# Patient Record
Sex: FEMALE | Race: WHITE | NOT HISPANIC OR LATINO | Employment: OTHER | URBAN - METROPOLITAN AREA
[De-identification: names, ages, dates, MRNs, and addresses within clinical notes are randomized per-mention and may not be internally consistent; named-entity substitution may affect disease eponyms.]

---

## 2019-09-06 RX ORDER — TEMAZEPAM 15 MG/1
15 CAPSULE ORAL NIGHTLY PRN
Qty: 30 CAPSULE | Refills: 0 | Status: SHIPPED | OUTPATIENT
Start: 2019-09-06 | End: 2019-10-06

## 2019-09-06 NOTE — TELEPHONE ENCOUNTER
----- Message from Peter Treviño sent at 9/6/2019 10:37 AM CDT -----  Contact: self  Type:  RX Refill Request    Who Called: Pt  Refill or New Rx:refill  RX Name and Strength:Temazapam 15 mg   How is the patient currently taking it? (ex. 1XDay): Once daily  Is this a 30 day or 90 day RX:90 day  Preferred Pharmacy with phone number: [facility] in [place] [phone]  Local or Mail Order:local  Ordering Provider: Dr Stephenson  Would the patient rather a call back or a response via [facility]? callback  Best Call Back Number: [phone]  Additional Information: Pt is a pt of Dr Stephenson. Pt needs refill on this medication

## 2020-01-09 ENCOUNTER — OFFICE VISIT (OUTPATIENT)
Dept: FAMILY MEDICINE | Facility: CLINIC | Age: 65
End: 2020-01-09
Payer: COMMERCIAL

## 2020-01-09 VITALS
SYSTOLIC BLOOD PRESSURE: 128 MMHG | OXYGEN SATURATION: 96 % | DIASTOLIC BLOOD PRESSURE: 88 MMHG | TEMPERATURE: 98 F | HEART RATE: 99 BPM | HEIGHT: 62 IN | BODY MASS INDEX: 21.83 KG/M2 | WEIGHT: 118.63 LBS

## 2020-01-09 DIAGNOSIS — F41.9 HYPOSOMNIA, INSOMNIA OR SLEEPLESSNESS ASSOCIATED WITH ANXIETY: ICD-10-CM

## 2020-01-09 DIAGNOSIS — F51.05 HYPOSOMNIA, INSOMNIA OR SLEEPLESSNESS ASSOCIATED WITH ANXIETY: ICD-10-CM

## 2020-01-09 DIAGNOSIS — Z87.19 HISTORY OF PANCREATITIS: ICD-10-CM

## 2020-01-09 DIAGNOSIS — E03.9 HYPOTHYROIDISM, UNSPECIFIED TYPE: ICD-10-CM

## 2020-01-09 DIAGNOSIS — Z12.11 SCREENING FOR COLON CANCER: ICD-10-CM

## 2020-01-09 DIAGNOSIS — Z00.00 WELLNESS EXAMINATION: ICD-10-CM

## 2020-01-09 DIAGNOSIS — K57.90 DIVERTICULAR DISEASE: ICD-10-CM

## 2020-01-09 DIAGNOSIS — I10 HYPERTENSION, UNSPECIFIED TYPE: ICD-10-CM

## 2020-01-09 LAB
ALBUMIN SERPL BCP-MCNC: 4.6 G/DL (ref 3.5–5.2)
ALP SERPL-CCNC: 89 U/L (ref 55–135)
ALT SERPL W/O P-5'-P-CCNC: 25 U/L (ref 10–44)
ANION GAP SERPL CALC-SCNC: 12 MMOL/L (ref 8–16)
AST SERPL-CCNC: 24 U/L (ref 10–40)
BASOPHILS # BLD AUTO: 0.04 K/UL (ref 0–0.2)
BASOPHILS NFR BLD: 0.4 % (ref 0–1.9)
BILIRUB SERPL-MCNC: 1.2 MG/DL (ref 0.1–1)
BUN SERPL-MCNC: 13 MG/DL (ref 8–23)
CALCIUM SERPL-MCNC: 10.7 MG/DL (ref 8.7–10.5)
CHLORIDE SERPL-SCNC: 103 MMOL/L (ref 95–110)
CHOLEST SERPL-MCNC: 266 MG/DL (ref 120–199)
CHOLEST/HDLC SERPL: 3.5 {RATIO} (ref 2–5)
CO2 SERPL-SCNC: 23 MMOL/L (ref 23–29)
CREAT SERPL-MCNC: 0.7 MG/DL (ref 0.5–1.4)
DIFFERENTIAL METHOD: ABNORMAL
EOSINOPHIL # BLD AUTO: 0.1 K/UL (ref 0–0.5)
EOSINOPHIL NFR BLD: 0.8 % (ref 0–8)
ERYTHROCYTE [DISTWIDTH] IN BLOOD BY AUTOMATED COUNT: 13.6 % (ref 11.5–14.5)
EST. GFR  (AFRICAN AMERICAN): >60 ML/MIN/1.73 M^2
EST. GFR  (NON AFRICAN AMERICAN): >60 ML/MIN/1.73 M^2
GLUCOSE SERPL-MCNC: 94 MG/DL (ref 70–110)
HCT VFR BLD AUTO: 44.7 % (ref 37–48.5)
HDLC SERPL-MCNC: 77 MG/DL (ref 40–75)
HDLC SERPL: 28.9 % (ref 20–50)
HGB BLD-MCNC: 14.5 G/DL (ref 12–16)
IMM GRANULOCYTES # BLD AUTO: 0.03 K/UL (ref 0–0.04)
IMM GRANULOCYTES NFR BLD AUTO: 0.3 % (ref 0–0.5)
LDLC SERPL CALC-MCNC: 172.6 MG/DL (ref 63–159)
LYMPHOCYTES # BLD AUTO: 0.8 K/UL (ref 1–4.8)
LYMPHOCYTES NFR BLD: 8.4 % (ref 18–48)
MCH RBC QN AUTO: 32.8 PG (ref 27–31)
MCHC RBC AUTO-ENTMCNC: 32.4 G/DL (ref 32–36)
MCV RBC AUTO: 101 FL (ref 82–98)
MONOCYTES # BLD AUTO: 0.5 K/UL (ref 0.3–1)
MONOCYTES NFR BLD: 5.7 % (ref 4–15)
NEUTROPHILS # BLD AUTO: 7.5 K/UL (ref 1.8–7.7)
NEUTROPHILS NFR BLD: 84.4 % (ref 38–73)
NONHDLC SERPL-MCNC: 189 MG/DL
NRBC BLD-RTO: 0 /100 WBC
PLATELET # BLD AUTO: 258 K/UL (ref 150–350)
PMV BLD AUTO: 11.3 FL (ref 9.2–12.9)
POTASSIUM SERPL-SCNC: 4.2 MMOL/L (ref 3.5–5.1)
PROT SERPL-MCNC: 8 G/DL (ref 6–8.4)
RBC # BLD AUTO: 4.42 M/UL (ref 4–5.4)
SODIUM SERPL-SCNC: 138 MMOL/L (ref 136–145)
T4 FREE SERPL-MCNC: 1.3 NG/DL (ref 0.71–1.51)
TRIGL SERPL-MCNC: 82 MG/DL (ref 30–150)
TSH SERPL DL<=0.005 MIU/L-ACNC: 0.07 UIU/ML (ref 0.4–4)
WBC # BLD AUTO: 8.89 K/UL (ref 3.9–12.7)

## 2020-01-09 PROCEDURE — 85025 COMPLETE CBC W/AUTO DIFF WBC: CPT

## 2020-01-09 PROCEDURE — 3074F PR MOST RECENT SYSTOLIC BLOOD PRESSURE < 130 MM HG: ICD-10-PCS | Mod: CPTII,S$GLB,, | Performed by: INTERNAL MEDICINE

## 2020-01-09 PROCEDURE — 99396 PREV VISIT EST AGE 40-64: CPT | Mod: S$GLB,,, | Performed by: INTERNAL MEDICINE

## 2020-01-09 PROCEDURE — 99396 PR PREVENTIVE VISIT,EST,40-64: ICD-10-PCS | Mod: S$GLB,,, | Performed by: INTERNAL MEDICINE

## 2020-01-09 PROCEDURE — 3079F PR MOST RECENT DIASTOLIC BLOOD PRESSURE 80-89 MM HG: ICD-10-PCS | Mod: CPTII,S$GLB,, | Performed by: INTERNAL MEDICINE

## 2020-01-09 PROCEDURE — 3074F SYST BP LT 130 MM HG: CPT | Mod: CPTII,S$GLB,, | Performed by: INTERNAL MEDICINE

## 2020-01-09 PROCEDURE — 84443 ASSAY THYROID STIM HORMONE: CPT

## 2020-01-09 PROCEDURE — 80053 COMPREHEN METABOLIC PANEL: CPT

## 2020-01-09 PROCEDURE — 3079F DIAST BP 80-89 MM HG: CPT | Mod: CPTII,S$GLB,, | Performed by: INTERNAL MEDICINE

## 2020-01-09 PROCEDURE — 99999 PR PBB SHADOW E&M-EST. PATIENT-LVL III: ICD-10-PCS | Mod: PBBFAC,,, | Performed by: INTERNAL MEDICINE

## 2020-01-09 PROCEDURE — 36415 COLL VENOUS BLD VENIPUNCTURE: CPT

## 2020-01-09 PROCEDURE — 99999 PR PBB SHADOW E&M-EST. PATIENT-LVL III: CPT | Mod: PBBFAC,,, | Performed by: INTERNAL MEDICINE

## 2020-01-09 PROCEDURE — 3008F BODY MASS INDEX DOCD: CPT | Mod: CPTII,S$GLB,, | Performed by: INTERNAL MEDICINE

## 2020-01-09 PROCEDURE — 80061 LIPID PANEL: CPT

## 2020-01-09 PROCEDURE — 84439 ASSAY OF FREE THYROXINE: CPT

## 2020-01-09 PROCEDURE — 3008F PR BODY MASS INDEX (BMI) DOCUMENTED: ICD-10-PCS | Mod: CPTII,S$GLB,, | Performed by: INTERNAL MEDICINE

## 2020-01-09 RX ORDER — TEMAZEPAM 15 MG/1
15-30 CAPSULE ORAL NIGHTLY PRN
Qty: 60 CAPSULE | Refills: 3 | Status: SHIPPED | OUTPATIENT
Start: 2020-01-09 | End: 2020-06-01 | Stop reason: SDUPTHER

## 2020-01-09 RX ORDER — ACYCLOVIR 400 MG/1
400 TABLET ORAL DAILY PRN
COMMUNITY
Start: 2019-10-08

## 2020-01-09 RX ORDER — LEVOTHYROXINE SODIUM 88 UG/1
88 TABLET ORAL DAILY
Qty: 30 TABLET | Refills: 6 | Status: SHIPPED | OUTPATIENT
Start: 2020-01-09 | End: 2020-01-13

## 2020-01-09 RX ORDER — TEMAZEPAM 15 MG/1
15-30 CAPSULE ORAL NIGHTLY PRN
COMMUNITY
End: 2020-01-09 | Stop reason: SDUPTHER

## 2020-01-09 RX ORDER — PROMETHAZINE HYDROCHLORIDE 25 MG/1
1 TABLET ORAL EVERY 6 HOURS PRN
COMMUNITY
Start: 2019-10-31 | End: 2020-01-09 | Stop reason: SDUPTHER

## 2020-01-09 RX ORDER — FLUOCINOLONE ACETONIDE 0.11 MG/ML
1 OIL TOPICAL NIGHTLY
COMMUNITY
Start: 2019-12-31 | End: 2022-03-10

## 2020-01-09 RX ORDER — LEVOTHYROXINE SODIUM 88 UG/1
88 TABLET ORAL DAILY
COMMUNITY
Start: 2019-12-13 | End: 2020-01-09 | Stop reason: SDUPTHER

## 2020-01-09 RX ORDER — LISINOPRIL AND HYDROCHLOROTHIAZIDE 10; 12.5 MG/1; MG/1
1 TABLET ORAL DAILY
COMMUNITY
End: 2020-01-09 | Stop reason: ALTCHOICE

## 2020-01-09 RX ORDER — PROMETHAZINE HYDROCHLORIDE 25 MG/1
25 TABLET ORAL EVERY 6 HOURS PRN
Qty: 30 TABLET | Refills: 3 | Status: SHIPPED | OUTPATIENT
Start: 2020-01-09 | End: 2021-02-23

## 2020-01-09 NOTE — PROGRESS NOTES
[facility] Note    Chief Complaint      Chief Complaint   Patient presents with    Annual Exam     pt needs to re-est from      History of Present Illness      Essence Tapia is a 64 y.o. female who presents today for annual wellness exam.  Patient  Is previously est with me at [facility]      Active Problem List with Overview Notes    Diagnosis Date Noted    Diverticular disease 2020     Sees jabier, had complicated admission in 2018 for diverticulitis with pseudomonal bacteremia     Has some continued episodes of n/v but she attributes to lactose intolerance       History of pancreatitis 2020     Was taking prn tramadol which caused itching so she has self dc'd   Has cut down on drinking       Hypertension 2020     Was started on lisinoprol hctz but bp is normalized and has had 10# intentional weigth loss, also cut back drinking, taking meds less tahn once weekly,       Hyposomnia, insomnia or sleeplessness associated with anxiety 2020     Using prn temazepam, sometimes 15 and sometimes 30, taking drug holidays       Wellness examination 2020    Hypothyroid 2020     Feels euthyroid, endocrine recenly lincreased synthroid 75 to 88       Screening for colon cancer 2020     Last scope dr osorio kay              Health Maintenance   Topic Date Due    Hepatitis C Screening  1955    Lipid Panel  1955    TETANUS VACCINE  07/10/1975    Pap Smear with HPV Cotest  07/10/1978    Mammogram  07/10/1997    Colonoscopy  12/15/2020       Pap: unsure   MM dr zhou nl   DEXA:dr zhou   Colonoscopy: due for cscope this yr dr high   Td: unsure  Flu: refuses   Shingles: interested, will send to pharmacy downstairs   Tobacco:   Other MDs: [facility]   Fam Hx breast, colon, lung:     Past Medical History:   Diagnosis Date    Diverticulitis     Edema     Spinal stenosis        Past Surgical History:   Procedure  Laterality Date    APPENDECTOMY       SECTION, CLASSIC      NASAL RECONSTRUCTION      SIGMOIDECTOMY         family history includes Breast cancer in her sister; No Known Problems in her father and mother.    Social History     Tobacco Use    Smoking status: Never Smoker    Smokeless tobacco: Never Used   Substance Use Topics    Alcohol use: Yes    Drug use: No       Review of Systems   Constitutional: Negative for chills and fever.   HENT: Negative for congestion and sore throat.    Eyes: Negative for blurred vision and discharge.   Respiratory: Negative for cough and shortness of breath.    Cardiovascular: Negative for chest pain and palpitations.   Gastrointestinal: Negative for constipation, diarrhea, nausea and vomiting.   Genitourinary: Negative for dysuria and hematuria.   Musculoskeletal: Negative for falls and myalgias.   Skin: Negative for itching and rash.   Neurological: Negative for dizziness and headaches.        Outpatient Encounter Medications as of 2020   Medication Sig Dispense Refill    acyclovir (ZOVIRAX) 400 MG tablet Take 400 mg by mouth once daily.      DERMA-SMOOTHE/FS SCALP OIL 0.01 % Oil Apply 1 application topically every evening.      levothyroxine (SYNTHROID) 88 MCG tablet Take 1 tablet (88 mcg total) by mouth once daily. 30 tablet 6    promethazine (PHENERGAN) 25 MG tablet Take 1 tablet (25 mg total) by mouth every 6 (six) hours as needed. 30 tablet 3    temazepam (RESTORIL) 15 mg Cap Take 1-2 capsules (15-30 mg total) by mouth nightly as needed. 60 capsule 3    triamterene-hydrochlorothiazide 37.5-25 mg (DYAZIDE) 37.5-25 mg per capsule Take 1 capsule by mouth every morning.      [DISCONTINUED] levothyroxine (SYNTHROID) 88 MCG tablet Take 88 mcg by mouth once daily.      [DISCONTINUED] lisinopril-hydrochlorothiazide (PRINZIDE,ZESTORETIC) 10-12.5 mg per tablet Take 1 tablet by mouth once daily.      [DISCONTINUED] promethazine (PHENERGAN) 25 MG tablet Take 1  "tablet by mouth every 6 (six) hours as needed.      [DISCONTINUED] temazepam (RESTORIL) 15 mg Cap Take 15-30 mg by mouth nightly as needed.      [DISCONTINUED] norethindrone-ethinyl estradiol-iron (MICROGESTIN FE1.5/30) 1.5 mg-30 mcg (21)/75 mg (7) tablet Take 1 tablet by mouth once daily.      [DISCONTINUED] ondansetron (ZOFRAN) 4 MG tablet Take 1 tablet (4 mg total) by mouth every 8 (eight) hours as needed for Nausea. 12 tablet 0     No facility-administered encounter medications on file as of 1/9/2020.         Review of patient's allergies indicates:   Allergen Reactions    Codeine     Dilaudid [hydromorphone] Other (See Comments)     hallucinations    Hydrocodone Hives, Nausea And Vomiting and Other (See Comments)     hallucinations    Latex Itching    Tramadol     Zofran [ondansetron hcl (pf)]            Physical Exam      Vital Signs  Temp: 98.2 °F (36.8 °C)  Temp src: Oral  Pulse: 99  SpO2: 96 %  BP: 128/88  BP Location: Right arm  Patient Position: Sitting  Pain Score: 0-No pain  Height and Weight  Height: 5' 2" (157.5 cm)  Weight: 53.8 kg (118 lb 9.7 oz)  BSA (Calculated - sq m): 1.53 sq meters  BMI (Calculated): 21.7  Weight in (lb) to have BMI = 25: 136.4]    Physical Exam   Constitutional: She is oriented to person, place, and time. She appears well-developed and well-nourished.   HENT:   Head: Normocephalic and atraumatic.   Right Ear: External ear normal.   Left Ear: External ear normal.   Eyes: Right eye exhibits no discharge. Left eye exhibits no discharge.   Neck: Normal range of motion. No thyromegaly present.   Cardiovascular: Normal rate, regular rhythm, normal heart sounds and intact distal pulses.   No murmur heard.  Pulmonary/Chest: Effort normal and breath sounds normal. No respiratory distress.   Abdominal: Soft. Bowel sounds are normal. She exhibits no distension. There is no tenderness.   Musculoskeletal: Normal range of motion. She exhibits no deformity.   Neurological: She is " alert and oriented to person, place, and time.   Skin: Skin is warm and dry. No rash noted.   Psychiatric: She has a normal mood and affect. Her behavior is normal.        Laboratory:  CBC:  No results for input(s): WBC, RBC, HGB, HCT, PLT, MCV, MCH, MCHC in the last 2160 hours.  CMP:  No results for input(s): GLU, CALCIUM, ALBUMIN, PROT, NA, K, CO2, CL, BUN, ALKPHOS, ALT, AST, BILITOT in the last 2160 hours.    Invalid input(s): CREATININ  URINALYSIS:  No results for input(s): COLORU, CLARITYU, SPECGRAV, PHUR, PROTEINUA, GLUCOSEU, BILIRUBINCON, BLOODU, WBCU, RBCU, BACTERIA, MUCUS, NITRITE, LEUKOCYTESUR, UROBILINOGEN, HYALINECASTS in the last 2160 hours.   LIPIDS:  No results for input(s): TSH, HDL, CHOL, TRIG, LDLCALC, CHOLHDL, NONHDLCHOL, TOTALCHOLEST in the last 2160 hours.  TSH:  No results for input(s): TSH in the last 2160 hours.  A1C:  No results for input(s): HGBA1C in the last 2160 hours.        Assessment/Plan     Essence Tapia is a 64 y.o.female with:    Hypertension  Stop bp meds and will call with log     Hypothyroid  Ck tfts     Screening for colon cancer  Due for repeat ref placed     Hyposomnia, insomnia or sleeplessness associated with anxiety  Refill ordered     Diverticular disease  Will let mne know if having symptms     History of pancreatitis  Continued avodiance of provoking measures       Orders Placed This Encounter   Procedures    CBC auto differential    Lipid panel    Comprehensive metabolic panel    TSH    T4, free    Ambulatory referral to Colorectal Surgery     Referral Priority:   Routine     Referral Type:   Consultation     Referral Reason:   Specialty Services Required     Referred to Provider:   Calvin Simeon MD     Requested Specialty:   Colon and Rectal Surgery     Number of Visits Requested:   1       -Continue current medications and maintain follow up with specialists.  Return to clinic in6 months.    -Will obtain [facility] records     Noreen Stephenson  MD  1/9/2020 11:04 AM    [facility]

## 2020-01-13 ENCOUNTER — TELEPHONE (OUTPATIENT)
Dept: FAMILY MEDICINE | Facility: CLINIC | Age: 65
End: 2020-01-13

## 2020-01-13 DIAGNOSIS — E03.9 HYPOTHYROIDISM, UNSPECIFIED TYPE: ICD-10-CM

## 2020-01-13 DIAGNOSIS — E83.52 HYPERCALCEMIA: Primary | ICD-10-CM

## 2020-01-13 RX ORDER — LEVOTHYROXINE SODIUM 88 UG/1
75 TABLET ORAL DAILY
Qty: 30 TABLET | Refills: 6 | Status: SHIPPED | OUTPATIENT
Start: 2020-01-13 | End: 2020-01-13

## 2020-01-13 RX ORDER — LEVOTHYROXINE SODIUM 75 UG/1
75 TABLET ORAL DAILY
Qty: 90 TABLET | Refills: 3 | Status: SHIPPED | OUTPATIENT
Start: 2020-01-13 | End: 2020-12-18

## 2020-01-13 NOTE — TELEPHONE ENCOUNTER
Called pt to discuss labs  subclin hhyperthyroid, will reduce to 75 mcg and patricio tfts 8 weeks    CMP looks hemoconcnetrated will patricio with TFts, pt was fasting all am at time of labs     Discuss flp at follow up

## 2020-01-13 NOTE — TELEPHONE ENCOUNTER
----- Message from Eva Vaughan sent at 1/13/2020  3:07 PM CST -----  Contact: Self [phone]  Patient Returning Your Phone Call

## 2020-04-06 RX ORDER — LISINOPRIL AND HYDROCHLOROTHIAZIDE 10; 12.5 MG/1; MG/1
1 TABLET ORAL DAILY
Qty: 90 TABLET | Refills: 3 | Status: SHIPPED | OUTPATIENT
Start: 2020-04-06 | End: 2021-02-23 | Stop reason: SDUPTHER

## 2020-04-06 NOTE — TELEPHONE ENCOUNTER
----- Message from Kendal Noble sent at 4/6/2020  8:25 AM CDT -----  Contact: self call [phone]  Requesting an RX refill or new RX.  Is this a refill or new RX:  Refill  RX name and strength: Lisinopril - HCTZ 10/12.5 mg tab  Directions (copy/paste from chart): Take one tab by mouth daily   Is this a 30 day or 90 day RX:  90  Local pharmacy or mail order pharmacy:  Local   Pharmacy name and phone # (copy/paste from chart):  [facility]  [phone]   Comments:

## 2020-04-06 NOTE — TELEPHONE ENCOUNTER
I call patient to ask which medications she is on patient said she taking Lisinopril/HCTZ she no longer on Dyazide.

## 2020-04-13 PROBLEM — Z00.00 WELLNESS EXAMINATION: Status: RESOLVED | Noted: 2020-01-09 | Resolved: 2020-04-13

## 2020-05-04 ENCOUNTER — TELEPHONE (OUTPATIENT)
Dept: FAMILY MEDICINE | Facility: CLINIC | Age: 65
End: 2020-05-04

## 2020-05-04 ENCOUNTER — OFFICE VISIT (OUTPATIENT)
Dept: FAMILY MEDICINE | Facility: CLINIC | Age: 65
End: 2020-05-04
Payer: COMMERCIAL

## 2020-05-04 VITALS
WEIGHT: 121.5 LBS | TEMPERATURE: 99 F | BODY MASS INDEX: 22.36 KG/M2 | HEIGHT: 62 IN | SYSTOLIC BLOOD PRESSURE: 110 MMHG | HEART RATE: 100 BPM | RESPIRATION RATE: 16 BRPM | DIASTOLIC BLOOD PRESSURE: 78 MMHG | OXYGEN SATURATION: 95 %

## 2020-05-04 DIAGNOSIS — R11.0 NAUSEA: ICD-10-CM

## 2020-05-04 DIAGNOSIS — R10.9 ABDOMINAL PAIN, UNSPECIFIED ABDOMINAL LOCATION: ICD-10-CM

## 2020-05-04 DIAGNOSIS — R07.1 CHEST PAIN ON BREATHING: Primary | ICD-10-CM

## 2020-05-04 PROCEDURE — 99999 PR PBB SHADOW E&M-EST. PATIENT-LVL III: CPT | Mod: PBBFAC,,, | Performed by: INTERNAL MEDICINE

## 2020-05-04 PROCEDURE — 3078F PR MOST RECENT DIASTOLIC BLOOD PRESSURE < 80 MM HG: ICD-10-PCS | Mod: CPTII,S$GLB,, | Performed by: INTERNAL MEDICINE

## 2020-05-04 PROCEDURE — 3078F DIAST BP <80 MM HG: CPT | Mod: CPTII,S$GLB,, | Performed by: INTERNAL MEDICINE

## 2020-05-04 PROCEDURE — 93005 ELECTROCARDIOGRAM TRACING: CPT | Mod: S$GLB,,, | Performed by: INTERNAL MEDICINE

## 2020-05-04 PROCEDURE — 93005 EKG 12-LEAD: ICD-10-PCS | Mod: S$GLB,,, | Performed by: INTERNAL MEDICINE

## 2020-05-04 PROCEDURE — 99214 OFFICE O/P EST MOD 30 MIN: CPT | Mod: S$GLB,,, | Performed by: INTERNAL MEDICINE

## 2020-05-04 PROCEDURE — 3074F SYST BP LT 130 MM HG: CPT | Mod: CPTII,S$GLB,, | Performed by: INTERNAL MEDICINE

## 2020-05-04 PROCEDURE — 99999 PR PBB SHADOW E&M-EST. PATIENT-LVL III: ICD-10-PCS | Mod: PBBFAC,,, | Performed by: INTERNAL MEDICINE

## 2020-05-04 PROCEDURE — 99214 PR OFFICE/OUTPT VISIT, EST, LEVL IV, 30-39 MIN: ICD-10-PCS | Mod: S$GLB,,, | Performed by: INTERNAL MEDICINE

## 2020-05-04 PROCEDURE — 3074F PR MOST RECENT SYSTOLIC BLOOD PRESSURE < 130 MM HG: ICD-10-PCS | Mod: CPTII,S$GLB,, | Performed by: INTERNAL MEDICINE

## 2020-05-04 PROCEDURE — 93010 EKG 12-LEAD: ICD-10-PCS | Mod: S$GLB,,, | Performed by: INTERNAL MEDICINE

## 2020-05-04 PROCEDURE — 3008F PR BODY MASS INDEX (BMI) DOCUMENTED: ICD-10-PCS | Mod: CPTII,S$GLB,, | Performed by: INTERNAL MEDICINE

## 2020-05-04 PROCEDURE — 93010 ELECTROCARDIOGRAM REPORT: CPT | Mod: S$GLB,,, | Performed by: INTERNAL MEDICINE

## 2020-05-04 PROCEDURE — 3008F BODY MASS INDEX DOCD: CPT | Mod: CPTII,S$GLB,, | Performed by: INTERNAL MEDICINE

## 2020-05-04 NOTE — TELEPHONE ENCOUNTER
----- Message from Batool Albright sent at 5/4/2020  8:34 AM CDT -----  Contact: [phone]/ self   Patient requesting to speak with you regarding chest pain. Please advise.

## 2020-05-04 NOTE — TELEPHONE ENCOUNTER
Talked with patient regarding complaint   Sudden onset Sharp pain in back between shoulders about 24 hours ago, is constant, radiating down r arm,feels sob/pain worse on deep inspiration, NSAIDs and heat not helping.  Endorses some sadness and anxiety     Bp is normal       Will put her on for 230

## 2020-05-04 NOTE — ASSESSMENT & PLAN NOTE
In office EKG reassurinf but CP with nausea in a female, pt looks clinically poor, history of complicated GI illness  Needs IVF, real time labs and imaging, troponin   To ED   Pt verbalized understanding to [facility] ed per pt preference

## 2020-05-05 NOTE — PROGRESS NOTES
[facility] Note    Chief Complaint      Chief Complaint   Patient presents with    Chest Pain     History of Present Illness      Essencetesha Tapia is a 64 y.o. female who presents today for chief complaint chest pain x1day .  PCP: Noreen Stephenson MD      Active Problem List with Overview Notes    Diagnosis Date Noted    Chest pain on breathing 05/04/2020     x24 hours   Sudden onset, constant, sharp, worse on deep inspiration, initially in back between shoulder blades  And radiating down r arm, she thought to be msk so tried nsaids and heat but no relief   Now also substernal, non reproducible, associated with mild nause and abdominal bloating          HPI     Health Maintenance   Topic Date Due    Hepatitis C Screening  1955    TETANUS VACCINE  07/10/1975    Pap Smear with HPV Cotest  07/10/1978    Mammogram  07/10/1997    Colonoscopy  12/15/2020    Lipid Panel  2025       Past Medical History:   Diagnosis Date    Diverticulitis     Edema     Spinal stenosis        Past Surgical History:   Procedure Laterality Date    APPENDECTOMY       SECTION, CLASSIC      NASAL RECONSTRUCTION      SIGMOIDECTOMY         family history includes Breast cancer in her sister; No Known Problems in her father and mother.    Social History     Tobacco Use    Smoking status: Never Smoker    Smokeless tobacco: Never Used   Substance Use Topics    Alcohol use: Yes    Drug use: No       Review of Systems   Constitutional: Positive for malaise/fatigue. Negative for chills, diaphoresis and fever.   Respiratory: Negative for cough, hemoptysis, sputum production and wheezing.    Cardiovascular: Positive for chest pain and palpitations. Negative for leg swelling and PND.   Gastrointestinal: Positive for diarrhea and nausea. Negative for abdominal pain, heartburn and vomiting.        Outpatient Encounter Medications as of 2020   Medication Sig Dispense Refill     "levothyroxine (SYNTHROID) 75 MCG tablet Take 1 tablet (75 mcg total) by mouth once daily. 90 tablet 3    lisinopriL-hydrochlorothiazide (PRINZIDE,ZESTORETIC) 10-12.5 mg per tablet Take 1 tablet by mouth once daily. 90 tablet 3    temazepam (RESTORIL) 15 mg Cap Take 1-2 capsules (15-30 mg total) by mouth nightly as needed. 60 capsule 3    acyclovir (ZOVIRAX) 400 MG tablet Take 400 mg by mouth once daily.      DERMA-SMOOTHE/FS SCALP OIL 0.01 % Oil Apply 1 application topically every evening.      promethazine (PHENERGAN) 25 MG tablet Take 1 tablet (25 mg total) by mouth every 6 (six) hours as needed. (Patient not taking: Reported on 5/4/2020) 30 tablet 3     No facility-administered encounter medications on file as of 5/4/2020.         Review of patient's allergies indicates:   Allergen Reactions    Codeine     Dilaudid [hydromorphone] Other (See Comments)     hallucinations    Hydrocodone Hives, Nausea And Vomiting and Other (See Comments)     hallucinations    Latex Itching    Tramadol     Zofran [ondansetron hcl (pf)]            Physical Exam      Vital Signs  Temp: 98.7 °F (37.1 °C)  Temp src: Oral  Pulse: 100  Resp: 16  SpO2: 95 %  BP: 110/78  BP Location: Right arm  Patient Position: Sitting  Height and Weight  Height: 5' 2" (157.5 cm)  Weight: 55.1 kg (121 lb 7.6 oz)  BSA (Calculated - sq m): 1.55 sq meters  BMI (Calculated): 22.2  Weight in (lb) to have BMI = 25: 136.4]    Physical Exam   Constitutional: She is oriented to person, place, and time. She appears well-developed and well-nourished. She appears ill.   HENT:   Head: Normocephalic and atraumatic.   Right Ear: External ear normal.   Left Ear: External ear normal.   Eyes: Right eye exhibits no discharge. Left eye exhibits no discharge.   Neck: Normal range of motion. JVD: mild tpp epigastricv. No thyromegaly present.   Cardiovascular: Normal rate, regular rhythm, normal heart sounds and intact distal pulses.   No murmur " heard.  Pulmonary/Chest: Effort normal and breath sounds normal. No respiratory distress.   Abdominal: Soft. Bowel sounds are normal. She exhibits no distension. There is tenderness.   Musculoskeletal: Normal range of motion. She exhibits no deformity.   Neurological: She is alert and oriented to person, place, and time.   Skin: Skin is warm and dry. No rash noted.   Psychiatric: She has a normal mood and affect. Her behavior is normal.   Vitals reviewed.       Laboratory:  CBC:  No results for input(s): WBC, RBC, HGB, HCT, PLT, MCV, MCH, MCHC in the last 2160 hours.  CMP:  No results for input(s): GLU, CALCIUM, ALBUMIN, PROT, NA, K, CO2, CL, BUN, ALKPHOS, ALT, AST, BILITOT in the last 2160 hours.    Invalid input(s): CREATININ  URINALYSIS:  No results for input(s): COLORU, CLARITYU, SPECGRAV, PHUR, PROTEINUA, GLUCOSEU, BILIRUBINCON, BLOODU, WBCU, RBCU, BACTERIA, MUCUS, NITRITE, LEUKOCYTESUR, UROBILINOGEN, HYALINECASTS in the last 2160 hours.   LIPIDS:  No results for input(s): TSH, HDL, CHOL, TRIG, LDLCALC, CHOLHDL, NONHDLCHOL, TOTALCHOLEST in the last 2160 hours.  TSH:  No results for input(s): TSH in the last 2160 hours.  A1C:  No results for input(s): HGBA1C in the last 2160 hours.        Assessment/Plan     Essence Tapia is a 64 y.o.female with:    Chest pain on breathing  In office EKG reassurinf but CP with nausea in a female, pt looks clinically poor, history of complicated GI illness  Needs IVF, real time labs and imaging, troponin   To ED   Pt verbalized understanding to [facility] ed per pt preference         Orders Placed This Encounter   Procedures    CBC auto differential     Standing Status:   Future     Standing Expiration Date:   7/3/2021    Comprehensive metabolic panel     Standing Status:   Future     Standing Expiration Date:   7/3/2021    Amylase     Standing Status:   Future     Standing Expiration Date:   7/3/2021    Lipase     Standing Status:   Future     Standing Expiration Date:    7/3/2021    IN OFFICE EKG 12-LEAD (to Muse)     Order Specific Question:   Diagnosis     Answer:   Chest pain [832773]       -Continue current medications and maintain follow up with specialists.  No future appointments.    Noreen Stephenson MD  5/5/2020 9:07 AM    [facility] - [facility]

## 2020-05-08 DIAGNOSIS — Z12.39 BREAST CANCER SCREENING: ICD-10-CM

## 2020-05-12 ENCOUNTER — PATIENT OUTREACH (OUTPATIENT)
Dept: ADMINISTRATIVE | Facility: HOSPITAL | Age: 65
End: 2020-05-12

## 2020-06-01 DIAGNOSIS — F51.05 HYPOSOMNIA, INSOMNIA OR SLEEPLESSNESS ASSOCIATED WITH ANXIETY: ICD-10-CM

## 2020-06-01 DIAGNOSIS — F41.9 HYPOSOMNIA, INSOMNIA OR SLEEPLESSNESS ASSOCIATED WITH ANXIETY: ICD-10-CM

## 2020-06-01 RX ORDER — TEMAZEPAM 15 MG/1
15-30 CAPSULE ORAL NIGHTLY PRN
Qty: 60 CAPSULE | Refills: 3 | Status: SHIPPED | OUTPATIENT
Start: 2020-06-01 | End: 2020-10-01

## 2020-06-01 NOTE — TELEPHONE ENCOUNTER
----- Message from Netta Dale sent at 6/1/2020  9:36 AM CDT -----  Contact: self/ [phone]  Requesting an RX refill or new RX.  Is this a refill or new RX:    RX name and strength: temazepam (RESTORIL) 15 mg Cap 60 capsule   Directions (copy/paste from chart):  Take 1-2 capsules (15-30 mg total) by mouth nightly as needed  Is this a 30 day or 90 day RX:  90  Local pharmacy or mail order pharmacy:  [facility]/pharmacy #[id number] - [place] - [address] [phone] (Phone)  [phone] (Fax)  Pharmacy name and phone # (copy/paste from chart):     Comments:

## 2020-07-27 NOTE — TELEPHONE ENCOUNTER
Patient states around 5am this morning she started having pain started in shoulder and now in her chest. She states it is hard to breath. Patient called the [facility] nurse and talked to her. Patient does not want to go to the ER because of  Covid-19.    No

## 2020-10-05 ENCOUNTER — PATIENT MESSAGE (OUTPATIENT)
Dept: INTERNAL MEDICINE | Facility: CLINIC | Age: 65
End: 2020-10-05

## 2021-01-04 ENCOUNTER — PATIENT MESSAGE (OUTPATIENT)
Dept: ADMINISTRATIVE | Facility: HOSPITAL | Age: 66
End: 2021-01-04

## 2021-02-08 ENCOUNTER — TELEPHONE (OUTPATIENT)
Dept: FAMILY MEDICINE | Facility: CLINIC | Age: 66
End: 2021-02-08

## 2021-02-09 ENCOUNTER — PATIENT OUTREACH (OUTPATIENT)
Dept: ADMINISTRATIVE | Facility: HOSPITAL | Age: 66
End: 2021-02-09

## 2021-02-23 ENCOUNTER — TELEPHONE (OUTPATIENT)
Dept: ADMINISTRATIVE | Facility: HOSPITAL | Age: 66
End: 2021-02-23

## 2021-02-23 ENCOUNTER — OFFICE VISIT (OUTPATIENT)
Dept: FAMILY MEDICINE | Facility: CLINIC | Age: 66
End: 2021-02-23
Payer: COMMERCIAL

## 2021-02-23 VITALS
SYSTOLIC BLOOD PRESSURE: 116 MMHG | BODY MASS INDEX: 21.97 KG/M2 | DIASTOLIC BLOOD PRESSURE: 82 MMHG | HEART RATE: 74 BPM | TEMPERATURE: 98 F | HEIGHT: 62 IN | WEIGHT: 119.38 LBS | OXYGEN SATURATION: 99 %

## 2021-02-23 DIAGNOSIS — Z11.4 ENCOUNTER FOR SCREENING FOR HIV: ICD-10-CM

## 2021-02-23 DIAGNOSIS — F41.9 HYPOSOMNIA, INSOMNIA OR SLEEPLESSNESS ASSOCIATED WITH ANXIETY: ICD-10-CM

## 2021-02-23 DIAGNOSIS — I10 HYPERTENSION, UNSPECIFIED TYPE: ICD-10-CM

## 2021-02-23 DIAGNOSIS — Z12.31 ENCOUNTER FOR SCREENING MAMMOGRAM FOR MALIGNANT NEOPLASM OF BREAST: ICD-10-CM

## 2021-02-23 DIAGNOSIS — E03.9 HYPOTHYROIDISM, UNSPECIFIED TYPE: ICD-10-CM

## 2021-02-23 DIAGNOSIS — Z00.00 WELLNESS EXAMINATION: Primary | ICD-10-CM

## 2021-02-23 DIAGNOSIS — K57.90 DIVERTICULAR DISEASE: ICD-10-CM

## 2021-02-23 DIAGNOSIS — F51.05 HYPOSOMNIA, INSOMNIA OR SLEEPLESSNESS ASSOCIATED WITH ANXIETY: ICD-10-CM

## 2021-02-23 DIAGNOSIS — Z11.59 ENCOUNTER FOR HEPATITIS C SCREENING TEST FOR LOW RISK PATIENT: ICD-10-CM

## 2021-02-23 PROCEDURE — 3008F BODY MASS INDEX DOCD: CPT | Mod: CPTII,S$GLB,, | Performed by: INTERNAL MEDICINE

## 2021-02-23 PROCEDURE — 3079F PR MOST RECENT DIASTOLIC BLOOD PRESSURE 80-89 MM HG: ICD-10-PCS | Mod: CPTII,S$GLB,, | Performed by: INTERNAL MEDICINE

## 2021-02-23 PROCEDURE — 99396 PREV VISIT EST AGE 40-64: CPT | Mod: S$GLB,,, | Performed by: INTERNAL MEDICINE

## 2021-02-23 PROCEDURE — 3074F SYST BP LT 130 MM HG: CPT | Mod: CPTII,S$GLB,, | Performed by: INTERNAL MEDICINE

## 2021-02-23 PROCEDURE — 1126F AMNT PAIN NOTED NONE PRSNT: CPT | Mod: S$GLB,,, | Performed by: INTERNAL MEDICINE

## 2021-02-23 PROCEDURE — 99999 PR PBB SHADOW E&M-EST. PATIENT-LVL IV: ICD-10-PCS | Mod: PBBFAC,,, | Performed by: INTERNAL MEDICINE

## 2021-02-23 PROCEDURE — 3079F DIAST BP 80-89 MM HG: CPT | Mod: CPTII,S$GLB,, | Performed by: INTERNAL MEDICINE

## 2021-02-23 PROCEDURE — 99396 PR PREVENTIVE VISIT,EST,40-64: ICD-10-PCS | Mod: S$GLB,,, | Performed by: INTERNAL MEDICINE

## 2021-02-23 PROCEDURE — 3074F PR MOST RECENT SYSTOLIC BLOOD PRESSURE < 130 MM HG: ICD-10-PCS | Mod: CPTII,S$GLB,, | Performed by: INTERNAL MEDICINE

## 2021-02-23 PROCEDURE — 3008F PR BODY MASS INDEX (BMI) DOCUMENTED: ICD-10-PCS | Mod: CPTII,S$GLB,, | Performed by: INTERNAL MEDICINE

## 2021-02-23 PROCEDURE — 1126F PR PAIN SEVERITY QUANTIFIED, NO PAIN PRESENT: ICD-10-PCS | Mod: S$GLB,,, | Performed by: INTERNAL MEDICINE

## 2021-02-23 PROCEDURE — 99999 PR PBB SHADOW E&M-EST. PATIENT-LVL IV: CPT | Mod: PBBFAC,,, | Performed by: INTERNAL MEDICINE

## 2021-02-23 RX ORDER — LEVOTHYROXINE SODIUM 75 UG/1
75 TABLET ORAL DAILY
Qty: 90 TABLET | Refills: 3 | Status: SHIPPED | OUTPATIENT
Start: 2021-02-23 | End: 2022-01-11

## 2021-02-23 RX ORDER — LISINOPRIL AND HYDROCHLOROTHIAZIDE 10; 12.5 MG/1; MG/1
1 TABLET ORAL DAILY
Qty: 90 TABLET | Refills: 3 | Status: SHIPPED | OUTPATIENT
Start: 2021-02-23 | End: 2022-03-17

## 2021-02-23 RX ORDER — TEMAZEPAM 15 MG/1
15 CAPSULE ORAL NIGHTLY
Qty: 90 CAPSULE | Refills: 3 | Status: SHIPPED | OUTPATIENT
Start: 2021-02-23 | End: 2021-03-11

## 2021-02-25 ENCOUNTER — PATIENT MESSAGE (OUTPATIENT)
Dept: ADMINISTRATIVE | Facility: HOSPITAL | Age: 66
End: 2021-02-25

## 2021-02-25 ENCOUNTER — PATIENT OUTREACH (OUTPATIENT)
Dept: ADMINISTRATIVE | Facility: HOSPITAL | Age: 66
End: 2021-02-25

## 2021-03-04 ENCOUNTER — PATIENT MESSAGE (OUTPATIENT)
Dept: FAMILY MEDICINE | Facility: CLINIC | Age: 66
End: 2021-03-04

## 2021-03-04 ENCOUNTER — TELEPHONE (OUTPATIENT)
Dept: FAMILY MEDICINE | Facility: CLINIC | Age: 66
End: 2021-03-04

## 2021-03-08 PROBLEM — R07.1 CHEST PAIN ON BREATHING: Status: RESOLVED | Noted: 2020-05-04 | Resolved: 2021-03-08

## 2021-03-10 ENCOUNTER — TELEPHONE (OUTPATIENT)
Dept: FAMILY MEDICINE | Facility: CLINIC | Age: 66
End: 2021-03-10

## 2021-03-10 DIAGNOSIS — E03.9 HYPOTHYROIDISM, UNSPECIFIED TYPE: Primary | ICD-10-CM

## 2021-03-10 DIAGNOSIS — E78.5 DYSLIPIDEMIA: ICD-10-CM

## 2021-03-10 DIAGNOSIS — F41.9 HYPOSOMNIA, INSOMNIA OR SLEEPLESSNESS ASSOCIATED WITH ANXIETY: ICD-10-CM

## 2021-03-10 DIAGNOSIS — F51.05 HYPOSOMNIA, INSOMNIA OR SLEEPLESSNESS ASSOCIATED WITH ANXIETY: ICD-10-CM

## 2021-03-11 RX ORDER — TEMAZEPAM 15 MG/1
CAPSULE ORAL
Qty: 60 CAPSULE | Refills: 0 | Status: SHIPPED | OUTPATIENT
Start: 2021-03-11 | End: 2021-04-13

## 2021-04-05 ENCOUNTER — PATIENT MESSAGE (OUTPATIENT)
Dept: ADMINISTRATIVE | Facility: HOSPITAL | Age: 66
End: 2021-04-05

## 2021-04-13 DIAGNOSIS — F51.05 HYPOSOMNIA, INSOMNIA OR SLEEPLESSNESS ASSOCIATED WITH ANXIETY: ICD-10-CM

## 2021-04-13 DIAGNOSIS — F41.9 HYPOSOMNIA, INSOMNIA OR SLEEPLESSNESS ASSOCIATED WITH ANXIETY: ICD-10-CM

## 2021-04-14 RX ORDER — TEMAZEPAM 15 MG/1
CAPSULE ORAL
Qty: 60 CAPSULE | Refills: 0 | OUTPATIENT
Start: 2021-04-14

## 2021-05-13 ENCOUNTER — PATIENT OUTREACH (OUTPATIENT)
Dept: ADMINISTRATIVE | Facility: HOSPITAL | Age: 66
End: 2021-05-13

## 2021-05-13 ENCOUNTER — TELEPHONE (OUTPATIENT)
Dept: ADMINISTRATIVE | Facility: HOSPITAL | Age: 66
End: 2021-05-13

## 2021-05-13 DIAGNOSIS — Z12.11 SCREEN FOR COLON CANCER: Primary | ICD-10-CM

## 2021-06-07 PROBLEM — Z00.00 WELLNESS EXAMINATION: Status: RESOLVED | Noted: 2020-01-09 | Resolved: 2021-06-07

## 2021-07-07 ENCOUNTER — PATIENT MESSAGE (OUTPATIENT)
Dept: ADMINISTRATIVE | Facility: HOSPITAL | Age: 66
End: 2021-07-07

## 2021-07-09 ENCOUNTER — TELEPHONE (OUTPATIENT)
Dept: ADMINISTRATIVE | Facility: HOSPITAL | Age: 66
End: 2021-07-09

## 2021-07-14 ENCOUNTER — TELEPHONE (OUTPATIENT)
Dept: FAMILY MEDICINE | Facility: CLINIC | Age: 66
End: 2021-07-14

## 2021-07-14 DIAGNOSIS — K57.92 DIVERTICULITIS: Primary | ICD-10-CM

## 2021-07-14 RX ORDER — MOXIFLOXACIN HYDROCHLORIDE 400 MG/1
400 TABLET ORAL DAILY
Qty: 10 TABLET | Refills: 0 | Status: SHIPPED | OUTPATIENT
Start: 2021-07-14 | End: 2023-06-13

## 2021-07-20 ENCOUNTER — PATIENT OUTREACH (OUTPATIENT)
Dept: ADMINISTRATIVE | Facility: HOSPITAL | Age: 66
End: 2021-07-20

## 2021-07-20 ENCOUNTER — TELEPHONE (OUTPATIENT)
Dept: ADMINISTRATIVE | Facility: HOSPITAL | Age: 66
End: 2021-07-20

## 2021-07-29 ENCOUNTER — PATIENT OUTREACH (OUTPATIENT)
Dept: ADMINISTRATIVE | Facility: HOSPITAL | Age: 66
End: 2021-07-29

## 2021-07-29 ENCOUNTER — TELEPHONE (OUTPATIENT)
Dept: ADMINISTRATIVE | Facility: HOSPITAL | Age: 66
End: 2021-07-29

## 2021-08-06 ENCOUNTER — PATIENT OUTREACH (OUTPATIENT)
Dept: ADMINISTRATIVE | Facility: HOSPITAL | Age: 66
End: 2021-08-06

## 2021-08-06 ENCOUNTER — TELEPHONE (OUTPATIENT)
Dept: ADMINISTRATIVE | Facility: HOSPITAL | Age: 66
End: 2021-08-06

## 2021-08-12 ENCOUNTER — OFFICE VISIT (OUTPATIENT)
Dept: FAMILY MEDICINE | Facility: CLINIC | Age: 66
End: 2021-08-12
Payer: COMMERCIAL

## 2021-08-12 VITALS
OXYGEN SATURATION: 98 % | DIASTOLIC BLOOD PRESSURE: 74 MMHG | BODY MASS INDEX: 21.75 KG/M2 | SYSTOLIC BLOOD PRESSURE: 110 MMHG | TEMPERATURE: 98 F | HEART RATE: 76 BPM | WEIGHT: 118.19 LBS | HEIGHT: 62 IN

## 2021-08-12 DIAGNOSIS — K57.90 DIVERTICULAR DISEASE: ICD-10-CM

## 2021-08-12 DIAGNOSIS — Z00.00 WELLNESS EXAMINATION: ICD-10-CM

## 2021-08-12 DIAGNOSIS — E03.9 HYPOTHYROIDISM, UNSPECIFIED TYPE: Primary | ICD-10-CM

## 2021-08-12 DIAGNOSIS — Z87.19 HISTORY OF PANCREATITIS: ICD-10-CM

## 2021-08-12 DIAGNOSIS — Z12.11 SCREENING FOR COLORECTAL CANCER: ICD-10-CM

## 2021-08-12 DIAGNOSIS — E78.5 DYSLIPIDEMIA: ICD-10-CM

## 2021-08-12 DIAGNOSIS — Z12.12 SCREENING FOR COLORECTAL CANCER: ICD-10-CM

## 2021-08-12 DIAGNOSIS — I10 HYPERTENSION, UNSPECIFIED TYPE: ICD-10-CM

## 2021-08-12 PROCEDURE — 1126F AMNT PAIN NOTED NONE PRSNT: CPT | Mod: CPTII,S$GLB,, | Performed by: INTERNAL MEDICINE

## 2021-08-12 PROCEDURE — 3008F PR BODY MASS INDEX (BMI) DOCUMENTED: ICD-10-PCS | Mod: CPTII,S$GLB,, | Performed by: INTERNAL MEDICINE

## 2021-08-12 PROCEDURE — 99213 OFFICE O/P EST LOW 20 MIN: CPT | Mod: S$GLB,,, | Performed by: INTERNAL MEDICINE

## 2021-08-12 PROCEDURE — 3074F SYST BP LT 130 MM HG: CPT | Mod: CPTII,S$GLB,, | Performed by: INTERNAL MEDICINE

## 2021-08-12 PROCEDURE — 1159F MED LIST DOCD IN RCRD: CPT | Mod: CPTII,S$GLB,, | Performed by: INTERNAL MEDICINE

## 2021-08-12 PROCEDURE — 99213 PR OFFICE/OUTPT VISIT, EST, LEVL III, 20-29 MIN: ICD-10-PCS | Mod: S$GLB,,, | Performed by: INTERNAL MEDICINE

## 2021-08-12 PROCEDURE — 3078F PR MOST RECENT DIASTOLIC BLOOD PRESSURE < 80 MM HG: ICD-10-PCS | Mod: CPTII,S$GLB,, | Performed by: INTERNAL MEDICINE

## 2021-08-12 PROCEDURE — 99999 PR PBB SHADOW E&M-EST. PATIENT-LVL III: CPT | Mod: PBBFAC,,, | Performed by: INTERNAL MEDICINE

## 2021-08-12 PROCEDURE — 3074F PR MOST RECENT SYSTOLIC BLOOD PRESSURE < 130 MM HG: ICD-10-PCS | Mod: CPTII,S$GLB,, | Performed by: INTERNAL MEDICINE

## 2021-08-12 PROCEDURE — 3008F BODY MASS INDEX DOCD: CPT | Mod: CPTII,S$GLB,, | Performed by: INTERNAL MEDICINE

## 2021-08-12 PROCEDURE — 3078F DIAST BP <80 MM HG: CPT | Mod: CPTII,S$GLB,, | Performed by: INTERNAL MEDICINE

## 2021-08-12 PROCEDURE — 99999 PR PBB SHADOW E&M-EST. PATIENT-LVL III: ICD-10-PCS | Mod: PBBFAC,,, | Performed by: INTERNAL MEDICINE

## 2021-08-12 PROCEDURE — 1159F PR MEDICATION LIST DOCUMENTED IN MEDICAL RECORD: ICD-10-PCS | Mod: CPTII,S$GLB,, | Performed by: INTERNAL MEDICINE

## 2021-08-12 PROCEDURE — 1126F PR PAIN SEVERITY QUANTIFIED, NO PAIN PRESENT: ICD-10-PCS | Mod: CPTII,S$GLB,, | Performed by: INTERNAL MEDICINE

## 2021-08-25 LAB — NONINV COLON CA DNA+OCC BLD SCRN STL QL: NEGATIVE

## 2021-09-22 ENCOUNTER — TELEPHONE (OUTPATIENT)
Dept: FAMILY MEDICINE | Facility: CLINIC | Age: 66
End: 2021-09-22

## 2021-09-22 RX ORDER — POTASSIUM CHLORIDE 20 MEQ/15ML
20 SOLUTION ORAL DAILY
Qty: 473 ML | Refills: 6 | Status: SHIPPED | OUTPATIENT
Start: 2021-09-22 | End: 2021-12-21

## 2021-11-05 ENCOUNTER — TELEPHONE (OUTPATIENT)
Dept: FAMILY MEDICINE | Facility: CLINIC | Age: 66
End: 2021-11-05
Payer: COMMERCIAL

## 2021-11-05 DIAGNOSIS — R06.2 WHEEZING: Primary | ICD-10-CM

## 2021-11-11 RX ORDER — ALBUTEROL SULFATE 90 UG/1
2 AEROSOL, METERED RESPIRATORY (INHALATION) EVERY 6 HOURS PRN
Qty: 18 G | Refills: 0 | Status: SHIPPED | OUTPATIENT
Start: 2021-11-11 | End: 2022-03-10

## 2021-11-18 ENCOUNTER — TELEPHONE (OUTPATIENT)
Dept: FAMILY MEDICINE | Facility: CLINIC | Age: 66
End: 2021-11-18
Payer: COMMERCIAL

## 2022-01-11 DIAGNOSIS — E03.9 HYPOTHYROIDISM, UNSPECIFIED TYPE: ICD-10-CM

## 2022-01-11 RX ORDER — LEVOTHYROXINE SODIUM 75 UG/1
TABLET ORAL
Qty: 90 TABLET | Refills: 3 | Status: SHIPPED | OUTPATIENT
Start: 2022-01-11 | End: 2022-12-27

## 2022-01-11 NOTE — TELEPHONE ENCOUNTER
Care Due:                  Date            Visit Type   Department     Provider  --------------------------------------------------------------------------------                                MYCHART                              ANNUAL                              CHECKUP/PHY  DESC FAMILY  Last Visit: 08-      [facility]  Noreen Venegasyg  Next Visit: None Scheduled  None         None Found                                                            Last  Test          Frequency    Reason                     Performed    Due Date  --------------------------------------------------------------------------------    CMP.........  12 months..  lisinopriL-hydrochlorothi  Not Found    Overdue                             azide....................    TSH.........  12 months..  levothyroxine............  02- 02-    Powered by [employer]. Reference number: [id number].   1/11/2022 9:16:19 AM CST

## 2022-03-10 ENCOUNTER — OFFICE VISIT (OUTPATIENT)
Dept: INTERNAL MEDICINE | Facility: CLINIC | Age: 67
End: 2022-03-10
Payer: COMMERCIAL

## 2022-03-10 ENCOUNTER — PATIENT OUTREACH (OUTPATIENT)
Dept: ADMINISTRATIVE | Facility: HOSPITAL | Age: 67
End: 2022-03-10
Payer: COMMERCIAL

## 2022-03-10 ENCOUNTER — TELEPHONE (OUTPATIENT)
Dept: INTERNAL MEDICINE | Facility: CLINIC | Age: 67
End: 2022-03-10
Payer: COMMERCIAL

## 2022-03-10 ENCOUNTER — LAB VISIT (OUTPATIENT)
Dept: LAB | Facility: HOSPITAL | Age: 67
End: 2022-03-10
Attending: INTERNAL MEDICINE
Payer: COMMERCIAL

## 2022-03-10 VITALS
HEART RATE: 68 BPM | OXYGEN SATURATION: 98 % | DIASTOLIC BLOOD PRESSURE: 76 MMHG | SYSTOLIC BLOOD PRESSURE: 120 MMHG | TEMPERATURE: 98 F | WEIGHT: 119.69 LBS | BODY MASS INDEX: 22.03 KG/M2 | HEIGHT: 62 IN

## 2022-03-10 DIAGNOSIS — I10 HYPERTENSION, UNSPECIFIED TYPE: ICD-10-CM

## 2022-03-10 DIAGNOSIS — F51.05 HYPOSOMNIA, INSOMNIA OR SLEEPLESSNESS ASSOCIATED WITH ANXIETY: ICD-10-CM

## 2022-03-10 DIAGNOSIS — Z00.00 WELLNESS EXAMINATION: Primary | ICD-10-CM

## 2022-03-10 DIAGNOSIS — E03.9 HYPOTHYROIDISM, UNSPECIFIED TYPE: ICD-10-CM

## 2022-03-10 DIAGNOSIS — K57.90 DIVERTICULAR DISEASE: ICD-10-CM

## 2022-03-10 DIAGNOSIS — E78.5 DYSLIPIDEMIA: ICD-10-CM

## 2022-03-10 DIAGNOSIS — Z23 NEED FOR DIPHTHERIA-TETANUS-PERTUSSIS (TDAP) VACCINE: ICD-10-CM

## 2022-03-10 DIAGNOSIS — Z12.31 ENCOUNTER FOR SCREENING MAMMOGRAM FOR MALIGNANT NEOPLASM OF BREAST: ICD-10-CM

## 2022-03-10 DIAGNOSIS — F41.9 HYPOSOMNIA, INSOMNIA OR SLEEPLESSNESS ASSOCIATED WITH ANXIETY: ICD-10-CM

## 2022-03-10 LAB
ALBUMIN SERPL BCP-MCNC: 4.6 G/DL (ref 3.5–5.2)
ALP SERPL-CCNC: 91 U/L (ref 55–135)
ALT SERPL W/O P-5'-P-CCNC: 22 U/L (ref 10–44)
ANION GAP SERPL CALC-SCNC: 12 MMOL/L (ref 8–16)
AST SERPL-CCNC: 21 U/L (ref 10–40)
BILIRUB SERPL-MCNC: 0.7 MG/DL (ref 0.1–1)
BUN SERPL-MCNC: 15 MG/DL (ref 8–23)
CALCIUM SERPL-MCNC: 10.5 MG/DL (ref 8.7–10.5)
CHLORIDE SERPL-SCNC: 100 MMOL/L (ref 95–110)
CHOLEST SERPL-MCNC: 340 MG/DL (ref 120–199)
CHOLEST/HDLC SERPL: 2.9 {RATIO} (ref 2–5)
CO2 SERPL-SCNC: 25 MMOL/L (ref 23–29)
CREAT SERPL-MCNC: 0.7 MG/DL (ref 0.5–1.4)
EST. GFR  (AFRICAN AMERICAN): >60 ML/MIN/1.73 M^2
EST. GFR  (NON AFRICAN AMERICAN): >60 ML/MIN/1.73 M^2
GLUCOSE SERPL-MCNC: 97 MG/DL (ref 70–110)
HDLC SERPL-MCNC: 118 MG/DL (ref 40–75)
HDLC SERPL: 34.7 % (ref 20–50)
LDLC SERPL CALC-MCNC: 204.2 MG/DL (ref 63–159)
NONHDLC SERPL-MCNC: 222 MG/DL
POTASSIUM SERPL-SCNC: 5.1 MMOL/L (ref 3.5–5.1)
PROT SERPL-MCNC: 7.9 G/DL (ref 6–8.4)
SODIUM SERPL-SCNC: 137 MMOL/L (ref 136–145)
T4 FREE SERPL-MCNC: 0.92 NG/DL (ref 0.71–1.51)
TRIGL SERPL-MCNC: 89 MG/DL (ref 30–150)
TSH SERPL DL<=0.005 MIU/L-ACNC: 1.23 UIU/ML (ref 0.4–4)

## 2022-03-10 PROCEDURE — 3078F PR MOST RECENT DIASTOLIC BLOOD PRESSURE < 80 MM HG: ICD-10-PCS | Mod: CPTII,S$GLB,, | Performed by: INTERNAL MEDICINE

## 2022-03-10 PROCEDURE — 1159F PR MEDICATION LIST DOCUMENTED IN MEDICAL RECORD: ICD-10-PCS | Mod: CPTII,S$GLB,, | Performed by: INTERNAL MEDICINE

## 2022-03-10 PROCEDURE — 84443 ASSAY THYROID STIM HORMONE: CPT | Performed by: INTERNAL MEDICINE

## 2022-03-10 PROCEDURE — 3008F PR BODY MASS INDEX (BMI) DOCUMENTED: ICD-10-PCS | Mod: CPTII,S$GLB,, | Performed by: INTERNAL MEDICINE

## 2022-03-10 PROCEDURE — 80053 COMPREHEN METABOLIC PANEL: CPT | Performed by: INTERNAL MEDICINE

## 2022-03-10 PROCEDURE — 99999 PR PBB SHADOW E&M-EST. PATIENT-LVL IV: ICD-10-PCS | Mod: PBBFAC,,, | Performed by: INTERNAL MEDICINE

## 2022-03-10 PROCEDURE — 1159F MED LIST DOCD IN RCRD: CPT | Mod: CPTII,S$GLB,, | Performed by: INTERNAL MEDICINE

## 2022-03-10 PROCEDURE — 99999 PR PBB SHADOW E&M-EST. PATIENT-LVL IV: CPT | Mod: PBBFAC,,, | Performed by: INTERNAL MEDICINE

## 2022-03-10 PROCEDURE — 36415 COLL VENOUS BLD VENIPUNCTURE: CPT | Performed by: INTERNAL MEDICINE

## 2022-03-10 PROCEDURE — 3078F DIAST BP <80 MM HG: CPT | Mod: CPTII,S$GLB,, | Performed by: INTERNAL MEDICINE

## 2022-03-10 PROCEDURE — 3074F PR MOST RECENT SYSTOLIC BLOOD PRESSURE < 130 MM HG: ICD-10-PCS | Mod: CPTII,S$GLB,, | Performed by: INTERNAL MEDICINE

## 2022-03-10 PROCEDURE — 90471 IMMUNIZATION ADMIN: CPT | Mod: S$GLB,,, | Performed by: INTERNAL MEDICINE

## 2022-03-10 PROCEDURE — 80061 LIPID PANEL: CPT | Performed by: INTERNAL MEDICINE

## 2022-03-10 PROCEDURE — 3074F SYST BP LT 130 MM HG: CPT | Mod: CPTII,S$GLB,, | Performed by: INTERNAL MEDICINE

## 2022-03-10 PROCEDURE — 99396 PR PREVENTIVE VISIT,EST,40-64: ICD-10-PCS | Mod: 25,S$GLB,, | Performed by: INTERNAL MEDICINE

## 2022-03-10 PROCEDURE — 3008F BODY MASS INDEX DOCD: CPT | Mod: CPTII,S$GLB,, | Performed by: INTERNAL MEDICINE

## 2022-03-10 PROCEDURE — 90715 TDAP VACCINE GREATER THAN OR EQUAL TO 7YO IM: ICD-10-PCS | Mod: S$GLB,,, | Performed by: INTERNAL MEDICINE

## 2022-03-10 PROCEDURE — 90715 TDAP VACCINE 7 YRS/> IM: CPT | Mod: S$GLB,,, | Performed by: INTERNAL MEDICINE

## 2022-03-10 PROCEDURE — 1160F RVW MEDS BY RX/DR IN RCRD: CPT | Mod: CPTII,S$GLB,, | Performed by: INTERNAL MEDICINE

## 2022-03-10 PROCEDURE — 90471 TDAP VACCINE GREATER THAN OR EQUAL TO 7YO IM: ICD-10-PCS | Mod: S$GLB,,, | Performed by: INTERNAL MEDICINE

## 2022-03-10 PROCEDURE — 84439 ASSAY OF FREE THYROXINE: CPT | Performed by: INTERNAL MEDICINE

## 2022-03-10 PROCEDURE — 1160F PR REVIEW ALL MEDS BY PRESCRIBER/CLIN PHARMACIST DOCUMENTED: ICD-10-PCS | Mod: CPTII,S$GLB,, | Performed by: INTERNAL MEDICINE

## 2022-03-10 PROCEDURE — 99396 PREV VISIT EST AGE 40-64: CPT | Mod: 25,S$GLB,, | Performed by: INTERNAL MEDICINE

## 2022-03-10 RX ORDER — TEMAZEPAM 15 MG/1
CAPSULE ORAL
Qty: 60 CAPSULE | Refills: 5 | Status: SHIPPED | OUTPATIENT
Start: 2022-03-10 | End: 2022-09-02 | Stop reason: SDUPTHER

## 2022-03-10 NOTE — LETTER
AUTHORIZATION FOR RELEASE OF   CONFIDENTIAL INFORMATION    Dear DIS,    We are seeing Essence Tapia, date of birth 1955, in the clinic at [facility]. Noreen Stephenson MD is the patient's PCP. Essence Tapia has an outstanding lab/procedure at the time we reviewed her chart. In order to help keep her health information updated, she has authorized us to request the following medical record(s):        (  )  MAMMOGRAM                                      (  )  COLONOSCOPY      (  )  PAP SMEAR                                          (  )  OUTSIDE LAB RESULTS     (X)  DEXA SCAN                                          (  )  EYE EXAM            (  )  FOOT EXAM                                          (  )  ENTIRE RECORD     (  )  OUTSIDE IMMUNIZATIONS                 (  )  _______________         Please fax records to [facility], Abby E. Gandolfi, MD, [phone]     If you have any questions, please contact Sydnee at [phone].           Patient Name: Essence Tapia  : 1955  Patient Phone #: [phone]

## 2022-03-10 NOTE — TELEPHONE ENCOUNTER
----- Message from Noreen Stephenson MD sent at 3/10/2022 11:40 AM CST -----  Dexa report dr zhou/dis

## 2022-03-10 NOTE — PROGRESS NOTES
[facility] Note    Chief Complaint      Chief Complaint   Patient presents with    Annual Exam     History of Present Illness      Essence Tapia is a 66 y.o. female who presents today for chief complaint annual wellness.      HPI   Annual wellness feels well no complaints   bp at goal, taking K supplement sometimes  Euthyroid will do labs   Uses temazepam prn sleep   Has been fine in terms of diverticulitis   Gyn Dr Lozano mmg and pap utd, Says she had dexa in   [facility] DUE AFTER 8.3    considering breast reduction Dr Guerra thinks she can get covered bc of back pain     Exercising 12k steps daily, not using elliptical , watchng diet, job is active  No mood or sleep problems  Active Problem List with Overview Notes    Diagnosis Date Noted    Diverticular disease 2020     Sees jabier, had complicated admission in 2018 for diverticulitis with pseudomonal bacteremia         History of pancreatitis 2020    Hypertension 2020    Hyposomnia, insomnia or sleeplessness associated with anxiety 2020     Using prn temazepam, sometimes 15 and sometimes 30, taking drug holidays       Hypothyroid 2020    Screening for colon cancer 2020     Last scope dr osorio kay 2010         Health Maintenance   Topic Date Due    Mammogram  2022    Lipid Panel  2026    TETANUS VACCINE  03/10/2032    Hepatitis C Screening  Completed       Past Medical History:   Diagnosis Date    Diverticulitis     Edema     Spinal stenosis        Past Surgical History:   Procedure Laterality Date    APPENDECTOMY       SECTION, CLASSIC      NASAL RECONSTRUCTION      SIGMOIDECTOMY         family history includes Breast cancer in her sister; No Known Problems in her father and mother.    Social History     Tobacco Use    Smoking status: Never Smoker    Smokeless tobacco: Never Used   Substance Use Topics    Alcohol use: Yes    Drug use: No       Review  of Systems   Constitutional: Negative for chills, fever, malaise/fatigue and weight loss.   Respiratory: Negative for cough, sputum production, shortness of breath and wheezing.    Cardiovascular: Negative for chest pain, palpitations, orthopnea and leg swelling.   Gastrointestinal: Negative for constipation, diarrhea, nausea and vomiting.   Genitourinary: Negative for dysuria, frequency, hematuria and urgency.        Outpatient Encounter Medications as of 3/10/2022   Medication Sig Note Dispense Refill    acyclovir (ZOVIRAX) 400 MG tablet Take 400 mg by mouth once daily. 2/23/2021: As needed      levothyroxine (SYNTHROID) 75 MCG tablet TAKE 1 TABLET BY MOUTH EVERY DAY  90 tablet 3    lisinopriL-hydrochlorothiazide (PRINZIDE,ZESTORETIC) 10-12.5 mg per tablet Take 1 tablet by mouth once daily.  90 tablet 3    moxifloxacin (AVELOX) 400 mg tablet Take 1 tablet (400 mg total) by mouth once daily.  10 tablet 0    [DISCONTINUED] temazepam (RESTORIL) 15 mg Cap TAKE 1 TO 2 CAPSULES BY MOUTH NIGHTLY AS NEEDED  60 capsule 5    temazepam (RESTORIL) 15 mg Cap TAKE 1 TO 2 CAPSULES BY MOUTH NIGHTLY AS NEEDED  60 capsule 5    [DISCONTINUED] albuterol (VENTOLIN HFA) 90 mcg/actuation inhaler Inhale 2 puffs into the lungs every 6 (six) hours as needed for Wheezing. Rescue  18 g 0    [DISCONTINUED] DERMA-SMOOTHE/FS SCALP OIL 0.01 % Oil Apply 1 application topically every evening.        No facility-administered encounter medications on file as of 3/10/2022.        Review of patient's allergies indicates:   Allergen Reactions    Codeine     Dilaudid [hydromorphone] Other (See Comments)     hallucinations    Hydrocodone Hives, Nausea And Vomiting and Other (See Comments)     hallucinations    Latex Itching    Tramadol     Zofran [ondansetron hcl (pf)]            Physical Exam      Vital Signs  Temp: 97.7 °F (36.5 °C)  Temp src: Oral  Pulse: 68  SpO2: 98 %  BP: 120/76  BP Location: Left arm  Patient Position: Sitting  Pain  "Score: 0-No pain  Height and Weight  Height: 5' 2" (157.5 cm)  Weight: 54.3 kg (119 lb 11.4 oz)  BSA (Calculated - sq m): 1.54 sq meters  BMI (Calculated): 21.9  Weight in (lb) to have BMI = 25: 136.4]    Physical Exam  Vitals reviewed.   Constitutional:       General: She is not in acute distress.     Appearance: She is well-developed. She is not diaphoretic.   HENT:      Head: Normocephalic and atraumatic.      Right Ear: External ear normal.      Left Ear: External ear normal.      Nose: Nose normal.   Eyes:      General:         Right eye: No discharge.         Left eye: No discharge.      Conjunctiva/sclera: Conjunctivae normal.   Cardiovascular:      Rate and Rhythm: Normal rate and regular rhythm.      Heart sounds: Normal heart sounds.   Pulmonary:      Effort: Pulmonary effort is normal. No respiratory distress.      Breath sounds: Normal breath sounds. No wheezing.   Musculoskeletal:         General: Normal range of motion.      Cervical back: Normal range of motion.   Skin:     Coloration: Skin is not pale.      Findings: No rash.   Neurological:      Mental Status: She is alert and oriented to person, place, and time.   Psychiatric:         Behavior: Behavior normal.         Thought Content: Thought content normal.         Judgment: Judgment normal.          Laboratory:  CBC:  No results for input(s): WBC, RBC, HGB, HCT, PLT, MCV, MCH, MCHC in the last 2160 hours.  CMP:  No results for input(s): GLU, CALCIUM, ALBUMIN, PROT, NA, K, CO2, CL, BUN, ALKPHOS, ALT, AST, BILITOT in the last 2160 hours.    Invalid input(s): CREATININ  URINALYSIS:  No results for input(s): COLORU, CLARITYU, SPECGRAV, PHUR, PROTEINUA, GLUCOSEU, BILIRUBINCON, BLOODU, WBCU, RBCU, BACTERIA, MUCUS, NITRITE, LEUKOCYTESUR, UROBILINOGEN, HYALINECASTS in the last 2160 hours.   LIPIDS:  No results for input(s): TSH, HDL, CHOL, TRIG, LDLCALC, CHOLHDL, NONHDLCHOL, TOTALCHOLEST in the last 2160 hours.  TSH:  No results for input(s): TSH in the " last 2160 hours.  A1C:  No results for input(s): HGBA1C in the last 2160 hours.        Assessment/Plan     sEsence Tapia is a 66 y.o.female with:  Wellness  Due for labs    Hyposomnia, insomnia or sleeplessness associated with anxiety  Continue prn temazepam     Hypertension  At goal     Hypothyroid  Need labs     Diverticular disease  No issues       Orders Placed This Encounter   Procedures    Mammo Digital Screening Bilat w/ [facility]ner     Standing Status:   Future     Number of Occurrences:   1     Standing Expiration Date:   3/10/2024     Order Specific Question:   Reason for Exam:     Answer:   SCREENING MAMMOGRAM AFTER 8/3/2022     Order Specific Question:   May the Radiologist modify the order per protocol to meet the clinical needs of the patient?     Answer:   Yes     Order Specific Question:   Release to patient     Answer:   Immediate    Tdap Vaccine    Lipid Panel     Standing Status:   Future     Number of Occurrences:   1     Standing Expiration Date:   5/9/2023    Comprehensive Metabolic Panel     Standing Status:   Future     Number of Occurrences:   1     Standing Expiration Date:   5/9/2023    T4, Free     Standing Status:   Future     Number of Occurrences:   1     Standing Expiration Date:   5/10/2023    TSH     Standing Status:   Future     Number of Occurrences:   1     Standing Expiration Date:   5/10/2023       Use of the [facility] Patient Portal discussed and encouraged during today's visit  -Continue current medications and maintain follow up with specialists.  Return to clinic in 12 months.  No future appointments.    Noreen Stephenson MD  3/10/2022 11:33 AM    Primary Care Internal Medicine

## 2022-03-10 NOTE — PROGRESS NOTES
Health Maintenance Due   Topic Date Due    Shingles Vaccine (1 of 2) Never done    COVID-19 Vaccine (2 - Pfizer 3-dose series) 07/14/2021    Influenza Vaccine (1) Never done     Triggered LINKS & reconciled immunizations.  Updated Care Everywhere.  Imported outside DEXA scan results into chart.  Chart review completed.

## 2022-03-15 ENCOUNTER — PATIENT OUTREACH (OUTPATIENT)
Dept: ADMINISTRATIVE | Facility: HOSPITAL | Age: 67
End: 2022-03-15
Payer: COMMERCIAL

## 2022-03-15 NOTE — PROGRESS NOTES
Health Maintenance Due   Topic Date Due    Shingles Vaccine (1 of 2) Never done    COVID-19 Vaccine (2 - Pfizer 3-dose series) 07/14/2021    Influenza Vaccine (1) Never done        updated.     Ada Lyman LPN   Clinical Care Coordinator  Primary Care and Wellness

## 2022-03-17 RX ORDER — LISINOPRIL AND HYDROCHLOROTHIAZIDE 10; 12.5 MG/1; MG/1
1 TABLET ORAL DAILY
Qty: 90 TABLET | Refills: 3 | Status: SHIPPED | OUTPATIENT
Start: 2022-03-17 | End: 2022-09-30

## 2022-03-17 RX ORDER — LISINOPRIL AND HYDROCHLOROTHIAZIDE 10; 12.5 MG/1; MG/1
TABLET ORAL
Qty: 90 TABLET | Refills: 3 | Status: SHIPPED | OUTPATIENT
Start: 2022-03-17 | End: 2022-09-30

## 2022-03-17 NOTE — TELEPHONE ENCOUNTER
Refill Authorization Note   Essence Tapia  is requesting a refill authorization.  Brief Assessment and Rationale for Refill:  Approve     Medication Therapy Plan:       Medication Reconciliation Completed: No   Comments:   --->Care Gap information included below if applicable.   Orders Placed This Encounter    lisinopriL-hydrochlorothiazide (PRINZIDE,ZESTORETIC) 10-12.5 mg per tablet      Requested Prescriptions   Signed Prescriptions Disp Refills    lisinopriL-hydrochlorothiazide (PRINZIDE,ZESTORETIC) 10-12.5 mg per tablet 90 tablet 3     Sig: TAKE 1 TABLET BY MOUTH EVERY DAY       Diuretics Protocol Passed - 3/17/2022 11:36 AM        Passed - Visit with authorizing provider in past 12 months or upcoming 90 days         Passed - Blood Pressure below 139/89 on file in past 12 months      BP Readings from Last 3 Encounters:   03/10/22 120/76   08/12/21 110/74   02/23/21 116/82             Passed - Serum Potassium between 3.5 to 5.2 on file in the past 12 months     Lab Results   Component Value Date    K 5.1 03/10/2022    K 4.7 02/23/2021    K 4.2 01/09/2020                  Passed - Serum Creatinine less than 1.4 on file in the past 12 months     Lab Results   Component Value Date    CREATININE 0.7 03/10/2022    CREATININE 0.65 02/23/2021    CREATININE 0.7 01/09/2020     Lab Results   Component Value Date    EGFRNONAA >60.0 03/10/2022    EGFRNONAA >60.0 02/23/2021    EGFRNONAA >60.0 01/09/2020               Passed - Serum Sodium between 130 to 148 on file in the past 12 months     Lab Results   Component Value Date     03/10/2022                 Cardiovascular:  ACEI + Diuretic Combos Passed - 3/17/2022 11:36 AM        Passed - Patient is at least 18 years old        Passed - Last BP in normal range within 360 days     BP Readings from Last 3 Encounters:   03/10/22 120/76   08/12/21 110/74   02/23/21 116/82               Passed - Valid encounter within last 15 months     Recent Visits  Date Type Provider  Dept   03/10/22 Office Visit Noreen Stephenson MD [facility]   08/12/21 Office Visit Noreen Stephenson MD [facility]   02/23/21 Office Visit Noreen Stephenson MD [facility]   05/04/20 Office Visit Noreen Stephenson MD [facility]   Showing recent visits within past 720 days and meeting all other requirements  Future Appointments  No visits were found meeting these conditions.  Showing future appointments within next 150 days and meeting all other requirements                Passed - Na is between 130 and 148 and within 360 days     Sodium   Date Value Ref Range Status   03/10/2022 137 136 - 145 mmol/L Final   02/23/2021 143 136 - 145 mmol/L Final   01/09/2020 138 136 - 145 mmol/L Final              Passed - K in normal range and within 360 days     Potassium   Date Value Ref Range Status   03/10/2022 5.1 3.5 - 5.1 mmol/L Final   02/23/2021 4.7 3.5 - 5.1 mmol/L Final   01/09/2020 4.2 3.5 - 5.1 mmol/L Final              Passed - Cr is 1.39 or below and within 360 days     Lab Results   Component Value Date    CREATININE 0.7 03/10/2022    CREATININE 0.65 02/23/2021    CREATININE 0.7 01/09/2020              Passed - eGFR within 360 days     Lab Results   Component Value Date    EGFRNONAA >60.0 03/10/2022    EGFRNONAA >60.0 02/23/2021    EGFRNONAA >60.0 01/09/2020                    Appointments  past 12m or future 3m with PCP    Date Provider   Last Visit   3/10/2022 Noreen Stephenson MD   Next Visit   3/17/2022 Noreen Stephenson MD   ED visits in past 90 days: 0     Note composed:11:38 AM 03/17/2022

## 2022-03-17 NOTE — TELEPHONE ENCOUNTER
No new care gaps identified.  Powered by [employer]. Reference number: [id number].   3/17/2022 10:16:44 AM CDT

## 2022-03-17 NOTE — TELEPHONE ENCOUNTER
----- Message from Vaishali Brown sent at 3/17/2022  9:45 AM CDT -----  Type:  RX Refill Request    Who Called: Essence   Refill or New Rx:Refill   RX Name and Strength:lisinopriL-hydrochlorothiazide (PRINZIDE,ZESTORETIC) 10-12.5 mg per tablet  How is the patient currently taking it? (ex. 1XDay):1 x day   Is this a 30 day or 90 day RX:90  Preferred Pharmacy with phone number:[facility]/PHARMACY #[id number] - [place], [place] - [address]  Local or Mail Order:local   Ordering Provider:Noreen Stephenson,  Would the patient rather a call back or a response via [facility]? Call back   Best Call Back Number:[phone]  Additional Information: Pharmacy told her to reach out to office for refill request

## 2022-03-22 ENCOUNTER — PATIENT MESSAGE (OUTPATIENT)
Dept: INTERNAL MEDICINE | Facility: CLINIC | Age: 67
End: 2022-03-22
Payer: COMMERCIAL

## 2022-03-22 RX ORDER — ATORVASTATIN CALCIUM 20 MG/1
20 TABLET, FILM COATED ORAL DAILY
Qty: 90 TABLET | Refills: 3 | Status: SHIPPED | OUTPATIENT
Start: 2022-03-22 | End: 2023-02-26

## 2022-04-09 ENCOUNTER — PATIENT MESSAGE (OUTPATIENT)
Dept: INTERNAL MEDICINE | Facility: CLINIC | Age: 67
End: 2022-04-09
Payer: COMMERCIAL

## 2022-04-11 ENCOUNTER — PATIENT MESSAGE (OUTPATIENT)
Dept: INTERNAL MEDICINE | Facility: CLINIC | Age: 67
End: 2022-04-11
Payer: COMMERCIAL

## 2022-05-10 ENCOUNTER — PATIENT MESSAGE (OUTPATIENT)
Dept: INTERNAL MEDICINE | Facility: CLINIC | Age: 67
End: 2022-05-10
Payer: COMMERCIAL

## 2022-05-23 ENCOUNTER — PATIENT MESSAGE (OUTPATIENT)
Dept: INTERNAL MEDICINE | Facility: CLINIC | Age: 67
End: 2022-05-23
Payer: COMMERCIAL

## 2022-08-07 NOTE — TELEPHONE ENCOUNTER
89 year old female with a history of HTN and HLD presents to the ED from [facility] with altered mental status and fever. On presentation, pt minimally responsive, only gives name, unable to collect history. Spoke with son Rohan over the phone. Pt was noted to be not herself, not recognizing her daughter at the NH, also spiked a fever at NH to 103 so brought to ED. As per son, at baseline pt ~AAO*2, has had progressive functional/cognitive decline over the last few months, sometimes forgets names but knows herself and kids, usually knows where she is, likely not know year/president, walks with walker, needs help dressing/bathroom. Son lives in [place], his sister lives on  and visits mother daily. As per ED note, Daughter has not noted cough, shortness of breath, vomiting, malodourous urine.    In the ED, pt given a dose of cefipime and 1L bolus NS  Vitals: T(F): .3 HR: 85 BP: 132/60 RR: 20SpO2: 96%  Labs: significant for Na 123, bnp 3300, Ua with small LE, wbc 15, no bacteria  ECG: Sinus rhythm with Premature supraventricular complexes  CXR: unread, on wet read questionable Left cpa opacity/effusion     Pt admitted for ams and fever     No new care gaps identified.  Powered by [employer]. Reference number: [id number].   3/17/2022 11:32:11 AM CDT

## 2022-08-18 LAB — BCS RECOMMENDATION EXT: NORMAL

## 2022-08-24 ENCOUNTER — PATIENT MESSAGE (OUTPATIENT)
Dept: ADMINISTRATIVE | Facility: HOSPITAL | Age: 67
End: 2022-08-24
Payer: COMMERCIAL

## 2022-08-26 ENCOUNTER — PATIENT OUTREACH (OUTPATIENT)
Dept: ADMINISTRATIVE | Facility: HOSPITAL | Age: 67
End: 2022-08-26
Payer: COMMERCIAL

## 2022-08-26 NOTE — PROGRESS NOTES
Health Maintenance Due   Topic Date Due    Shingles Vaccine (1 of 2) Never done    COVID-19 Vaccine (2 - Pfizer series) 07/14/2021    Pneumococcal Vaccines (Age 65+) (1 - PCV) Never done     Chart review done.  updated. Immunizations reviewed & updated. Care Everywhere updated.  Mammogram from 08/18/22 scanned into chart.

## 2022-09-20 ENCOUNTER — PATIENT MESSAGE (OUTPATIENT)
Dept: INTERNAL MEDICINE | Facility: CLINIC | Age: 67
End: 2022-09-20
Payer: COMMERCIAL

## 2022-09-20 DIAGNOSIS — E87.6 HYPOKALEMIA: ICD-10-CM

## 2022-09-20 DIAGNOSIS — I10 HYPERTENSION, UNSPECIFIED TYPE: Primary | ICD-10-CM

## 2022-09-30 RX ORDER — TRIAMTERENE AND HYDROCHLOROTHIAZIDE 37.5; 25 MG/1; MG/1
1 CAPSULE ORAL EVERY MORNING
Qty: 90 CAPSULE | Refills: 1 | Status: SHIPPED | OUTPATIENT
Start: 2022-09-30 | End: 2023-06-13

## 2022-12-13 DIAGNOSIS — Z01.810 PRE-OPERATIVE CARDIOVASCULAR EXAMINATION: Primary | ICD-10-CM

## 2022-12-20 ENCOUNTER — HOSPITAL ENCOUNTER (OUTPATIENT)
Dept: CARDIOLOGY | Facility: CLINIC | Age: 67
Discharge: HOME | End: 2022-12-20
Payer: MEDICARE

## 2022-12-20 DIAGNOSIS — Z01.810 PRE-OPERATIVE CARDIOVASCULAR EXAMINATION: ICD-10-CM

## 2022-12-20 PROCEDURE — 93010 ELECTROCARDIOGRAM REPORT: CPT | Mod: S$GLB,,, | Performed by: INTERNAL MEDICINE

## 2022-12-20 PROCEDURE — 93010 EKG 12-LEAD: ICD-10-PCS | Mod: S$GLB,,, | Performed by: INTERNAL MEDICINE

## 2022-12-27 DIAGNOSIS — E03.9 HYPOTHYROIDISM, UNSPECIFIED TYPE: ICD-10-CM

## 2022-12-27 RX ORDER — LEVOTHYROXINE SODIUM 75 UG/1
TABLET ORAL
Qty: 90 TABLET | Refills: 0 | Status: SHIPPED | OUTPATIENT
Start: 2022-12-27 | End: 2023-03-15

## 2022-12-27 NOTE — TELEPHONE ENCOUNTER
Refill Decision Note   Essence Tapia  is requesting a refill authorization.  Brief Assessment and Rationale for Refill:  Approve    -Medication-Related Problems Identified:   Requires appointment  Requires labs  Medication Therapy Plan:  CMP, LIPID PANEL    Medication Reconciliation Completed: No   Comments:     Provider Staff:     Action is required for this patient.   Please see care gap opportunities below in Care Due Message.     Thanks!  [facility]     Appointments      Date Provider   Last Visit   3/10/2022 Noreen Stephenson MD   Next Visit   Visit date not found Noreen Stephenson MD     Note composed:12:39 PM 12/27/2022           Note composed:12:39 PM 12/27/2022

## 2022-12-27 NOTE — TELEPHONE ENCOUNTER
Care Due:                  Date            Visit Type   Department     Provider  --------------------------------------------------------------------------------                                EP -                              PRIMARY      [facility] PRIMARY  Last Visit: 03-      CARE ([facility])   CARE           Noreen Stephenson  Next Visit: None Scheduled  None         None Found                                                            Last  Test          Frequency    Reason                     Performed    Due Date  --------------------------------------------------------------------------------    Office Visit  12 months..  atorvastatin,              03-   03-                             triamterene-hydrochloroth                             iazide...................    CMP.........  12 months..  atorvastatin,              03-   03-                             triamterene-hydrochloroth                             iazide...................    Lipid Panel.  12 months..  atorvastatin.............  03-   03-    [facility] Embedded Care Gaps. Reference number: [id number]. 12/27/2022   7:51:27 AM CST

## 2022-12-28 ENCOUNTER — TELEPHONE (OUTPATIENT)
Dept: INTERNAL MEDICINE | Facility: CLINIC | Age: 67
End: 2022-12-28
Payer: COMMERCIAL

## 2022-12-28 DIAGNOSIS — Z78.0 MENOPAUSE: ICD-10-CM

## 2022-12-28 NOTE — TELEPHONE ENCOUNTER
"Spoke to pt. Only wants to talk to you.      Having elective surgery on 1-9. Wants to talk to you about pain meds and her BP. "And some other things" wouldn't give me any more info.   "

## 2022-12-28 NOTE — TELEPHONE ENCOUNTER
----- Message from Magdalena Castanon sent at 12/28/2022  9:38 AM CST -----  Contact: pt/[phone]  Type:  Patient Call    Who Called:pt    Would the patient rather a call back or a response via [facility]? Call   Best Call Back Number:[phone]  Additional Information: Pt  has concerns  regarding  her  up coming  procedure  that is  coming up and  need to  speak  with someone in the  office

## 2022-12-30 ENCOUNTER — TELEPHONE (OUTPATIENT)
Dept: INTERNAL MEDICINE | Facility: CLINIC | Age: 67
End: 2022-12-30
Payer: COMMERCIAL

## 2022-12-30 RX ORDER — PROMETHAZINE HYDROCHLORIDE AND DEXTROMETHORPHAN HYDROBROMIDE 6.25; 15 MG/5ML; MG/5ML
5 SYRUP ORAL EVERY 8 HOURS PRN
Qty: 75 ML | Refills: 0 | Status: SHIPPED | OUTPATIENT
Start: 2022-12-30 | End: 2023-01-09

## 2022-12-30 NOTE — TELEPHONE ENCOUNTER
----- Message from Chanda Blackman sent at 12/30/2022 10:28 AM CST -----  Regarding: Medical Assistance  Contact: Patient  Patient wanted to give a reminder for physician to contact her   Patient spoke with office on yesterday and was advised she would get a call back   Please Assist     Patient can be reached at [phone]

## 2022-12-30 NOTE — TELEPHONE ENCOUNTER
Spoke with pt, she switchd back to lisinopril- hctz  No longer taking dyazide    Has been having nausea since having iron supplement they wanted her to take preop having plastic surgery dr thor johnson   Having nausea and emesis     Cannot  juan zofran, promethazine PO or rectal    Wants a liquid nausea medicine

## 2023-01-12 ENCOUNTER — PATIENT MESSAGE (OUTPATIENT)
Dept: INTERNAL MEDICINE | Facility: CLINIC | Age: 68
End: 2023-01-12
Payer: COMMERCIAL

## 2023-02-06 ENCOUNTER — TELEPHONE (OUTPATIENT)
Dept: PRIMARY CARE CLINIC | Facility: CLINIC | Age: 68
End: 2023-02-06
Payer: MEDICARE

## 2023-02-06 ENCOUNTER — TELEPHONE (OUTPATIENT)
Dept: FAMILY MEDICINE | Facility: CLINIC | Age: 68
End: 2023-02-06
Payer: MEDICARE

## 2023-02-06 DIAGNOSIS — R05.9 COUGH, UNSPECIFIED TYPE: Primary | ICD-10-CM

## 2023-02-06 RX ORDER — PROMETHAZINE HYDROCHLORIDE AND DEXTROMETHORPHAN HYDROBROMIDE 6.25; 15 MG/5ML; MG/5ML
5 SYRUP ORAL EVERY 6 HOURS PRN
Qty: 300 ML | Refills: 0 | Status: SHIPPED | OUTPATIENT
Start: 2023-02-06 | End: 2023-02-16

## 2023-02-06 NOTE — TELEPHONE ENCOUNTER
Pt states she tested positive for covid today, has cough. States last time you only called in a little bit, asking to have more called in?

## 2023-02-06 NOTE — TELEPHONE ENCOUNTER
----- Message from Erick Henry sent at 2/6/2023 10:56 AM CST -----  Type:  Needs Medical Advice    Who Called: self  Reason:refill on promethazine-dextromethorphan (PROMETHAZINE-DM) 6.25-15 mg/5 mL Syrp  Would the patient rather a call back or a response via [facility]? call  Best Call Back Number: [facility]/pharmacy #[id number] - [place] - [address]   Phone:  [phone]  Fax:  [phone]        Additional Information: frank

## 2023-02-24 ENCOUNTER — PATIENT MESSAGE (OUTPATIENT)
Dept: INTERNAL MEDICINE | Facility: CLINIC | Age: 68
End: 2023-02-24
Payer: MEDICARE

## 2023-02-24 DIAGNOSIS — F41.9 HYPOSOMNIA, INSOMNIA OR SLEEPLESSNESS ASSOCIATED WITH ANXIETY: ICD-10-CM

## 2023-02-24 DIAGNOSIS — F51.05 HYPOSOMNIA, INSOMNIA OR SLEEPLESSNESS ASSOCIATED WITH ANXIETY: ICD-10-CM

## 2023-02-26 RX ORDER — TEMAZEPAM 15 MG/1
CAPSULE ORAL
Qty: 60 CAPSULE | Refills: 5 | Status: SHIPPED | OUTPATIENT
Start: 2023-02-26 | End: 2023-05-25 | Stop reason: SDUPTHER

## 2023-02-26 NOTE — TELEPHONE ENCOUNTER
Care Due:                  Date            Visit Type   Department     Provider  --------------------------------------------------------------------------------                                EP -                              PRIMARY      [facility] PRIMARY  Last Visit: 03-      CARE ([facility])   CARE           Noreen Stephenson  Next Visit: None Scheduled  None         None Found                                                            Last  Test          Frequency    Reason                     Performed    Due Date  --------------------------------------------------------------------------------    Office Visit  12 months..  triamterene-hydrochloroth  03-   03-                             iazide...................    CMP.........  12 months..  triamterene-hydrochloroth  03-   03-                             iazide...................    [employer] Embedded Care Gaps. Reference number: [id number]. 2/26/2023   5:49:49 PM CST

## 2023-04-13 ENCOUNTER — PATIENT MESSAGE (OUTPATIENT)
Dept: PRIMARY CARE CLINIC | Facility: CLINIC | Age: 68
End: 2023-04-13
Payer: MEDICARE

## 2023-04-13 DIAGNOSIS — R53.83 FATIGUE, UNSPECIFIED TYPE: Primary | ICD-10-CM

## 2023-04-14 ENCOUNTER — PATIENT MESSAGE (OUTPATIENT)
Dept: PRIMARY CARE CLINIC | Facility: CLINIC | Age: 68
End: 2023-04-14
Payer: MEDICARE

## 2023-04-14 ENCOUNTER — HOSPITAL ENCOUNTER (EMERGENCY)
Facility: HOSPITAL | Age: 68
Discharge: HOME | End: 2023-04-15
Attending: EMERGENCY MEDICINE
Payer: MEDICARE

## 2023-04-14 DIAGNOSIS — R07.9 CHEST PAIN: ICD-10-CM

## 2023-04-14 DIAGNOSIS — F41.9 ANXIETY: ICD-10-CM

## 2023-04-14 DIAGNOSIS — R10.9 ABDOMINAL PAIN, UNSPECIFIED ABDOMINAL LOCATION: Primary | ICD-10-CM

## 2023-04-14 LAB
ALBUMIN SERPL BCP-MCNC: 4.5 G/DL (ref 3.5–5.2)
ALP SERPL-CCNC: 95 U/L (ref 55–135)
ALT SERPL W/O P-5'-P-CCNC: 26 U/L (ref 10–44)
ANION GAP SERPL CALC-SCNC: 16 MMOL/L (ref 8–16)
AST SERPL-CCNC: 25 U/L (ref 10–40)
BASOPHILS # BLD AUTO: 0.03 K/UL (ref 0–0.2)
BASOPHILS NFR BLD: 0.5 % (ref 0–1.9)
BILIRUB SERPL-MCNC: 0.5 MG/DL (ref 0.1–1)
BNP SERPL-MCNC: 42 PG/ML (ref 0–99)
BUN SERPL-MCNC: 8 MG/DL (ref 8–23)
CALCIUM SERPL-MCNC: 9.8 MG/DL (ref 8.7–10.5)
CHLORIDE SERPL-SCNC: 96 MMOL/L (ref 95–110)
CO2 SERPL-SCNC: 19 MMOL/L (ref 23–29)
CREAT SERPL-MCNC: 0.7 MG/DL (ref 0.5–1.4)
DIFFERENTIAL METHOD: ABNORMAL
EOSINOPHIL # BLD AUTO: 0 K/UL (ref 0–0.5)
EOSINOPHIL NFR BLD: 0.7 % (ref 0–8)
ERYTHROCYTE [DISTWIDTH] IN BLOOD BY AUTOMATED COUNT: 11.5 % (ref 11.5–14.5)
EST. GFR  (NO RACE VARIABLE): >60 ML/MIN/1.73 M^2
GLUCOSE SERPL-MCNC: 108 MG/DL (ref 70–110)
HCT VFR BLD AUTO: 38.8 % (ref 37–48.5)
HCV AB SERPL QL IA: NORMAL
HGB BLD-MCNC: 13.5 G/DL (ref 12–16)
HIV 1+2 AB+HIV1 P24 AG SERPL QL IA: NORMAL
IMM GRANULOCYTES # BLD AUTO: 0.05 K/UL (ref 0–0.04)
IMM GRANULOCYTES NFR BLD AUTO: 0.9 % (ref 0–0.5)
LYMPHOCYTES # BLD AUTO: 1.3 K/UL (ref 1–4.8)
LYMPHOCYTES NFR BLD: 23.6 % (ref 18–48)
MCH RBC QN AUTO: 32.9 PG (ref 27–31)
MCHC RBC AUTO-ENTMCNC: 34.8 G/DL (ref 32–36)
MCV RBC AUTO: 95 FL (ref 82–98)
MONOCYTES # BLD AUTO: 0.6 K/UL (ref 0.3–1)
MONOCYTES NFR BLD: 10.5 % (ref 4–15)
NEUTROPHILS # BLD AUTO: 3.5 K/UL (ref 1.8–7.7)
NEUTROPHILS NFR BLD: 63.8 % (ref 38–73)
NRBC BLD-RTO: 0 /100 WBC
PLATELET # BLD AUTO: 234 K/UL (ref 150–450)
PMV BLD AUTO: 9.3 FL (ref 9.2–12.9)
POTASSIUM SERPL-SCNC: 3.8 MMOL/L (ref 3.5–5.1)
PROT SERPL-MCNC: 7.9 G/DL (ref 6–8.4)
RBC # BLD AUTO: 4.1 M/UL (ref 4–5.4)
SODIUM SERPL-SCNC: 131 MMOL/L (ref 136–145)
TROPONIN I SERPL DL<=0.01 NG/ML-MCNC: <0.006 NG/ML (ref 0–0.03)
WBC # BLD AUTO: 5.51 K/UL (ref 3.9–12.7)

## 2023-04-14 PROCEDURE — 96361 HYDRATE IV INFUSION ADD-ON: CPT

## 2023-04-14 PROCEDURE — 86803 HEPATITIS C AB TEST: CPT | Performed by: PHYSICIAN ASSISTANT

## 2023-04-14 PROCEDURE — 93010 ELECTROCARDIOGRAM REPORT: CPT | Mod: ,,, | Performed by: INTERNAL MEDICINE

## 2023-04-14 PROCEDURE — 96374 THER/PROPH/DIAG INJ IV PUSH: CPT | Mod: 59

## 2023-04-14 PROCEDURE — 99285 EMERGENCY DEPT VISIT HI MDM: CPT | Mod: 25

## 2023-04-14 PROCEDURE — 99285 EMERGENCY DEPT VISIT HI MDM: CPT | Mod: GC,,, | Performed by: EMERGENCY MEDICINE

## 2023-04-14 PROCEDURE — 93010 EKG 12-LEAD: ICD-10-PCS | Mod: ,,, | Performed by: INTERNAL MEDICINE

## 2023-04-14 PROCEDURE — 85025 COMPLETE CBC W/AUTO DIFF WBC: CPT | Performed by: EMERGENCY MEDICINE

## 2023-04-14 PROCEDURE — 80053 COMPREHEN METABOLIC PANEL: CPT | Performed by: EMERGENCY MEDICINE

## 2023-04-14 PROCEDURE — 96375 TX/PRO/DX INJ NEW DRUG ADDON: CPT

## 2023-04-14 PROCEDURE — 84484 ASSAY OF TROPONIN QUANT: CPT | Performed by: EMERGENCY MEDICINE

## 2023-04-14 PROCEDURE — 63600175 PHARM REV CODE 636 W HCPCS

## 2023-04-14 PROCEDURE — 99285 PR EMERGENCY DEPT VISIT,LEVEL V: ICD-10-PCS | Mod: GC,,, | Performed by: EMERGENCY MEDICINE

## 2023-04-14 PROCEDURE — 83690 ASSAY OF LIPASE: CPT | Performed by: PHYSICIAN ASSISTANT

## 2023-04-14 PROCEDURE — 83880 ASSAY OF NATRIURETIC PEPTIDE: CPT | Performed by: EMERGENCY MEDICINE

## 2023-04-14 PROCEDURE — 93005 ELECTROCARDIOGRAM TRACING: CPT

## 2023-04-14 PROCEDURE — 87389 HIV-1 AG W/HIV-1&-2 AB AG IA: CPT | Performed by: PHYSICIAN ASSISTANT

## 2023-04-14 RX ORDER — LORAZEPAM 2 MG/ML
0.5 INJECTION INTRAMUSCULAR
Status: COMPLETED | OUTPATIENT
Start: 2023-04-14 | End: 2023-04-14

## 2023-04-14 RX ORDER — PANTOPRAZOLE SODIUM 40 MG/1
40 TABLET, DELAYED RELEASE ORAL DAILY
Qty: 30 TABLET | Refills: 1 | Status: SHIPPED | OUTPATIENT
Start: 2023-04-14 | End: 2023-06-13

## 2023-04-14 RX ORDER — DICYCLOMINE HYDROCHLORIDE 10 MG/1
10 CAPSULE ORAL
Qty: 120 CAPSULE | Refills: 0 | Status: SHIPPED | OUTPATIENT
Start: 2023-04-14 | End: 2023-05-14

## 2023-04-14 RX ORDER — METOCLOPRAMIDE HYDROCHLORIDE 5 MG/ML
5 INJECTION INTRAMUSCULAR; INTRAVENOUS
Status: COMPLETED | OUTPATIENT
Start: 2023-04-14 | End: 2023-04-14

## 2023-04-14 RX ADMIN — METOCLOPRAMIDE 5 MG: 5 INJECTION, SOLUTION INTRAMUSCULAR; INTRAVENOUS at 11:04

## 2023-04-14 RX ADMIN — LORAZEPAM 0.5 MG: 2 INJECTION INTRAMUSCULAR; INTRAVENOUS at 11:04

## 2023-04-14 RX ADMIN — IOHEXOL 75 ML: 350 INJECTION, SOLUTION INTRAVENOUS at 11:04

## 2023-04-14 RX ADMIN — SODIUM CHLORIDE, POTASSIUM CHLORIDE, SODIUM LACTATE AND CALCIUM CHLORIDE 1000 ML: 600; 310; 30; 20 INJECTION, SOLUTION INTRAVENOUS at 11:04

## 2023-04-14 NOTE — Clinical Note
"Essence Wolfie" Regina was seen and treated in our emergency department on 4/14/2023.  She may return to work on 04/16/2023.       If you have any questions or concerns, please don't hesitate to call.      Vanessa Cuevas MD"

## 2023-04-15 VITALS
OXYGEN SATURATION: 95 % | DIASTOLIC BLOOD PRESSURE: 94 MMHG | WEIGHT: 120 LBS | BODY MASS INDEX: 21.95 KG/M2 | TEMPERATURE: 98 F | HEART RATE: 71 BPM | RESPIRATION RATE: 27 BRPM | SYSTOLIC BLOOD PRESSURE: 163 MMHG

## 2023-04-15 LAB — LIPASE SERPL-CCNC: 12 U/L (ref 4–60)

## 2023-04-15 PROCEDURE — 25500020 PHARM REV CODE 255: Performed by: EMERGENCY MEDICINE

## 2023-04-15 PROCEDURE — 96361 HYDRATE IV INFUSION ADD-ON: CPT

## 2023-04-15 NOTE — DISCHARGE INSTRUCTIONS
Thank you for coming to our Emergency Department today. It is important to remember that some problems or medical conditions are difficult to diagnose and may not be found or addressed during your Emergency Department visit.     Be sure to follow up with your primary care doctor and review all labs/imaging/tests that were performed during your ER visit with them. Some labs/imaging/tests may be outside of the normal range, and require non-emergent follow-up and/or further investigation/treatment/procedures/testing to help diagnose/exclude/prevent complications or other potentially serious medical conditions that were not discussed or addressed during your ER visit.    If you do not have a primary care doctor, you may contact the one listed on your discharge paperwork or you may also call the [facility] Appointment Desk at [phone] to schedule an appointment and establish care with one. It is important to your health that you have a primary care doctor.    Please take all medications as directed. All medications may potentially have side-effects and it is impossible to predict which medications may give you side-effects or what side-effects (if any) they will give you.. If you feel that you are having a negative effect or side-effect of any medication you should immediately stop taking them and seek medical attention. If you feel that you are having a life-threatening reaction call 911.    Return to the ER with any questions/concerns, new/concerning symptoms, worsening or failure to improve.     Do not drive, swim, climb to height, take a bath, operate heavy machinery, drink alcohol or take potentially sedating medications, sign any legal documents or make any important decisions for 24 hours if you have received any pain medications, sedatives or mood altering drugs during your ER visit or within 24 hours of taking them if they have been prescribed to you.     You can find additional resources for Dentists,  hearing aids, durable medical equipment, low cost pharmacies and other resources at https://[facility].org    BELOW THIS LINE ONLY APPLIES IF YOU HAVE A COVID TEST PENDING OR IF YOU HAVE BEEN DIAGNOSED WITH COVID:  Please access [facility] to review the results of your test. Until the results of your COVID test return, you should isolate yourself so as not to potentially spread illness to others.   If your COVID test returns positive, you should isolate yourself so as not to spread illness to others. After five full days, if you are feeling better and you have not had fever for 24 hours, you can return to your typical daily activities, but you must wear a mask around others for an additional 5 days.   If your COVID test returns negative and you are either unvaccinated or more than six months out from your two-dose vaccine and are not yet boosted, you should still quarantine for 5 full days followed by strict mask use for an additional 5 full days.   If your COVID test returns negative and you have received your 2-dose initial vaccine as well as a booster, you should continue strict mask use for 10 full days after the exposure.  For all those exposed, best practice includes a test at day 5 after the exposure. This can be a home test or a test through one of the many testing centers throughout our community.   Masking is always advised to limit the spread of COVID. Cdc.gov is an excellent site to obtain the latest up to date recommendations regarding COVID and COVID testing.     CDC Testing and Quarantine Guidelines for patients with exposure to a known-positive COVID-19 person:  A close exposure is defined as anyone who has had an exposure (masked or unmasked) to a known COVID -19 positive person within 6 feet of someone for a cumulative total of 15 minutes or more over a 24-hour period.   Vaccinated and/or if you recently had a positive covid test within 90 days do NOT need to quarantine after contact with someone  who had COVID-19 unless you develop symptoms.   Fully vaccinated people who have not had a positive test within 90 days, should get tested 3-5 days after their exposure, even if they don't have symptoms and wear a mask indoors in public for 14 days following exposure or until their test result is negative.      Unvaccinated and/or NOT had a positive test within 90 days and meet close exposure  You are required by CDC guidelines to quarantine for at least 5 days from time of exposure followed by 5 days of strict masking. It is recommended, but not required to test after 5 days, unless you develop symptoms, in which case you should test at that time.  If you get tested after 5 days and your test is positive, your 5 day period of isolation starts the day of the positive test.    If your exposure does not meet the above definition, you can return to your normal daily activities to include social distancing, wearing a mask and frequent handwashing.      Here is a link to guidance from the CDC:  https://www.cdc.gov/media/releases/2021/s1227-isolation-quarantine-guidance.html      [facility] Testing Sites:  https://ldh.la.gov/page/3934      [facility] website with testing locations and guidance:  [email or web address]

## 2023-04-15 NOTE — PROVIDER PROGRESS NOTES - EMERGENCY DEPT.
Encounter Date: 4/14/2023    ED Physician Progress Notes         EKG - STEMI Decision  Initial Reading: No STEMI present.  Response: No Action Needed.

## 2023-04-15 NOTE — ED PROVIDER NOTES
"Encounter Date: 2023       History     Chief Complaint   Patient presents with    Chest Pain     Chest pain, abdominal pain, nausea and weakness. Feels annelieseky     67-year-old female with past medical history of diverticulitis and sigmoid colectomy.  Patient now presents with onset five-day history of generalized abdominal pain and nausea in addition to a 1 day history of palpitations, chest tightness today which she experienced while she felt very anxious. Patient reports that for the last 5 days she is been having worsening abdominal pain which is generalized and started on her left flank and is now localized to her right flank. Patient endorses anorexia and has not eaten much in the past few days and ate a "small potato today." Patient denies any vomiting, bowel habit changes, fevers, or weight loss.  Additionally patient denies any ongoing chest pain or palpitations.    Review of patient's allergies indicates:   Allergen Reactions    Codeine     Dilaudid [hydromorphone] Other (See Comments)     hallucinations    Hydrocodone Hives, Nausea And Vomiting and Other (See Comments)     hallucinations    Latex Itching    Tramadol     Zofran [ondansetron hcl (pf)]      Past Medical History:   Diagnosis Date    Diverticulitis     Edema     Spinal stenosis      Past Surgical History:   Procedure Laterality Date    APPENDECTOMY       SECTION, CLASSIC      NASAL RECONSTRUCTION      SIGMOIDECTOMY       Family History   Problem Relation Age of Onset    No Known Problems Mother     No Known Problems Father     Breast cancer Sister      Social History     Tobacco Use    Smoking status: Never    Smokeless tobacco: Never   Substance Use Topics    Alcohol use: Yes    Drug use: No     Review of Systems    Physical Exam     Initial Vitals [23 2226]   BP Pulse Resp Temp SpO2   (!) 162/90 86 20 98.2 °F (36.8 °C) 99 %      MAP       --         Physical Exam    Nursing note and vitals reviewed.    Gen: AxOx3, well " nourished, appears stated age, no pallor, no jaundice, appears well hydrated  Eye: EOMI, no scleral icterus, no periorbital edema or ecchymosis  Head: Normocephalic, atraumatic, no lesions, scalp appears normal  ENT: Neck supple, no stridor, no masses, no drooling or voice changes  CVS: All distal pulses intact with normal rate and rhythm, no JVD, normal S1/S2, no murmur  Pulm: Normal breath sounds, no wheezes, rales or rhonchi, no increased work of breathing  Abd:  Nondistended, soft, tender over epigastrium and left upper quadrant without rebound tenderness or guarding, no organomegaly, no CVAT  Ext: No edema, no lesions, rashes, or deformity  Neuro: GCS15, moving all extremities, gait intact, face grossly symmetric  Psych: normal affect, cooperative, well groomed, makes good eye contact.  Patient endorses anxiety.      ED Course   Procedures  Labs Reviewed   CBC W/ AUTO DIFFERENTIAL - Abnormal; Notable for the following components:       Result Value    MCH 32.9 (*)     Immature Granulocytes 0.9 (*)     Immature Grans (Abs) 0.05 (*)     All other components within normal limits    Narrative:     Release to patient->Immediate   COMPREHENSIVE METABOLIC PANEL - Abnormal; Notable for the following components:    Sodium 131 (*)     CO2 19 (*)     All other components within normal limits    Narrative:     Release to patient->Immediate   HIV 1 / 2 ANTIBODY    Narrative:     Release to patient->Immediate   HEPATITIS C ANTIBODY    Narrative:     Release to patient->Immediate   TROPONIN I    Narrative:     Release to patient->Immediate   B-TYPE NATRIURETIC PEPTIDE    Narrative:     Release to patient->Immediate   LIPASE   LIPASE    Narrative:     ADD ON LIPASE PER DR ROBERT BURCIAGA/ORDER# [id number] @ 00:21  Release to patient->Immediate     EKG Readings: (Independently Interpreted)   Initial Reading: No STEMI.   Rate 80 rhythm regular, axis normal, intervals normal, no ST depressions or elevations.  NSR     Imaging  Results              CT Abdomen Pelvis With Contrast (Final result)  Result time 04/15/23 00:13:08      Final result by Enoch Chan MD (04/15/23 00:13:08)                   Impression:      Stable CT of the abdomen and pelvis with pan colonic diverticulosis but no diverticulitis.    No acute finding within the abdomen or pelvis of inflammation mass or obstruction.      Electronically signed by: Enoch Chan  Date:    04/15/2023  Time:    00:13               Narrative:    EXAMINATION:  CT ABDOMEN PELVIS WITH CONTRAST    CLINICAL HISTORY:  Abdominal pain, acute, nonlocalized;    TECHNIQUE:  Low dose axial images, sagittal and coronal reformations were obtained from the lung bases to the pubic symphysis following the IV administration of 75 mL of Omnipaque 350 .  Oral contrast was not administered.    COMPARISON:  None.    FINDINGS:  Abdomen:    - Lower thorax:Base of the heart pericardium appear unremarkable and stable.    - Lung bases: No infiltrates and no nodules.    - Liver: No focal mass.    - Gallbladder: No calcified gallstones.    - Bile Ducts: No evidence of intra or extra hepatic biliary ductal dilation.    - Spleen: Negative.    - Kidneys: No mass or hydronephrosis.    - Adrenals: Unremarkable.    - Pancreas: No mass or peripancreatic fat stranding.    - Retroperitoneum:  No significant adenopathy.    - Vascular: No abdominal aortic aneurysm.    - Abdominal wall:  Unremarkable.    Pelvis:    No pelvic mass, adenopathy, or free fluid.    Bowel/Mesentery:    No evidence of bowel obstruction or inflammation.  Near pan colonic diverticulosis without features of diverticulitis or obstruction.    Bones:  No acute osseous abnormality and no suspicious lytic or blastic lesion.  Spondylosis throughout the spine with atypical endplate sclerosis at L1-L2.                                       X-Ray Chest AP Portable (Final result)  Result time 04/14/23 23:31:01      Final result by Jahaira Mccrary MD  (04/14/23 23:31:01)                   Impression:      No acute findings.      Electronically signed by: Jatin-Jose Mccrary  Date:    04/14/2023  Time:    23:31               Narrative:    EXAMINATION:  XR CHEST AP PORTABLE    CLINICAL HISTORY:  Chest Pain;    TECHNIQUE:  Single frontal view of the chest was performed.    COMPARISON:  02/26/2016    FINDINGS:  Lungs are clear.  No focal consolidation, pleural fluid, or pneumothorax.  Normal heart size.  Ossific densities projecting over the right shoulder, likely intra-articular bodies.                                       Medications   metoclopramide HCl injection 5 mg (5 mg Intravenous Given 4/14/23 2340)   lactated ringers bolus 1,000 mL (0 mLs Intravenous Stopped 4/15/23 0126)   LORazepam injection 0.5 mg (0.5 mg Intravenous Given 4/14/23 2343)   iohexoL (OMNIPAQUE 350) injection 75 mL (75 mLs Intravenous Given 4/14/23 2359)     Medical Decision Making:   Initial Assessment:   67-year-old female with 5 day history of abdominal pain and nausea and 1 episode of palpitations, chest tightness and anxiety. Patient is able to speak, breathing spontaneously, hemodynamically stable, oriented, moving all 4 limbs spontaneously.  Examination is consistent with tenderness to palpation of abdomen without guarding or rebound tenderness.    Differential Diagnosis:   Initial impression is concerning for pancreatitis, diverticulitis, gastritis, colitis.  Considered but doubt small bowel obstruction given history and physical examination.  Clinical Tests:   Lab Tests: Ordered and Reviewed  The following lab test(s) were unremarkable: BMP  Radiological Study: Ordered and Reviewed  Medical Tests: Ordered  ED Management:  Given patient's abdominal pain and nausea in setting of previous colectomy decided to obtain CT abdomen which not concerning for small bowel obstruction or intra-abdominal pathology including infection.  Patient's laboratory values including CBC, CMP, troponin,  BNP normal.  EKG was reassuring.  Decided to give metoclopramide and Ativan for nausea and anxiety.  Patient endorses her symptoms were much improved after this and she felt less anxious.  Given reassuring workup risk stratification for emergent condition is low.  Patient was discharged with return precautions and instructions to follow up with PCP.  Patient remained stable while in the emergency department.          Attending Attestation:   Physician Attestation Statement for Resident:  As the supervising MD   Physician Attestation Statement: I have personally seen and examined this patient.   I agree with the above history.  -:   As the supervising MD I agree with the above PE.     As the supervising MD I agree with the above treatment, course, plan, and disposition.    I have reviewed and agree with the residents interpretation of the following: lab data, x-rays, EKG and CT scans.  I have reviewed the following: old records at this facility.              ED Course as of 04/15/23 0547   Sat Apr 15, 2023   0007 Comprehensive metabolic panel(!)  Benign [PM]   0007 CBC auto differential(!)  Benign [PM]   0007 BNP: 42 [PM]   0007 Troponin I: <0.006 [PM]   0007 X-Ray Chest AP Portable  As per my interpretation, the chest x-ray is grossly normal with no acute findings concerning for new infiltrates, new edema, new effusions, changes in cardiac silhouette.    [PM]   0036 CT Abdomen Pelvis With Contrast  As per my interpretation there signs of diverticulosis without signs of diverticulitis.  No signs of small-bowel obstruction or intra-abdominal infection. [PM]      ED Course User Index  [PM] Nancy Napoles MD                 Clinical Impression:   Final diagnoses:  [R07.9] Chest pain  [R10.9] Abdominal pain, unspecified abdominal location (Primary)  [F41.9] Anxiety        ED Disposition Condition    Discharge Stable          ED Prescriptions    None       Follow-up Information       Follow up With Specialties Details  Why Contact Info    Noreen Stephenson MD Internal Medicine Schedule an appointment as soon as possible for a visit   [id number] [place] [id number]  [phone]      [facility] - Emergency Dept Emergency Medicine  As needed, If symptoms worsen [address]  [facility] [id number] [phone]             Nancy Napoles MD  Resident  04/15/23 0547       Vanessa Cuevas MD  04/15/23 0551

## 2023-04-15 NOTE — ED TRIAGE NOTES
Essence Gallardomuna Tapia, a 67 y.o. female presents to the ED w/ complaint of     Triage note:  Chief Complaint   Patient presents with    Chest Pain     Chest pain, abdominal pain, nausea and weakness. Feels shaky     Review of patient's allergies indicates:   Allergen Reactions    Codeine     Dilaudid [hydromorphone] Other (See Comments)     hallucinations    Hydrocodone Hives, Nausea And Vomiting and Other (See Comments)     hallucinations    Latex Itching    Tramadol     Zofran [ondansetron hcl (pf)]      Past Medical History:   Diagnosis Date    Diverticulitis     Edema     Spinal stenosis

## 2023-04-18 ENCOUNTER — LAB VISIT (OUTPATIENT)
Dept: LAB | Facility: HOSPITAL | Age: 68
End: 2023-04-18
Attending: INTERNAL MEDICINE
Payer: MEDICARE

## 2023-04-18 DIAGNOSIS — R53.83 FATIGUE, UNSPECIFIED TYPE: ICD-10-CM

## 2023-04-18 LAB
ALBUMIN SERPL BCP-MCNC: 4.6 G/DL (ref 3.5–5.2)
ALP SERPL-CCNC: 95 U/L (ref 55–135)
ALT SERPL W/O P-5'-P-CCNC: 25 U/L (ref 10–44)
ANION GAP SERPL CALC-SCNC: 13 MMOL/L (ref 8–16)
AST SERPL-CCNC: 29 U/L (ref 10–40)
BILIRUB SERPL-MCNC: 0.7 MG/DL (ref 0.1–1)
BUN SERPL-MCNC: 8 MG/DL (ref 8–23)
CALCIUM SERPL-MCNC: 9.9 MG/DL (ref 8.7–10.5)
CHLORIDE SERPL-SCNC: 97 MMOL/L (ref 95–110)
CO2 SERPL-SCNC: 20 MMOL/L (ref 23–29)
CREAT SERPL-MCNC: 0.6 MG/DL (ref 0.5–1.4)
ERYTHROCYTE [DISTWIDTH] IN BLOOD BY AUTOMATED COUNT: 11.9 % (ref 11.5–14.5)
EST. GFR  (NO RACE VARIABLE): >60 ML/MIN/1.73 M^2
GLUCOSE SERPL-MCNC: 118 MG/DL (ref 70–110)
HCT VFR BLD AUTO: 39.4 % (ref 37–48.5)
HGB BLD-MCNC: 13.4 G/DL (ref 12–16)
MAGNESIUM SERPL-MCNC: 1.7 MG/DL (ref 1.6–2.6)
MCH RBC QN AUTO: 33.4 PG (ref 27–31)
MCHC RBC AUTO-ENTMCNC: 34 G/DL (ref 32–36)
MCV RBC AUTO: 98 FL (ref 82–98)
PLATELET # BLD AUTO: 202 K/UL (ref 150–450)
PMV BLD AUTO: 10 FL (ref 9.2–12.9)
POTASSIUM SERPL-SCNC: 4.1 MMOL/L (ref 3.5–5.1)
PROT SERPL-MCNC: 7.9 G/DL (ref 6–8.4)
RBC # BLD AUTO: 4.01 M/UL (ref 4–5.4)
SODIUM SERPL-SCNC: 130 MMOL/L (ref 136–145)
TSH SERPL DL<=0.005 MIU/L-ACNC: 1.59 UIU/ML (ref 0.4–4)
WBC # BLD AUTO: 4.25 K/UL (ref 3.9–12.7)

## 2023-04-18 PROCEDURE — 85027 COMPLETE CBC AUTOMATED: CPT | Performed by: INTERNAL MEDICINE

## 2023-04-18 PROCEDURE — 83735 ASSAY OF MAGNESIUM: CPT | Performed by: INTERNAL MEDICINE

## 2023-04-18 PROCEDURE — 84443 ASSAY THYROID STIM HORMONE: CPT | Performed by: INTERNAL MEDICINE

## 2023-04-18 PROCEDURE — 80053 COMPREHEN METABOLIC PANEL: CPT | Performed by: INTERNAL MEDICINE

## 2023-04-18 PROCEDURE — 36415 COLL VENOUS BLD VENIPUNCTURE: CPT | Performed by: INTERNAL MEDICINE

## 2023-05-05 ENCOUNTER — TELEPHONE (OUTPATIENT)
Dept: PRIMARY CARE CLINIC | Facility: CLINIC | Age: 68
End: 2023-05-05
Payer: MEDICARE

## 2023-05-05 NOTE — TELEPHONE ENCOUNTER
----- Message from Galilea Graham sent at 5/5/2023  9:10 AM CDT -----  Regarding: Appt  Contact: [phone]  Patient is calling to schedule labs that done on 4/18 doctor said she wanted to check them again. Please contact pt

## 2023-05-08 ENCOUNTER — LAB VISIT (OUTPATIENT)
Dept: LAB | Facility: HOSPITAL | Age: 68
End: 2023-05-08
Attending: INTERNAL MEDICINE
Payer: MEDICARE

## 2023-05-08 DIAGNOSIS — E87.6 HYPOKALEMIA: ICD-10-CM

## 2023-05-08 LAB
ANION GAP SERPL CALC-SCNC: 10 MMOL/L (ref 8–16)
BUN SERPL-MCNC: 10 MG/DL (ref 8–23)
CALCIUM SERPL-MCNC: 10 MG/DL (ref 8.7–10.5)
CHLORIDE SERPL-SCNC: 98 MMOL/L (ref 95–110)
CO2 SERPL-SCNC: 27 MMOL/L (ref 23–29)
CREAT SERPL-MCNC: 0.8 MG/DL (ref 0.5–1.4)
EST. GFR  (NO RACE VARIABLE): >60 ML/MIN/1.73 M^2
GLUCOSE SERPL-MCNC: 91 MG/DL (ref 70–110)
POTASSIUM SERPL-SCNC: 5 MMOL/L (ref 3.5–5.1)
SODIUM SERPL-SCNC: 135 MMOL/L (ref 136–145)

## 2023-05-08 PROCEDURE — 80048 BASIC METABOLIC PNL TOTAL CA: CPT | Performed by: INTERNAL MEDICINE

## 2023-05-08 PROCEDURE — 36415 COLL VENOUS BLD VENIPUNCTURE: CPT | Performed by: INTERNAL MEDICINE

## 2023-05-25 ENCOUNTER — PATIENT MESSAGE (OUTPATIENT)
Dept: PRIMARY CARE CLINIC | Facility: CLINIC | Age: 68
End: 2023-05-25
Payer: MEDICARE

## 2023-05-25 DIAGNOSIS — F51.05 HYPOSOMNIA, INSOMNIA OR SLEEPLESSNESS ASSOCIATED WITH ANXIETY: ICD-10-CM

## 2023-05-25 DIAGNOSIS — F41.9 HYPOSOMNIA, INSOMNIA OR SLEEPLESSNESS ASSOCIATED WITH ANXIETY: ICD-10-CM

## 2023-05-25 RX ORDER — TEMAZEPAM 15 MG/1
CAPSULE ORAL
Qty: 60 CAPSULE | Refills: 5 | Status: SHIPPED | OUTPATIENT
Start: 2023-05-25 | End: 2023-11-06

## 2023-05-25 NOTE — TELEPHONE ENCOUNTER
----- Message from Nichole Alon sent at 5/25/2023 10:13 AM CDT -----  Contact: Patient  Type:  Patient Call          Who Called: patient         Does the patient know what this is regarding?: Requesting a call back to have a refill on Rx   temazepam (RESTORIL) 15 mg Cap ; pt said that she called on yesterday and never received a call back and she need her medication for tonight also she had the medication filled while she was out of town and that's what's causing a problem at the pharmacy ; please advise         Would the patient rather a call back or a response via [facility]? Call           Best Call Back Number:[phone]             Additional Information:  [facility]/pharmacy #[id number] - [place] - [address]  [address] [place] [address]  Phone: [phone] Fax: [phone]

## 2023-06-13 ENCOUNTER — LAB VISIT (OUTPATIENT)
Dept: LAB | Facility: HOSPITAL | Age: 68
End: 2023-06-13
Attending: INTERNAL MEDICINE
Payer: MEDICARE

## 2023-06-13 ENCOUNTER — OFFICE VISIT (OUTPATIENT)
Dept: PRIMARY CARE CLINIC | Facility: CLINIC | Age: 68
End: 2023-06-13
Payer: MEDICARE

## 2023-06-13 VITALS
OXYGEN SATURATION: 99 % | BODY MASS INDEX: 22.28 KG/M2 | HEART RATE: 85 BPM | HEIGHT: 62 IN | DIASTOLIC BLOOD PRESSURE: 78 MMHG | WEIGHT: 121.06 LBS | SYSTOLIC BLOOD PRESSURE: 134 MMHG

## 2023-06-13 DIAGNOSIS — Z78.0 MENOPAUSE: ICD-10-CM

## 2023-06-13 DIAGNOSIS — Z00.00 WELLNESS EXAMINATION: Primary | ICD-10-CM

## 2023-06-13 DIAGNOSIS — R73.09 ELEVATED GLUCOSE: ICD-10-CM

## 2023-06-13 DIAGNOSIS — F51.05 HYPOSOMNIA, INSOMNIA OR SLEEPLESSNESS ASSOCIATED WITH ANXIETY: ICD-10-CM

## 2023-06-13 DIAGNOSIS — E03.9 HYPOTHYROIDISM, UNSPECIFIED TYPE: ICD-10-CM

## 2023-06-13 DIAGNOSIS — E78.5 DYSLIPIDEMIA: ICD-10-CM

## 2023-06-13 DIAGNOSIS — Z12.31 ENCOUNTER FOR SCREENING MAMMOGRAM FOR MALIGNANT NEOPLASM OF BREAST: ICD-10-CM

## 2023-06-13 DIAGNOSIS — F41.9 HYPOSOMNIA, INSOMNIA OR SLEEPLESSNESS ASSOCIATED WITH ANXIETY: ICD-10-CM

## 2023-06-13 LAB
CHOLEST SERPL-MCNC: 296 MG/DL (ref 120–199)
CHOLEST/HDLC SERPL: 4 {RATIO} (ref 2–5)
ESTIMATED AVG GLUCOSE: 82 MG/DL (ref 68–131)
HBA1C MFR BLD: 4.5 % (ref 4–5.6)
HDLC SERPL-MCNC: 74 MG/DL (ref 40–75)
HDLC SERPL: 25 % (ref 20–50)
LDLC SERPL CALC-MCNC: 179 MG/DL (ref 63–159)
NONHDLC SERPL-MCNC: 222 MG/DL
TRIGL SERPL-MCNC: 215 MG/DL (ref 30–150)
TSH SERPL DL<=0.005 MIU/L-ACNC: 1.08 UIU/ML (ref 0.4–4)

## 2023-06-13 PROCEDURE — 3008F BODY MASS INDEX DOCD: CPT | Mod: CPTII,S$GLB,, | Performed by: INTERNAL MEDICINE

## 2023-06-13 PROCEDURE — 3075F PR MOST RECENT SYSTOLIC BLOOD PRESS GE 130-139MM HG: ICD-10-PCS | Mod: CPTII,S$GLB,, | Performed by: INTERNAL MEDICINE

## 2023-06-13 PROCEDURE — 3008F PR BODY MASS INDEX (BMI) DOCUMENTED: ICD-10-PCS | Mod: CPTII,S$GLB,, | Performed by: INTERNAL MEDICINE

## 2023-06-13 PROCEDURE — 1159F PR MEDICATION LIST DOCUMENTED IN MEDICAL RECORD: ICD-10-PCS | Mod: CPTII,S$GLB,, | Performed by: INTERNAL MEDICINE

## 2023-06-13 PROCEDURE — 84443 ASSAY THYROID STIM HORMONE: CPT | Performed by: INTERNAL MEDICINE

## 2023-06-13 PROCEDURE — 80061 LIPID PANEL: CPT | Performed by: INTERNAL MEDICINE

## 2023-06-13 PROCEDURE — 99397 PR PREVENTIVE VISIT,EST,65 & OVER: ICD-10-PCS | Mod: GZ,S$GLB,, | Performed by: INTERNAL MEDICINE

## 2023-06-13 PROCEDURE — 83036 HEMOGLOBIN GLYCOSYLATED A1C: CPT | Performed by: INTERNAL MEDICINE

## 2023-06-13 PROCEDURE — 4010F PR ACE/ARB THEARPY RXD/TAKEN: ICD-10-PCS | Mod: CPTII,S$GLB,, | Performed by: INTERNAL MEDICINE

## 2023-06-13 PROCEDURE — 3078F DIAST BP <80 MM HG: CPT | Mod: CPTII,S$GLB,, | Performed by: INTERNAL MEDICINE

## 2023-06-13 PROCEDURE — 3075F SYST BP GE 130 - 139MM HG: CPT | Mod: CPTII,S$GLB,, | Performed by: INTERNAL MEDICINE

## 2023-06-13 PROCEDURE — 99397 PER PM REEVAL EST PAT 65+ YR: CPT | Mod: GZ,S$GLB,, | Performed by: INTERNAL MEDICINE

## 2023-06-13 PROCEDURE — 99999 PR PBB SHADOW E&M-EST. PATIENT-LVL III: CPT | Mod: PBBFAC,,, | Performed by: INTERNAL MEDICINE

## 2023-06-13 PROCEDURE — 99999 PR PBB SHADOW E&M-EST. PATIENT-LVL III: ICD-10-PCS | Mod: PBBFAC,,, | Performed by: INTERNAL MEDICINE

## 2023-06-13 PROCEDURE — 1159F MED LIST DOCD IN RCRD: CPT | Mod: CPTII,S$GLB,, | Performed by: INTERNAL MEDICINE

## 2023-06-13 PROCEDURE — 3078F PR MOST RECENT DIASTOLIC BLOOD PRESSURE < 80 MM HG: ICD-10-PCS | Mod: CPTII,S$GLB,, | Performed by: INTERNAL MEDICINE

## 2023-06-13 PROCEDURE — 4010F ACE/ARB THERAPY RXD/TAKEN: CPT | Mod: CPTII,S$GLB,, | Performed by: INTERNAL MEDICINE

## 2023-06-13 PROCEDURE — 36415 COLL VENOUS BLD VENIPUNCTURE: CPT | Performed by: INTERNAL MEDICINE

## 2023-06-13 NOTE — PROGRESS NOTES
[facility] Note    Chief Complaint      Chief Complaint   Patient presents with    Annual Exam    Post-op Problem     History of Present Illness      Essence Tapia is a 67 y.o. female who presents today for chief complaint annual wellness . Patient previously seen by me    PCP: Noreen Stephenson MD  Patient comes to appointment alone.     HPI   She is s/p breast reduction and liposuction   We did labs in April everything was normal, she was mildly hyponatremic but we repeated labs and Na was normal  She is going [employer] and doing cardio, she is concerned about   She lost her job at [employer] - she is not working, she is on PHN, she is looking for a job   She saw dr thor johnson   Bp is good  No issues with diverticulitis   She is on temazepam 30 qhs which is working  Feeling depressed, declines ssri   She is drinking daily red wine a mottle a day  She denies SI  Says going to the gym helps, says losing weight would help   Active Problem List with Overview Notes    Diagnosis Date Noted    Diverticular disease 2020     Sees [facility], had complicated admission in 2018 for diverticulitis with pseudomonal bacteremia         History of pancreatitis 2020    Hypertension 2020    Hyposomnia, insomnia or sleeplessness associated with anxiety 2020     Using prn temazepam, sometimes 15 and sometimes 30, taking drug holidays       Hypothyroid 2020    Screening for colon cancer 2020     Last scope dr osorio kay 2010         Health Maintenance   Topic Date Due    DEXA Scan  2022    Mammogram  2023    Lipid Panel  03/10/2027    TETANUS VACCINE  03/10/2032    Hepatitis C Screening  Completed       Past Medical History:   Diagnosis Date    Diverticulitis     Edema     Spinal stenosis        Past Surgical History:   Procedure Laterality Date    APPENDECTOMY      BREAST SURGERY  2023     SECTION, CLASSIC      LIPOSUCTION   01/05/2023    NASAL RECONSTRUCTION      SIGMOIDECTOMY         family history includes Breast cancer in her sister; No Known Problems in her father and mother.    Social History     Tobacco Use    Smoking status: Never     Passive exposure: Never    Smokeless tobacco: Never   Substance Use Topics    Alcohol use: Yes    Drug use: No       Review of Systems   Constitutional:  Negative for chills, fever, malaise/fatigue and weight loss.   Respiratory:  Negative for cough, sputum production, shortness of breath and wheezing.    Cardiovascular:  Negative for chest pain, palpitations, orthopnea and leg swelling.   Gastrointestinal:  Negative for constipation, diarrhea, nausea and vomiting.   Genitourinary:  Negative for dysuria, frequency, hematuria and urgency.      Outpatient Encounter Medications as of 6/13/2023   Medication Sig Note Dispense Refill    acyclovir (ZOVIRAX) 400 MG tablet Take 400 mg by mouth once daily. 2/23/2021: As needed      levothyroxine (SYNTHROID) 75 MCG tablet TAKE 1 TABLET BY MOUTH EVERY DAY  90 tablet 0    lisinopriL-hydrochlorothiazide (PRINZIDE,ZESTORETIC) 10-12.5 mg per tablet TAKE 1 TABLET BY MOUTH EVERY DAY  90 tablet 1    temazepam (RESTORIL) 15 mg Cap TAKE 1 TO 2 CAPSULES BY MOUTH NIGHTLY AS NEEDED  60 capsule 5    [DISCONTINUED] moxifloxacin (AVELOX) 400 mg tablet Take 1 tablet (400 mg total) by mouth once daily.  10 tablet 0    [DISCONTINUED] pantoprazole (PROTONIX) 40 MG tablet Take 1 tablet (40 mg total) by mouth once daily. (Patient not taking: Reported on 6/13/2023)  30 tablet 1    [DISCONTINUED] temazepam (RESTORIL) 15 mg Cap TAKE 1 TO 2 CAPSULES BY MOUTH NIGHTLY AS NEEDED  60 capsule 5    [DISCONTINUED] triamterene-hydrochlorothiazide 37.5-25 mg (DYAZIDE) 37.5-25 mg per capsule Take 1 capsule by mouth every morning. (Patient not taking: Reported on 6/13/2023)  90 capsule 1     No facility-administered encounter medications on file as of 6/13/2023.        Review of patient's  "allergies indicates:   Allergen Reactions    Codeine     Dilaudid [hydromorphone] Other (See Comments)     hallucinations    Hydrocodone Hives, Nausea And Vomiting and Other (See Comments)     hallucinations    Latex Itching    Tramadol     Zofran [ondansetron hcl (pf)]            Physical Exam      Vital Signs  Pulse: 85  SpO2: 99 %  BP: 134/78  BP Location: Left arm  Patient Position: Sitting  Height and Weight  Height: 5' 2" (157.5 cm)  Weight: 54.9 kg (121 lb 0.5 oz)  BSA (Calculated - sq m): 1.55 sq meters  BMI (Calculated): 22.1  Weight in (lb) to have BMI = 25: 136.4]    Physical Exam  Vitals reviewed.   Constitutional:       General: She is not in acute distress.     Appearance: She is well-developed. She is not diaphoretic.   HENT:      Head: Normocephalic and atraumatic.      Right Ear: External ear normal.      Left Ear: External ear normal.      Nose: Nose normal.   Eyes:      General:         Right eye: No discharge.         Left eye: No discharge.      Conjunctiva/sclera: Conjunctivae normal.   Cardiovascular:      Rate and Rhythm: Normal rate and regular rhythm.      Heart sounds: Normal heart sounds.   Pulmonary:      Effort: Pulmonary effort is normal. No respiratory distress.      Breath sounds: Normal breath sounds.   Musculoskeletal:         General: Normal range of motion.      Cervical back: Normal range of motion.   Skin:     Coloration: Skin is not pale.      Findings: No rash.   Neurological:      Mental Status: She is alert and oriented to person, place, and time.   Psychiatric:         Behavior: Behavior normal.         Thought Content: Thought content normal.        Laboratory:  CBC:  Recent Labs   Lab Result Units 04/14/23 2249 04/18/23  1005   WBC K/uL 5.51 4.25   RBC M/uL 4.10 4.01   Hemoglobin g/dL 13.5 13.4   Hematocrit % 38.8 39.4   Platelets K/uL 234 202   MCV fL 95 98   MCH pg 32.9* 33.4*   MCHC g/dL 34.8 34.0     CMP:  Recent Labs   Lab Result Units 04/14/23 2249 04/18/23  1005 " 05/08/23  1001   Glucose mg/dL 108 118* 91   Calcium mg/dL 9.8 9.9 10.0   Albumin g/dL 4.5 4.6  --    Total Protein g/dL 7.9 7.9  --    Sodium mmol/L 131* 130* 135*   Potassium mmol/L 3.8 4.1 5.0   CO2 mmol/L 19* 20* 27   Chloride mmol/L 96 97 98   BUN mg/dL 8 8 10   Alkaline Phosphatase U/L 95 95  --    ALT U/L 26 25  --    AST U/L 25 29  --    Total Bilirubin mg/dL 0.5 0.7  --      URINALYSIS:  No results for input(s): COLORU, CLARITYU, SPECGRAV, PHUR, PROTEINUA, GLUCOSEU, BILIRUBINCON, BLOODU, WBCU, RBCU, BACTERIA, MUCUS, NITRITE, LEUKOCYTESUR, UROBILINOGEN, HYALINECASTS in the last 2160 hours.   LIPIDS:  Recent Labs   Lab Result Units 04/18/23  1005   TSH uIU/mL 1.587     TSH:  Recent Labs   Lab Result Units 04/18/23  1005   TSH uIU/mL 1.587     A1C:  No results for input(s): HGBA1C in the last 2160 hours.    Radiology:      Assessment/Plan     Essence Tapia is a 67 y.o.female with:    1. Wellness examination    2. Menopause  -     DXA Bone Density Axial Skeleton 1 or more sites; Future; Expected date: 06/13/2023    3. Encounter for screening mammogram for malignant neoplasm of breast  -     Mammo Digital Screening Bilat w/ Wilner; Future; Expected date: 06/13/2023    4. Hypothyroidism, unspecified type  -     TSH; Future; Expected date: 06/13/2023    5. Elevated glucose  -     Hemoglobin A1C; Future; Expected date: 06/13/2023    6. Dyslipidemia  -     Lipid Panel; Future; Expected date: 06/13/2023    7. Hyposomnia, insomnia or sleeplessness associated with anxiety  Overview:  Using prn temazepam, sometimes 15 and sometimes 30, taking drug holidays        Use of the [facility] Patient Portal discussed and encouraged during today's visit  -Continue current medications and maintain follow up with specialists.  Return to clinic in 6 months.  No future appointments.    Noreen Stephenson MD  6/13/2023 10:20 AM    Primary Care Internal Medicine

## 2023-06-14 ENCOUNTER — TELEPHONE (OUTPATIENT)
Dept: PRIMARY CARE CLINIC | Facility: CLINIC | Age: 68
End: 2023-06-14
Payer: MEDICARE

## 2023-06-14 NOTE — TELEPHONE ENCOUNTER
----- Message from Kaylie Shore sent at 6/14/2023 10:15 AM CDT -----  Contact: pt [phone]  Patient stated that they missed a call from this office.    Please call and advise.    Thank You

## 2023-06-19 ENCOUNTER — PATIENT MESSAGE (OUTPATIENT)
Dept: PRIMARY CARE CLINIC | Facility: CLINIC | Age: 68
End: 2023-06-19
Payer: MEDICARE

## 2023-06-20 ENCOUNTER — PATIENT MESSAGE (OUTPATIENT)
Dept: PRIMARY CARE CLINIC | Facility: CLINIC | Age: 68
End: 2023-06-20
Payer: MEDICARE

## 2023-06-20 RX ORDER — ROSUVASTATIN CALCIUM 5 MG/1
5 TABLET, COATED ORAL DAILY
Qty: 90 TABLET | Refills: 3 | Status: SHIPPED | OUTPATIENT
Start: 2023-06-20 | End: 2024-06-19

## 2023-06-30 ENCOUNTER — PATIENT MESSAGE (OUTPATIENT)
Dept: PRIMARY CARE CLINIC | Facility: CLINIC | Age: 68
End: 2023-06-30
Payer: MEDICARE

## 2023-06-30 RX ORDER — PROMETHAZINE HYDROCHLORIDE AND DEXTROMETHORPHAN HYDROBROMIDE 6.25; 15 MG/5ML; MG/5ML
5 SYRUP ORAL EVERY 4 HOURS PRN
Qty: 60 ML | Refills: 0 | Status: SHIPPED | OUTPATIENT
Start: 2023-06-30 | End: 2023-10-03

## 2023-07-19 ENCOUNTER — TELEPHONE (OUTPATIENT)
Dept: PRIMARY CARE CLINIC | Facility: CLINIC | Age: 68
End: 2023-07-19
Payer: MEDICARE

## 2023-07-19 NOTE — TELEPHONE ENCOUNTER
----- Message from Keisha Baumann sent at 7/19/2023 12:24 PM CDT -----  Type:  Patient Returning Call    Who Called: pt   Who Left Message for Patient: pt  Does the patient know what this is regarding?: pt need a call she think she is having a pancreatic attack   Would the patient rather a call back or a response via [facility]?  Call  Best Call Back Number:[phone]  Additional Information:  call back

## 2023-08-20 ENCOUNTER — PATIENT MESSAGE (OUTPATIENT)
Dept: PRIMARY CARE CLINIC | Facility: CLINIC | Age: 68
End: 2023-08-20
Payer: MEDICARE

## 2023-08-21 ENCOUNTER — PATIENT MESSAGE (OUTPATIENT)
Dept: PRIMARY CARE CLINIC | Facility: CLINIC | Age: 68
End: 2023-08-21
Payer: MEDICARE

## 2023-08-21 ENCOUNTER — TELEPHONE (OUTPATIENT)
Dept: PRIMARY CARE CLINIC | Facility: CLINIC | Age: 68
End: 2023-08-21
Payer: MEDICARE

## 2023-08-21 NOTE — TELEPHONE ENCOUNTER
----- Message from Chanda Blackman sent at 8/21/2023 10:34 AM CDT -----  Regarding: Medication Refill  Contact: Patient  Type:  RX Refill Request    Who Called:  Patient   Refill or New Rx: New   RX Name and Strength: phenergan 25 mg tablet   How is the patient currently taking it? (ex. 1XDay):   Is this a 30 day or 90 day RX: 30 day   Preferred Pharmacy with phone number: [facility]/pharmacy #[id number] - [place] - [address]   Phone:  [phone]  Local or Mail Order: Local   Ordering Provider: Seema   Would the patient rather a call back or a response via [facility]? Call back   Best Call Back Number:[phone]  Additional Information: Patient is requesting medication for nausea Please Assist

## 2023-08-21 NOTE — TELEPHONE ENCOUNTER
----- Message from Destinee Fontana sent at 8/21/2023  3:31 PM CDT -----  Regarding: pt called  Name of Who is Calling: FRANCIS MOSER [[id number]]      What is the request in detail: pt is requesting medication be sent over for stomach virus. promethazine (PHENERGAN) 25 MG tablet      [facility]/pharmacy #[id number] - [place] - [address]  [address]  [place] [address]  Phone: [phone] Fax: [phone]          Can the clinic reply by [facility]: No        What Number to Call Back if not in [facility]: [phone]

## 2023-08-22 RX ORDER — PROMETHAZINE HYDROCHLORIDE 25 MG/1
25 TABLET ORAL EVERY 6 HOURS PRN
Qty: 30 TABLET | Refills: 0 | Status: SHIPPED | OUTPATIENT
Start: 2023-08-22 | End: 2023-09-29

## 2023-08-22 NOTE — TELEPHONE ENCOUNTER
----- Message from Chanda Blackman sent at 8/22/2023 10:32 AM CDT -----  Regarding: Medical Assistance  Contact: Patient  Patient is requesting an update on medication request sent to office   Patient would like to know if physician will send prescription   Please Assist    Patient can be reached at [phone]

## 2023-08-23 ENCOUNTER — PATIENT MESSAGE (OUTPATIENT)
Dept: PRIMARY CARE CLINIC | Facility: CLINIC | Age: 68
End: 2023-08-23
Payer: MEDICARE

## 2023-09-07 ENCOUNTER — PATIENT MESSAGE (OUTPATIENT)
Dept: PRIMARY CARE CLINIC | Facility: CLINIC | Age: 68
End: 2023-09-07
Payer: MEDICARE

## 2023-09-07 DIAGNOSIS — E03.9 HYPOTHYROIDISM, UNSPECIFIED TYPE: Primary | ICD-10-CM

## 2023-09-08 DIAGNOSIS — E03.9 HYPOTHYROIDISM, UNSPECIFIED TYPE: ICD-10-CM

## 2023-09-08 RX ORDER — LEVOTHYROXINE SODIUM 88 UG/1
88 TABLET ORAL DAILY
Qty: 90 TABLET | Refills: 1 | Status: SHIPPED | OUTPATIENT
Start: 2023-09-08 | End: 2024-03-11

## 2023-09-11 ENCOUNTER — PATIENT MESSAGE (OUTPATIENT)
Dept: PRIMARY CARE CLINIC | Facility: CLINIC | Age: 68
End: 2023-09-11
Payer: MEDICARE

## 2023-09-11 ENCOUNTER — HOSPITAL ENCOUNTER (OUTPATIENT)
Dept: RADIOLOGY | Facility: HOSPITAL | Age: 68
Discharge: HOME | End: 2023-09-11
Attending: INTERNAL MEDICINE
Payer: MEDICARE

## 2023-09-11 DIAGNOSIS — Z78.0 MENOPAUSE: ICD-10-CM

## 2023-09-21 ENCOUNTER — PATIENT MESSAGE (OUTPATIENT)
Dept: PRIMARY CARE CLINIC | Facility: CLINIC | Age: 68
End: 2023-09-21
Payer: MEDICARE

## 2023-09-29 ENCOUNTER — NURSE TRIAGE (OUTPATIENT)
Dept: ADMINISTRATIVE | Facility: CLINIC | Age: 68
End: 2023-09-29
Payer: MEDICARE

## 2023-09-29 ENCOUNTER — TELEPHONE (OUTPATIENT)
Dept: PRIMARY CARE CLINIC | Facility: CLINIC | Age: 68
End: 2023-09-29
Payer: MEDICARE

## 2023-09-29 RX ORDER — PROMETHAZINE HYDROCHLORIDE 6.25 MG/5ML
25 SYRUP ORAL EVERY 8 HOURS PRN
Qty: 400 ML | Refills: 0 | Status: SHIPPED | OUTPATIENT
Start: 2023-09-29 | End: 2023-10-04 | Stop reason: SDUPTHER

## 2023-09-29 NOTE — TELEPHONE ENCOUNTER
Pt reports hx of stomach issues like diverticulitis, gastritis, pancreatitis. Was recently placed on Ozempic, but stopped taking it due to horrible side effects. But having horrible stomach pain at times (current pain level is a 5 on 0-10 pain scale) and abdomen is distended now as well. Pt advised to be seen today per protocol via ED/UC/PCP office with MD approval, Pt refuses to go to an ED/UC states she just wants to have a blood test ordered to  rule out a pancreatitis attack and to have some liquid phenergan prescribed to help with her nausea until she can make her already scheduled appointment with Dr. Stephenson in October. Pt encouraged to call back with any worsening symptoms or questions and verbalized understanding.    Reason for Disposition   MILD TO MODERATE constant pain lasting > 2 hours    Additional Information   Negative: Passed out (i.e., fainted, collapsed and was not responding)   Negative: Shock suspected (e.g., cold/pale/clammy skin, too weak to stand, low BP, rapid pulse)   Negative: Sounds like a life-threatening emergency to the triager   Negative: SEVERE abdominal pain (e.g., excruciating)   Negative: Vomiting red blood or black (coffee ground) material   Negative: Blood in bowel movements  (Exception: Blood on surface of BM with constipation.)   Negative: Black or tarry bowel movements  (Exception: Chronic-unchanged black-grey BMs AND is taking iron pills or Pepto-Bismol.)    Protocols used: Abdominal Pain - Female-A-[place]

## 2023-09-29 NOTE — TELEPHONE ENCOUNTER
----- Message from Leatha Philippe sent at 9/29/2023 12:59 PM CDT -----    Patient Returning Call        Who Called:pt  Does the patient know what this is regarding?:please call pt having stomach pain  please call possible problem from using ozempic   Would the patient rather a call back or a response via [facility]? call  Best Call Back Number:  Additional Information: call back

## 2023-09-29 NOTE — TELEPHONE ENCOUNTER
"Spoke to pt. Advised pt to also go to ED. Pt states "I'm not doing that" and continued to state she just wants somebody to send in liquid phenergan to help with the nausea. Pt states she would rather "stay in bed in pain" instead of going to ED. Pt states last time she went to ED she sat there for 4 hours and all they gave her was Zofran. Pt states her abdomin has been extended for 2 weeks now. Pt requested blood test to test for pancreatitis. I advised pt I would send message to Dr. Stephenson. I did explain to pt that Dr. Stephenson is not in on Fridays. Please advise.     JORDAN Butt  "

## 2023-10-01 ENCOUNTER — PATIENT MESSAGE (OUTPATIENT)
Dept: PRIMARY CARE CLINIC | Facility: CLINIC | Age: 68
End: 2023-10-01
Payer: MEDICARE

## 2023-10-01 DIAGNOSIS — R11.0 NAUSEA: Primary | ICD-10-CM

## 2023-10-03 RX ORDER — PROMETHAZINE HYDROCHLORIDE AND DEXTROMETHORPHAN HYDROBROMIDE 6.25; 15 MG/5ML; MG/5ML
5 SYRUP ORAL EVERY 4 HOURS PRN
Qty: 60 ML | Refills: 0 | Status: ON HOLD | OUTPATIENT
Start: 2023-10-03 | End: 2023-10-10 | Stop reason: SDUPTHER

## 2023-10-04 RX ORDER — PROMETHAZINE HYDROCHLORIDE 6.25 MG/5ML
25 SYRUP ORAL EVERY 8 HOURS PRN
Qty: 400 ML | Refills: 0 | Status: SHIPPED | OUTPATIENT
Start: 2023-10-04 | End: 2023-10-09 | Stop reason: CLARIF

## 2023-10-09 ENCOUNTER — TELEPHONE (OUTPATIENT)
Dept: PRIMARY CARE CLINIC | Facility: CLINIC | Age: 68
End: 2023-10-09
Payer: MEDICARE

## 2023-10-09 ENCOUNTER — HOSPITAL ENCOUNTER (OUTPATIENT)
Facility: HOSPITAL | Age: 68
Discharge: HOME | End: 2023-10-10
Attending: EMERGENCY MEDICINE | Admitting: HOSPITALIST
Payer: MEDICARE

## 2023-10-09 DIAGNOSIS — K85.30 DRUG-INDUCED ACUTE PANCREATITIS, UNSPECIFIED COMPLICATION STATUS: Primary | ICD-10-CM

## 2023-10-09 DIAGNOSIS — R10.9 ABDOMINAL PAIN: ICD-10-CM

## 2023-10-09 DIAGNOSIS — R10.9 ABDOMINAL PAIN, UNSPECIFIED ABDOMINAL LOCATION: ICD-10-CM

## 2023-10-09 DIAGNOSIS — R07.9 CHEST PAIN: ICD-10-CM

## 2023-10-09 PROBLEM — K85.90 ACUTE PANCREATITIS: Status: RESOLVED | Noted: 2023-10-09 | Resolved: 2023-10-09

## 2023-10-09 PROBLEM — K85.90 ACUTE PANCREATITIS: Status: ACTIVE | Noted: 2023-10-09

## 2023-10-09 PROBLEM — F10.10 ALCOHOL ABUSE: Status: ACTIVE | Noted: 2023-10-09

## 2023-10-09 PROBLEM — F41.9 ANXIETY: Status: ACTIVE | Noted: 2023-10-09

## 2023-10-09 LAB
ALBUMIN SERPL BCP-MCNC: 4.2 G/DL (ref 3.5–5.2)
ALP SERPL-CCNC: 87 U/L (ref 55–135)
ALT SERPL W/O P-5'-P-CCNC: 47 U/L (ref 10–44)
ANION GAP SERPL CALC-SCNC: 9 MMOL/L (ref 8–16)
AST SERPL-CCNC: 40 U/L (ref 10–40)
BASOPHILS # BLD AUTO: 0.03 K/UL (ref 0–0.2)
BASOPHILS NFR BLD: 0.6 % (ref 0–1.9)
BILIRUB SERPL-MCNC: 0.4 MG/DL (ref 0.1–1)
BILIRUB UR QL STRIP: NEGATIVE
BUN SERPL-MCNC: 10 MG/DL (ref 6–30)
BUN SERPL-MCNC: 11 MG/DL (ref 8–23)
CALCIUM SERPL-MCNC: 9.3 MG/DL (ref 8.7–10.5)
CHLORIDE SERPL-SCNC: 102 MMOL/L (ref 95–110)
CHLORIDE SERPL-SCNC: 99 MMOL/L (ref 95–110)
CLARITY UR REFRACT.AUTO: CLEAR
CO2 SERPL-SCNC: 24 MMOL/L (ref 23–29)
COLOR UR AUTO: COLORLESS
CREAT SERPL-MCNC: 0.6 MG/DL (ref 0.5–1.4)
CREAT SERPL-MCNC: 0.8 MG/DL (ref 0.5–1.4)
DIFFERENTIAL METHOD: ABNORMAL
EOSINOPHIL # BLD AUTO: 0.1 K/UL (ref 0–0.5)
EOSINOPHIL NFR BLD: 1.5 % (ref 0–8)
ERYTHROCYTE [DISTWIDTH] IN BLOOD BY AUTOMATED COUNT: 12.3 % (ref 11.5–14.5)
EST. GFR  (NO RACE VARIABLE): >60 ML/MIN/1.73 M^2
GLUCOSE SERPL-MCNC: 102 MG/DL (ref 70–110)
GLUCOSE SERPL-MCNC: 107 MG/DL (ref 70–110)
GLUCOSE UR QL STRIP: NEGATIVE
HCT VFR BLD AUTO: 35.7 % (ref 37–48.5)
HCT VFR BLD CALC: 35 %PCV (ref 36–54)
HGB BLD-MCNC: 12.3 G/DL (ref 12–16)
HGB UR QL STRIP: NEGATIVE
IMM GRANULOCYTES # BLD AUTO: 0.04 K/UL (ref 0–0.04)
IMM GRANULOCYTES NFR BLD AUTO: 0.8 % (ref 0–0.5)
KETONES UR QL STRIP: NEGATIVE
LEUKOCYTE ESTERASE UR QL STRIP: NEGATIVE
LIPASE SERPL-CCNC: 162 U/L (ref 4–60)
LYMPHOCYTES # BLD AUTO: 0.9 K/UL (ref 1–4.8)
LYMPHOCYTES NFR BLD: 16.9 % (ref 18–48)
MCH RBC QN AUTO: 34 PG (ref 27–31)
MCHC RBC AUTO-ENTMCNC: 34.5 G/DL (ref 32–36)
MCV RBC AUTO: 99 FL (ref 82–98)
MONOCYTES # BLD AUTO: 0.5 K/UL (ref 0.3–1)
MONOCYTES NFR BLD: 9.4 % (ref 4–15)
NEUTROPHILS # BLD AUTO: 3.7 K/UL (ref 1.8–7.7)
NEUTROPHILS NFR BLD: 70.8 % (ref 38–73)
NITRITE UR QL STRIP: NEGATIVE
NRBC BLD-RTO: 0 /100 WBC
PH UR STRIP: 7 [PH] (ref 5–8)
PLATELET # BLD AUTO: 243 K/UL (ref 150–450)
PMV BLD AUTO: 9.5 FL (ref 9.2–12.9)
POC IONIZED CALCIUM: 1.13 MMOL/L (ref 1.06–1.42)
POC TCO2 (MEASURED): 25 MMOL/L (ref 23–29)
POTASSIUM BLD-SCNC: 4.2 MMOL/L (ref 3.5–5.1)
POTASSIUM SERPL-SCNC: 4.2 MMOL/L (ref 3.5–5.1)
PROT SERPL-MCNC: 7.1 G/DL (ref 6–8.4)
PROT UR QL STRIP: NEGATIVE
RBC # BLD AUTO: 3.62 M/UL (ref 4–5.4)
SAMPLE: ABNORMAL
SODIUM BLD-SCNC: 134 MMOL/L (ref 136–145)
SODIUM SERPL-SCNC: 135 MMOL/L (ref 136–145)
SP GR UR STRIP: 1.01 (ref 1–1.03)
URN SPEC COLLECT METH UR: ABNORMAL
WBC # BLD AUTO: 5.21 K/UL (ref 3.9–12.7)

## 2023-10-09 PROCEDURE — G0378 HOSPITAL OBSERVATION PER HR: HCPCS

## 2023-10-09 PROCEDURE — 80053 COMPREHEN METABOLIC PANEL: CPT | Performed by: EMERGENCY MEDICINE

## 2023-10-09 PROCEDURE — 93010 ELECTROCARDIOGRAM REPORT: CPT | Mod: ,,, | Performed by: INTERNAL MEDICINE

## 2023-10-09 PROCEDURE — 96361 HYDRATE IV INFUSION ADD-ON: CPT

## 2023-10-09 PROCEDURE — 63600175 PHARM REV CODE 636 W HCPCS: Performed by: EMERGENCY MEDICINE

## 2023-10-09 PROCEDURE — 82330 ASSAY OF CALCIUM: CPT

## 2023-10-09 PROCEDURE — 99285 EMERGENCY DEPT VISIT HI MDM: CPT | Mod: 25

## 2023-10-09 PROCEDURE — 99223 PR INITIAL HOSPITAL CARE,LEVL III: ICD-10-PCS | Mod: AI,,, | Performed by: HOSPITALIST

## 2023-10-09 PROCEDURE — 80047 BASIC METABLC PNL IONIZED CA: CPT

## 2023-10-09 PROCEDURE — 99223 1ST HOSP IP/OBS HIGH 75: CPT | Mod: AI,,, | Performed by: HOSPITALIST

## 2023-10-09 PROCEDURE — 96376 TX/PRO/DX INJ SAME DRUG ADON: CPT

## 2023-10-09 PROCEDURE — 25000003 PHARM REV CODE 250

## 2023-10-09 PROCEDURE — 96375 TX/PRO/DX INJ NEW DRUG ADDON: CPT

## 2023-10-09 PROCEDURE — 81003 URINALYSIS AUTO W/O SCOPE: CPT | Performed by: EMERGENCY MEDICINE

## 2023-10-09 PROCEDURE — 63600175 PHARM REV CODE 636 W HCPCS: Performed by: HOSPITALIST

## 2023-10-09 PROCEDURE — 93005 ELECTROCARDIOGRAM TRACING: CPT

## 2023-10-09 PROCEDURE — 96365 THER/PROPH/DIAG IV INF INIT: CPT | Mod: 59

## 2023-10-09 PROCEDURE — 25500020 PHARM REV CODE 255: Performed by: EMERGENCY MEDICINE

## 2023-10-09 PROCEDURE — 93010 EKG 12-LEAD: ICD-10-PCS | Mod: ,,, | Performed by: INTERNAL MEDICINE

## 2023-10-09 PROCEDURE — 63600175 PHARM REV CODE 636 W HCPCS

## 2023-10-09 PROCEDURE — 85025 COMPLETE CBC W/AUTO DIFF WBC: CPT | Performed by: EMERGENCY MEDICINE

## 2023-10-09 PROCEDURE — 83690 ASSAY OF LIPASE: CPT | Performed by: EMERGENCY MEDICINE

## 2023-10-09 PROCEDURE — 25000003 PHARM REV CODE 250: Performed by: HOSPITALIST

## 2023-10-09 PROCEDURE — 25000003 PHARM REV CODE 250: Performed by: EMERGENCY MEDICINE

## 2023-10-09 RX ORDER — ZOLPIDEM TARTRATE 5 MG/1
5 TABLET ORAL NIGHTLY PRN
Status: DISCONTINUED | OUTPATIENT
Start: 2023-10-09 | End: 2023-10-10 | Stop reason: HOSPADM

## 2023-10-09 RX ORDER — IBUPROFEN 200 MG
24 TABLET ORAL
Status: DISCONTINUED | OUTPATIENT
Start: 2023-10-09 | End: 2023-10-10 | Stop reason: HOSPADM

## 2023-10-09 RX ORDER — SODIUM CHLORIDE 0.9 % (FLUSH) 0.9 %
10 SYRINGE (ML) INJECTION EVERY 12 HOURS PRN
Status: DISCONTINUED | OUTPATIENT
Start: 2023-10-09 | End: 2023-10-10 | Stop reason: HOSPADM

## 2023-10-09 RX ORDER — MORPHINE SULFATE 4 MG/ML
4 INJECTION, SOLUTION INTRAMUSCULAR; INTRAVENOUS EVERY 4 HOURS PRN
Status: DISCONTINUED | OUTPATIENT
Start: 2023-10-09 | End: 2023-10-10 | Stop reason: HOSPADM

## 2023-10-09 RX ORDER — MORPHINE SULFATE 15 MG/1
15 TABLET ORAL EVERY 4 HOURS PRN
Status: DISCONTINUED | OUTPATIENT
Start: 2023-10-09 | End: 2023-10-10 | Stop reason: HOSPADM

## 2023-10-09 RX ORDER — DIPHENHYDRAMINE HYDROCHLORIDE 50 MG/ML
12.5 INJECTION INTRAMUSCULAR; INTRAVENOUS EVERY 6 HOURS PRN
Status: DISCONTINUED | OUTPATIENT
Start: 2023-10-09 | End: 2023-10-10 | Stop reason: HOSPADM

## 2023-10-09 RX ORDER — BISMUTH SUBSALICYLATE 262 MG/1
TABLET ORAL 2 TIMES DAILY PRN
COMMUNITY

## 2023-10-09 RX ORDER — DIPHENHYDRAMINE HYDROCHLORIDE 50 MG/ML
12.5 INJECTION INTRAMUSCULAR; INTRAVENOUS
Status: COMPLETED | OUTPATIENT
Start: 2023-10-09 | End: 2023-10-09

## 2023-10-09 RX ORDER — LORATADINE 10 MG/1
10 TABLET ORAL DAILY PRN
COMMUNITY

## 2023-10-09 RX ORDER — MORPHINE SULFATE 4 MG/ML
4 INJECTION, SOLUTION INTRAMUSCULAR; INTRAVENOUS
Status: COMPLETED | OUTPATIENT
Start: 2023-10-09 | End: 2023-10-09

## 2023-10-09 RX ORDER — GLUCAGON 1 MG
1 KIT INJECTION
Status: DISCONTINUED | OUTPATIENT
Start: 2023-10-09 | End: 2023-10-10 | Stop reason: HOSPADM

## 2023-10-09 RX ORDER — PROCHLORPERAZINE EDISYLATE 5 MG/ML
5 INJECTION INTRAMUSCULAR; INTRAVENOUS EVERY 6 HOURS PRN
Status: DISCONTINUED | OUTPATIENT
Start: 2023-10-09 | End: 2023-10-10 | Stop reason: HOSPADM

## 2023-10-09 RX ORDER — PANTOPRAZOLE SODIUM 40 MG/1
40 TABLET, DELAYED RELEASE ORAL DAILY
Status: DISCONTINUED | OUTPATIENT
Start: 2023-10-09 | End: 2023-10-10 | Stop reason: HOSPADM

## 2023-10-09 RX ORDER — LISINOPRIL AND HYDROCHLOROTHIAZIDE 10; 12.5 MG/1; MG/1
1 TABLET ORAL DAILY
Status: DISCONTINUED | OUTPATIENT
Start: 2023-10-09 | End: 2023-10-10 | Stop reason: HOSPADM

## 2023-10-09 RX ORDER — MORPHINE SULFATE 2 MG/ML
2 INJECTION, SOLUTION INTRAMUSCULAR; INTRAVENOUS EVERY 4 HOURS PRN
Status: DISCONTINUED | OUTPATIENT
Start: 2023-10-09 | End: 2023-10-09

## 2023-10-09 RX ORDER — IBUPROFEN 200 MG
16 TABLET ORAL
Status: DISCONTINUED | OUTPATIENT
Start: 2023-10-09 | End: 2023-10-10 | Stop reason: HOSPADM

## 2023-10-09 RX ORDER — NALOXONE HCL 0.4 MG/ML
0.02 VIAL (ML) INJECTION
Status: DISCONTINUED | OUTPATIENT
Start: 2023-10-09 | End: 2023-10-10 | Stop reason: HOSPADM

## 2023-10-09 RX ORDER — SODIUM CHLORIDE, SODIUM LACTATE, POTASSIUM CHLORIDE, CALCIUM CHLORIDE 600; 310; 30; 20 MG/100ML; MG/100ML; MG/100ML; MG/100ML
INJECTION, SOLUTION INTRAVENOUS CONTINUOUS
Status: DISCONTINUED | OUTPATIENT
Start: 2023-10-09 | End: 2023-10-10 | Stop reason: HOSPADM

## 2023-10-09 RX ORDER — IBUPROFEN 200 MG
400 TABLET ORAL DAILY PRN
Status: ON HOLD | COMMUNITY
End: 2023-10-10 | Stop reason: HOSPADM

## 2023-10-09 RX ORDER — LEVOTHYROXINE SODIUM 88 UG/1
88 TABLET ORAL DAILY
Status: DISCONTINUED | OUTPATIENT
Start: 2023-10-09 | End: 2023-10-10 | Stop reason: HOSPADM

## 2023-10-09 RX ADMIN — MORPHINE SULFATE 4 MG: 4 INJECTION INTRAVENOUS at 11:10

## 2023-10-09 RX ADMIN — PANTOPRAZOLE SODIUM 40 MG: 40 TABLET, DELAYED RELEASE ORAL at 11:10

## 2023-10-09 RX ADMIN — PROMETHAZINE HYDROCHLORIDE 12.5 MG: 25 INJECTION INTRAMUSCULAR; INTRAVENOUS at 03:10

## 2023-10-09 RX ADMIN — MORPHINE SULFATE 4 MG: 4 INJECTION INTRAVENOUS at 06:10

## 2023-10-09 RX ADMIN — PROCHLORPERAZINE EDISYLATE 5 MG: 5 INJECTION INTRAMUSCULAR; INTRAVENOUS at 01:10

## 2023-10-09 RX ADMIN — IOHEXOL 15 ML: 350 INJECTION, SOLUTION INTRAVENOUS at 06:10

## 2023-10-09 RX ADMIN — DIPHENHYDRAMINE HYDROCHLORIDE 12.5 MG: 50 INJECTION, SOLUTION INTRAMUSCULAR; INTRAVENOUS at 06:10

## 2023-10-09 RX ADMIN — ZOLPIDEM TARTRATE 5 MG: 5 TABLET ORAL at 11:10

## 2023-10-09 RX ADMIN — MORPHINE SULFATE 4 MG: 4 INJECTION INTRAVENOUS at 03:10

## 2023-10-09 RX ADMIN — SODIUM CHLORIDE 1000 ML: 9 INJECTION, SOLUTION INTRAVENOUS at 05:10

## 2023-10-09 RX ADMIN — MORPHINE SULFATE 15 MG: 15 TABLET ORAL at 01:10

## 2023-10-09 RX ADMIN — SODIUM CHLORIDE, POTASSIUM CHLORIDE, SODIUM LACTATE AND CALCIUM CHLORIDE: 600; 310; 30; 20 INJECTION, SOLUTION INTRAVENOUS at 02:10

## 2023-10-09 RX ADMIN — DIPHENHYDRAMINE HYDROCHLORIDE 12.5 MG: 50 INJECTION INTRAMUSCULAR; INTRAVENOUS at 11:10

## 2023-10-09 RX ADMIN — IOHEXOL 75 ML: 350 INJECTION, SOLUTION INTRAVENOUS at 06:10

## 2023-10-09 RX ADMIN — LEVOTHYROXINE SODIUM 88 MCG: 88 TABLET ORAL at 11:10

## 2023-10-09 NOTE — H&P
[facility] - Emergency Dept  [facility] Medicine  History & Physical    Patient Name: Essence Tapia  MRN: [id number]  Patient Class: OP- Observation  Admission Date: 10/9/2023  Attending Physician: Deidre Lopez MD   Primary Care Provider: Noreen Stephenson MD       Patient information was obtained from patient and ER records.     Subjective:     Principal Problem:Drug-induced acute pancreatitis    Chief Complaint:   Chief Complaint   Patient presents with    Abdominal Pain     Abdominal pain, nausea, and distention. Hx of diverticulitis.         HPI: Ms Tapia is a 69yo lady with diverticulosis status post sigmoidectomy, HTN, HLD, and hypothyroidism who presented to ED with abdominal pain and nausea. She reported that her pain started in 07/2023 when she was prescribed Ozempic. Pt is not diabetic but was started on Ozempic to assist with weight management. She took the medication three times (weekly) in July and once in August. During this time, she stated that she began feeling central abdominal pain that sometimes radiated to her back. Associated symptoms included severe nausea with frequent emesis, constipation treated with metamucil  and decreased appetite. Her PCP prescribed phenergan for the nausea and discontinued the Ozempic. No emesis noted since stopping the medication. Her symptoms have persisted but are slightly less severe than when she was taking the Ozempic. No fevers, diarrhea or bloody bowel movements reported. Pt also has chronic back pain, treated ny her PCP. She reported taking 2 aleve daily for pain. Pt noted that she has chronic anxiety associated with recent unemployment and drinks 2-3 glasses of wine nightly. Dietary intake does contain some fatty foods, fruit juices, and starches.      Past Medical History:   Diagnosis Date    Diverticulitis     Edema     Hypertension     Spinal stenosis     Thyroid disease        Past Surgical History:   Procedure Laterality Date     APPENDECTOMY      BREAST SURGERY  2023     SECTION, CLASSIC      LIPOSUCTION  2023    NASAL RECONSTRUCTION      SIGMOIDECTOMY         Review of patient's allergies indicates:   Allergen Reactions    Codeine     Dilaudid [hydromorphone] Other (See Comments)     hallucinations    Hydrocodone Hives, Nausea And Vomiting and Other (See Comments)     hallucinations    Latex Itching    Tramadol     Zofran [ondansetron hcl (pf)]        No current facility-administered medications on file prior to encounter.     Current Outpatient Medications on File Prior to Encounter   Medication Sig    ACTIVATED CHARCOAL ORAL Take by mouth daily as needed (constipation).    acyclovir (ZOVIRAX) 400 MG tablet Take 400 mg by mouth daily as needed.    bismuth subsalicylate (BISMUTH) 262 mg Chew Take by mouth 2 (two) times daily as needed (stomach upset).    ibuprofen (ADVIL,MOTRIN) 200 MG tablet Take 400 mg by mouth daily as needed for Pain.    levothyroxine (SYNTHROID) 88 MCG tablet Take 1 tablet (88 mcg total) by mouth once daily.    lisinopriL-hydrochlorothiazide (PRINZIDE,ZESTORETIC) 10-12.5 mg per tablet TAKE 1 TABLET BY MOUTH EVERY DAY    loratadine (CLARITIN) 10 mg tablet Take 10 mg by mouth daily as needed for Allergies.    promethazine-dextromethorphan (PROMETHAZINE-DM) 6.25-15 mg/5 mL Syrp TAKE 5 ML BY MOUTH EVERY 4 HOURS AS NEEDED FOR COUGH    rosuvastatin (CRESTOR) 5 MG tablet Take 1 tablet (5 mg total) by mouth once daily.    temazepam (RESTORIL) 15 mg Cap TAKE 1 TO 2 CAPSULES BY MOUTH NIGHTLY AS NEEDED    [DISCONTINUED] promethazine (PHENERGAN) 6.25 mg/5 mL syrup Take 20 mLs (25 mg total) by mouth every 8 (eight) hours as needed for Nausea.     Family History       Problem Relation (Age of Onset)    Breast cancer Sister    No Known Problems Mother, Father          Tobacco Use    Smoking status: Never     Passive exposure: Never    Smokeless tobacco: Never   Substance and Sexual  Activity    Alcohol use: Yes     Alcohol/week: 18.0 standard drinks of alcohol     Types: 18 Glasses of wine per week    Drug use: No    Sexual activity: Not Currently     Review of Systems   Constitutional:  Positive for appetite change. Negative for activity change, chills, diaphoresis, fatigue and fever.   HENT:  Negative for congestion, sinus pressure and sore throat.    Eyes:  Negative for pain and redness.   Respiratory:  Negative for cough, chest tightness, shortness of breath and wheezing.    Cardiovascular:  Negative for chest pain, palpitations and leg swelling.   Gastrointestinal:  Positive for abdominal distention, abdominal pain, constipation, nausea and vomiting. Negative for blood in stool and diarrhea.   Genitourinary:  Negative for difficulty urinating, dysuria and hematuria.   Musculoskeletal:  Negative for arthralgias, joint swelling and myalgias.   Skin:  Negative for color change and pallor.   Neurological:  Negative for dizziness, weakness, numbness and headaches.   Psychiatric/Behavioral:  Negative for agitation and confusion.      Objective:     Vital Signs (Most Recent):  Temp: 98.2 °F (36.8 °C) (10/09/23 0921)  Pulse: 68 (10/09/23 1133)  Resp: 18 (10/09/23 1133)  BP: (Abnormal) 147/79 (10/09/23 1133)  SpO2: 97 % (10/09/23 1133) Vital Signs (24h Range):  Temp:  [98.2 °F (36.8 °C)-98.9 °F (37.2 °C)] 98.2 °F (36.8 °C)  Pulse:  [68-84] 68  Resp:  [18] 18  SpO2:  [95 %-100 %] 97 %  BP: (145-156)/(73-96) 147/79     Weight: 54.9 kg (121 lb)  Body mass index is 22.13 kg/m².     Physical Exam  Vitals and nursing note reviewed.   Constitutional:       General: She is not in acute distress.     Appearance: Normal appearance. She is not toxic-appearing or diaphoretic.   HENT:      Head: Normocephalic and atraumatic.      Nose: Nose normal. No congestion.      Mouth/Throat:      Mouth: Mucous membranes are moist.      Pharynx: Oropharynx is clear. No posterior oropharyngeal erythema.   Eyes:       General:         Right eye: No discharge.         Left eye: No discharge.      Pupils: Pupils are equal, round, and reactive to light.   Cardiovascular:      Rate and Rhythm: Normal rate and regular rhythm.      Pulses: Normal pulses.      Heart sounds: Normal heart sounds. No murmur heard.  Pulmonary:      Effort: Pulmonary effort is normal. No respiratory distress.      Breath sounds: Normal breath sounds. No stridor. No wheezing.   Chest:      Chest wall: No tenderness.   Abdominal:      General: Bowel sounds are normal. There is no distension.      Palpations: Abdomen is soft. There is no mass.      Tenderness: There is abdominal tenderness. There is no right CVA tenderness, left CVA tenderness or guarding.   Musculoskeletal:         General: No swelling, tenderness or deformity.      Cervical back: Normal range of motion and neck supple. No rigidity.   Skin:     General: Skin is warm.      Capillary Refill: Capillary refill takes less than 2 seconds.      Coloration: Skin is not jaundiced or pale.   Neurological:      General: No focal deficit present.      Mental Status: She is alert and oriented to person, place, and time. Mental status is at baseline.      Motor: No weakness.   Psychiatric:         Mood and Affect: Mood normal.         Behavior: Behavior normal.     CRANIAL NERVES     CN III, IV, VI   Pupils are equal, round, and reactive to light.       Significant Labs: All pertinent labs within the past 24 hours have been reviewed.  CBC:   Recent Labs   Lab 10/09/23  0333 10/09/23  0344   WBC 5.21  --    HGB 12.3  --    HCT 35.7* 35*     --      CMP:   Recent Labs   Lab 10/09/23  0333   *   K 4.2      CO2 24      BUN 11   CREATININE 0.8   CALCIUM 9.3   PROT 7.1   ALBUMIN 4.2   BILITOT 0.4   ALKPHOS 87   AST 40   ALT 47*   ANIONGAP 9     Lipase:   Recent Labs   Lab 10/09/23  0333   LIPASE 162*       Significant Imaging: I have reviewed all pertinent imaging results/findings within  the past 24 hours.  CTA/P W Contrast:    Impression:     1. Findings in keeping with uncomplicated acute pancreatitis.  No definite drainable fluid collection or abscess or other complication identified.  2. Nonspecific 4-5 mm solid nodule right middle lobe appears stable when compared to 04/14/2023 CT, but not convincingly demonstrated on more remote CT examinations.  For a solid nodule <6 mm, Fleischner Society 2017 guidelines recommend no routine follow up for a low risk patient, or follow-up with non-contrast chest CT at 12 months in a high risk patient.  3. Additional details of chronic and incidental findings, as provided in the body report.    Assessment/Plan:     * Drug-induced acute pancreatitis  Ms Tapia is a 67yo lady who presented with central abdominal pain with radiation to back which started in 07/2023 after pt was prescribed ozempic. Metcalfe Criteria Score of 1 at time of admission (age > 55), negative for severe pancreatitis. Lipase elevated at 165. CT results positive for pancreatitis but negative for cystic lesions or abscess. Contributing factors include daily alcohol consumption (>1 drink), daily NSAID use, and Ozempic (now discontinued. Pt admitted to Hospital medicine service for IV fluids, bowel rest, and serial monitoring of pancreatic enzymes in addition to further evaluation and treatment as outlined below in detail. GI consultation if symptoms do not improve within a reasonable amount of time. IV morphine PRN for pain. PPI for dyspepsia. Phenergan for nausea. Pt to remain on clear liquid diet, will advance as tolerated.   --Continue symptom management and IVFs  --Consult AES for acute change in symptoms  --Trend labs  --Advance diet as tolerated  --F/U Gastroenterology outpatient    Alcohol abuse  Pt reported consuming 2-3 glasses of wine daily. Associated with increased anxiety. Pt deinied having withdrawal symptoms. Will monitor CIWA score each shift.  --Check CIWA for acute  withdrawal  --Benzodiazepines PRN for worsening symptoms    Anxiety  Patient previously noted to have acute anxiety. Symptoms now worsenedwith current illness and unemployment status. PCP recommended Zoloft but medicaiton not started.  --F/U with PCP for evaluation of medical therapy  --Focused relaxation techniques: deep-breathing exercises, long baths, meditation, yoga, and resting in the dark.  --Establish support network: Reach out to supportive family member or friend.   --Consume less caffeine, tea, soda, and chocolate.  --Resume regular sleep patterns.  --Alcohol avoidance    Hypothyroidism  Patient endorses PMHx of hypothyroidism. Current symptoms: none . Patient denies change in energy level, diarrhea, heat / cold intolerance and nervousness. Symptoms have been well-controlled with synthroid.  --Continue home meds: synthroid 88mcg daily    Hypertension  Initiated home antihypertensives, Lisinopril and HCTZ to maintain pressures <180mmHg   - Discontinue use of NSAIDs  --Encourage low salt diet and exercise  --Encourage lifestyle changes and medication compliance     Diverticular disease  PMHx of diverticulosis. CT A/P W Contrast negative for evidence of contrast extravasation. Mild colonic diverticulosis observed.   --No additional action required at this time      VTE Risk Mitigation (From admission, onward)         Ordered     IP VTE LOW RISK PATIENT  Once         10/09/23 1034     Place sequential compression device  Until discontinued         10/09/23 1034                 On 10/09/2023, patient should be placed in hospital observation services under my care in collaboration with Dr Deidre Lopez.    Diony Kevin MD  Department of Hospital Medicine  [facility] - Emergency Dept

## 2023-10-09 NOTE — ED NOTES
I-STAT Chem-8+ Results:   Value Reference Range   Sodium 134 136-145 mmol/L   Potassium  4.2 3.5-5.1 mmol/L   Chloride 99  mmol/L   Ionized Calcium 1.13 1.06-1.42 mmol/L   CO2 (measured) 25 23-29 mmol/L   Glucose 107  mg/dL   BUN 10 6-30 mg/dL   Creatinine 0.6 0.5-1.4 mg/dL   Hematocrit 35 36-54%

## 2023-10-09 NOTE — ASSESSMENT & PLAN NOTE
Initiated home antihypertensives, Lisinopril and HCTZ to maintain pressures <180mmHg   - Discontinue use of NSAIDs  --Encourage low salt diet and exercise  --Encourage lifestyle changes and medication compliance

## 2023-10-09 NOTE — PROVIDER PROGRESS NOTES - EMERGENCY DEPT.
Encounter Date: 10/9/2023    ED Physician Progress Notes        ED Physician Hand-off Note:    ED Course: I assumed care of patient from off-going ED physician team. Briefly, Patient is a 67 yo F with PMH of diverticulitis status post sigmoidectomy as well as hypothyroidism, hypertension and hyperlipidemia who presented with severe epigastric pain.  Her lipase is elevated  Treated with IV morphine    At the time of signout plan was pending - CT abdomen and pelvis and reassessment.    Medications given in the ED:    Medications   morphine injection 4 mg (4 mg Intravenous Given 10/9/23 0346)   promethazine (PHENERGAN) 12.5 mg in dextrose 5 % (D5W) 50 mL IVPB (0 mg Intravenous Stopped 10/9/23 0411)   sodium chloride 0.9% bolus 1,000 mL 1,000 mL (0 mLs Intravenous Stopped 10/9/23 0634)   iohexoL (OMNIPAQUE 350) injection 75 mL (75 mLs Intravenous Given 10/9/23 0626)   iohexoL (OMNIPAQUE 350) injection 15 mL (15 mLs Oral Given 10/9/23 0627)   iohexoL (OMNIPAQUE 350) injection 15 mL (15 mLs Oral Given 10/9/23 0627)   morphine injection 4 mg (4 mg Intravenous Given 10/9/23 0641)   diphenhydrAMINE injection 12.5 mg (12.5 mg Intravenous Given 10/9/23 1106)     Imaging Results              CT Abdomen Pelvis With Contrast (Final result)  Result time 10/09/23 08:04:05      Final result by Kapil Wright MD (10/09/23 08:04:05)                   Impression:      1. Findings in keeping with uncomplicated acute pancreatitis.  No definite drainable fluid collection or abscess or other complication identified.  2. Nonspecific 4-5 mm solid nodule right middle lobe appears stable when compared to 04/14/2023 CT, but not convincingly demonstrated on more remote CT examinations.  For a solid nodule <6 mm, Fleischner Society 2017 guidelines recommend no routine follow up for a low risk patient, or follow-up with non-contrast chest CT at 12 months in a high risk patient.  3. Additional details of chronic and incidental findings, as  provided in the body report.      Electronically signed by: Kapil Wright  Date:    10/09/2023  Time:    08:04               Narrative:    EXAMINATION:  CT ABDOMEN PELVIS WITH CONTRAST    CLINICAL HISTORY:  Bowel obstruction suspected;Abdominal pain, acute, nonlocalized;    TECHNIQUE:  Low dose axial images, sagittal and coronal reformations were obtained from the lung bases to the pubic symphysis following the IV administration of 75 mL of Omnipaque 350 and the oral administration of 30 mL of Omnipaque 350.    COMPARISON:  CT of the abdomen pelvis performed 04/14/2023.    FINDINGS:  Lower chest: 4-5 mm solid nodule subpleural right middle lobe (series 2, image 5).  This appears stable when compared to 04/14/2023 CT, but not definitely present on more remote studies.  Atelectasis and scar noted elsewhere.    Liver: Unremarkable.    Gallbladder and bile ducts: Unremarkable. No biliary ductal dilatation.    Pancreas: Relatively homogeneous appearance of the pancreatic parenchyma.  Peripancreatic inflammation noted, most conspicuous about the head and uncinate and proximal to mid body.  No definite drainable fluid collection or abscess is appreciated.  No definite vascular complication is seen.    Spleen: Unremarkable.    Adrenals: Unremarkable.    Kidneys: Unremarkable.    Lymph nodes: Few prominent and mildly enlarged lymph nodes of peripancreatic region, favored reactive.    Bowel and mesentery: No definite evidence of bowel obstruction.  Enteric contrast material traverses the entirety of small bowel colon.  No evidence of contrast extravasation.Mild colonic diverticulosis.  Appendix not confidently identified.  No definite focal pericecal inflammation.    Abdominal aorta: Nonaneurysmal.  Moderate atherosclerosis.    Inferior vena cava: Unremarkable.    Free fluid or free air: As above.    Pelvis: Unremarkable.    Body wall: Unremarkable.    Bones: No definite acute change.  Scoliotic curvature of the spine.   Degenerative findings are noted involving the spine, sacroiliac joints, pubic symphysis, and hip joints.                                    I updated patient on her results    Disposition: admit to observation    Patient comfortable with plan for admission. Patient counseled regarding exam, results, diagnosis, treatment, and plan.    Impression: Final diagnoses:  [R10.9] Abdominal pain  [K85.30] Drug-induced acute pancreatitis, unspecified complication status (Primary)  [R10.9] Abdominal pain, unspecified abdominal location

## 2023-10-09 NOTE — TELEPHONE ENCOUNTER
----- Message from Basilio Santos sent at 10/9/2023  9:30 AM CDT -----  .Type:  Needs Medical Advice    Who Called: pt    Would the patient rather a call back or a response via [facility]? Call back  Best Call Back Number: [phone]  Additional Information:     Pt stated she is in the hospital and would like a call back please

## 2023-10-09 NOTE — ED TRIAGE NOTES
Essence Tapia, a 68 y.o. female presents to the ED w/ complaint of abdominal pain with distention and nausea for a few days.    Triage note:  Chief Complaint   Patient presents with    Abdominal Pain     Abdominal pain, nausea, and distention. Hx of diverticulitis.      Review of patient's allergies indicates:   Allergen Reactions    Codeine     Dilaudid [hydromorphone] Other (See Comments)     hallucinations    Hydrocodone Hives, Nausea And Vomiting and Other (See Comments)     hallucinations    Latex Itching    Tramadol     Zofran [ondansetron hcl (pf)]      Past Medical History:   Diagnosis Date    Diverticulitis     Edema     Spinal stenosis     Patient identifiers for Essence Tapia checked and correct.    LOC: The patient is awake, alert and aware of environment with an appropriate affect, the patient is oriented x 4 and speaking appropriately.    APPEARANCE: Patient resting comfortably and in no acute distress, patient is clean and well groomed, patient's clothing is properly fastened.    SKIN: The skin is warm and dry, color consistent with ethnicity, patient has normal skin turgor and moist mucus membranes, skin intact, no breakdown or bruising noted.    MUSCULOSKELETAL: Patient moving all extremities well, no obvious swelling or deformities noted.    RESPIRATORY: Airway is open and patent, respirations are spontaneous and even, patient has a normal effort and rate.    CARDIAC: Patient has a normal rate and rhythm, no periphreal edema noted, capillary refill < 3 seconds.    ABDOMEN: Soft and non tender to palpation, with some distention noted. Patient has some nausea, but no vomiting, diarrhea, or constipation.     NEUROLOGIC: Eyes open spontaneously, PERRL, behavior appropriate to situation, follows commands, facial expression symmetrical, bilateral hand grasp equal and even, purposeful motor response noted, normal sensation in all extremities.     HEENT: No abnormalities noted. White sclera  and pupils equal round and reactive to light. Denies headache, dizziness.     : Pt voids independently, denies dysuria, hematuria, frequency.

## 2023-10-09 NOTE — ED NOTES
Pt educated on how and when to properly swallow contrast dye by radiology tech. Pt verbalized understanding. Pt updated on plan of care and verbalized understanding.

## 2023-10-09 NOTE — ASSESSMENT & PLAN NOTE
Patient endorses PMHx of hypothyroidism. Current symptoms: none . Patient denies change in energy level, diarrhea, heat / cold intolerance and nervousness. Symptoms have been well-controlled with synthroid.  --Continue home meds: synthroid 88mcg daily

## 2023-10-09 NOTE — SUBJECTIVE & OBJECTIVE
Past Medical History:   Diagnosis Date    Diverticulitis     Edema     Hypertension     Spinal stenosis     Thyroid disease        Past Surgical History:   Procedure Laterality Date    APPENDECTOMY      BREAST SURGERY  2023     SECTION, CLASSIC      LIPOSUCTION  2023    NASAL RECONSTRUCTION      SIGMOIDECTOMY         Review of patient's allergies indicates:   Allergen Reactions    Codeine     Dilaudid [hydromorphone] Other (See Comments)     hallucinations    Hydrocodone Hives, Nausea And Vomiting and Other (See Comments)     hallucinations    Latex Itching    Tramadol     Zofran [ondansetron hcl (pf)]        No current facility-administered medications on file prior to encounter.     Current Outpatient Medications on File Prior to Encounter   Medication Sig    ACTIVATED CHARCOAL ORAL Take by mouth daily as needed (constipation).    acyclovir (ZOVIRAX) 400 MG tablet Take 400 mg by mouth daily as needed.    bismuth subsalicylate (BISMUTH) 262 mg Chew Take by mouth 2 (two) times daily as needed (stomach upset).    ibuprofen (ADVIL,MOTRIN) 200 MG tablet Take 400 mg by mouth daily as needed for Pain.    levothyroxine (SYNTHROID) 88 MCG tablet Take 1 tablet (88 mcg total) by mouth once daily.    lisinopriL-hydrochlorothiazide (PRINZIDE,ZESTORETIC) 10-12.5 mg per tablet TAKE 1 TABLET BY MOUTH EVERY DAY    loratadine (CLARITIN) 10 mg tablet Take 10 mg by mouth daily as needed for Allergies.    promethazine-dextromethorphan (PROMETHAZINE-DM) 6.25-15 mg/5 mL Syrp TAKE 5 ML BY MOUTH EVERY 4 HOURS AS NEEDED FOR COUGH    rosuvastatin (CRESTOR) 5 MG tablet Take 1 tablet (5 mg total) by mouth once daily.    temazepam (RESTORIL) 15 mg Cap TAKE 1 TO 2 CAPSULES BY MOUTH NIGHTLY AS NEEDED    [DISCONTINUED] promethazine (PHENERGAN) 6.25 mg/5 mL syrup Take 20 mLs (25 mg total) by mouth every 8 (eight) hours as needed for Nausea.     Family History       Problem Relation (Age of Onset)    Breast cancer Sister    No  Known Problems Mother, Father          Tobacco Use    Smoking status: Never     Passive exposure: Never    Smokeless tobacco: Never   Substance and Sexual Activity    Alcohol use: Yes     Alcohol/week: 18.0 standard drinks of alcohol     Types: 18 Glasses of wine per week    Drug use: No    Sexual activity: Not Currently     Review of Systems   Constitutional:  Positive for appetite change. Negative for activity change, chills, diaphoresis, fatigue and fever.   HENT:  Negative for congestion, sinus pressure and sore throat.    Eyes:  Negative for pain and redness.   Respiratory:  Negative for cough, chest tightness, shortness of breath and wheezing.    Cardiovascular:  Negative for chest pain, palpitations and leg swelling.   Gastrointestinal:  Positive for abdominal distention, abdominal pain, constipation, nausea and vomiting. Negative for blood in stool and diarrhea.   Genitourinary:  Negative for difficulty urinating, dysuria and hematuria.   Musculoskeletal:  Negative for arthralgias, joint swelling and myalgias.   Skin:  Negative for color change and pallor.   Neurological:  Negative for dizziness, weakness, numbness and headaches.   Psychiatric/Behavioral:  Negative for agitation and confusion.      Objective:     Vital Signs (Most Recent):  Temp: 98.2 °F (36.8 °C) (10/09/23 0921)  Pulse: 68 (10/09/23 1133)  Resp: 18 (10/09/23 1133)  BP: (Abnormal) 147/79 (10/09/23 1133)  SpO2: 97 % (10/09/23 1133) Vital Signs (24h Range):  Temp:  [98.2 °F (36.8 °C)-98.9 °F (37.2 °C)] 98.2 °F (36.8 °C)  Pulse:  [68-84] 68  Resp:  [18] 18  SpO2:  [95 %-100 %] 97 %  BP: (145-156)/(73-96) 147/79     Weight: 54.9 kg (121 lb)  Body mass index is 22.13 kg/m².     Physical Exam  Vitals and nursing note reviewed.   Constitutional:       General: She is not in acute distress.     Appearance: Normal appearance. She is not toxic-appearing or diaphoretic.   HENT:      Head: Normocephalic and atraumatic.      Nose: Nose normal. No  congestion.      Mouth/Throat:      Mouth: Mucous membranes are moist.      Pharynx: Oropharynx is clear. No posterior oropharyngeal erythema.   Eyes:      General:         Right eye: No discharge.         Left eye: No discharge.      Pupils: Pupils are equal, round, and reactive to light.   Cardiovascular:      Rate and Rhythm: Normal rate and regular rhythm.      Pulses: Normal pulses.      Heart sounds: Normal heart sounds. No murmur heard.  Pulmonary:      Effort: Pulmonary effort is normal. No respiratory distress.      Breath sounds: Normal breath sounds. No stridor. No wheezing.   Chest:      Chest wall: No tenderness.   Abdominal:      General: Bowel sounds are normal. There is no distension.      Palpations: Abdomen is soft. There is no mass.      Tenderness: There is abdominal tenderness. There is no right CVA tenderness, left CVA tenderness or guarding.   Musculoskeletal:         General: No swelling, tenderness or deformity.      Cervical back: Normal range of motion and neck supple. No rigidity.   Skin:     General: Skin is warm.      Capillary Refill: Capillary refill takes less than 2 seconds.      Coloration: Skin is not jaundiced or pale.   Neurological:      General: No focal deficit present.      Mental Status: She is alert and oriented to person, place, and time. Mental status is at baseline.      Motor: No weakness.   Psychiatric:         Mood and Affect: Mood normal.         Behavior: Behavior normal.              CRANIAL NERVES     CN III, IV, VI   Pupils are equal, round, and reactive to light.       Significant Labs: All pertinent labs within the past 24 hours have been reviewed.  CBC:   Recent Labs   Lab 10/09/23  0333 10/09/23  0344   WBC 5.21  --    HGB 12.3  --    HCT 35.7* 35*     --      CMP:   Recent Labs   Lab 10/09/23  0333   *   K 4.2      CO2 24      BUN 11   CREATININE 0.8   CALCIUM 9.3   PROT 7.1   ALBUMIN 4.2   BILITOT 0.4   ALKPHOS 87   AST 40   ALT  47*   ANIONGAP 9     Lipase:   Recent Labs   Lab 10/09/23  0333   LIPASE 162*       Significant Imaging: I have reviewed all pertinent imaging results/findings within the past 24 hours.  CTA/P W Contrast:    Impression:     1. Findings in keeping with uncomplicated acute pancreatitis.  No definite drainable fluid collection or abscess or other complication identified.  2. Nonspecific 4-5 mm solid nodule right middle lobe appears stable when compared to 04/14/2023 CT, but not convincingly demonstrated on more remote CT examinations.  For a solid nodule <6 mm, Fleischner Society 2017 guidelines recommend no routine follow up for a low risk patient, or follow-up with non-contrast chest CT at 12 months in a high risk patient.  3. Additional details of chronic and incidental findings, as provided in the body report.

## 2023-10-09 NOTE — ED PROVIDER NOTES
Encounter Date: 10/9/2023       History     Chief Complaint   Patient presents with    Abdominal Pain     Abdominal pain, nausea, and distention. Hx of diverticulitis.      HPI  68-year-old female who presents with abdominal pain, nausea.  Past medical history includes diverticulitis status post sigmoidectomy as well as hypothyroidism, hypertension and hyperlipidemia.  She reports that she would a breast reduction and liposuction in January.  Two weeks after that she got COVID and has not felt herself since then.  She is had some abnormal weight gain.  Her doctor did put her on Ozempic in August.  She received 3 doses but states I felt like I was dying.  So she stopped taking this.  She reports that she is had some chronic back pain that her doctor has been working up.  However, starting yesterday she started developing some nausea followed by abdominal distention and diffuse abdominal pain.  The pain is worse across her upper abdomen however radiates throughout the entire abdomen.  She feels like it is very full.  She reports some mild constipation but it still having bowel movements are normal, nonbloody.  Reports severe nausea today but no vomiting.  No fevers.  Reports reduced urination but no burning or blood.      Review of patient's allergies indicates:   Allergen Reactions    Codeine     Dilaudid [hydromorphone] Other (See Comments)     hallucinations    Hydrocodone Hives, Nausea And Vomiting and Other (See Comments)     hallucinations    Latex Itching    Tramadol     Zofran [ondansetron hcl (pf)]      Past Medical History:   Diagnosis Date    Diverticulitis     Edema     Spinal stenosis      Past Surgical History:   Procedure Laterality Date    APPENDECTOMY      BREAST SURGERY  2023     SECTION, CLASSIC      LIPOSUCTION  2023    NASAL RECONSTRUCTION      SIGMOIDECTOMY       Family History   Problem Relation Age of Onset    No Known Problems Mother     No Known Problems Father      Breast cancer Sister      Social History     Tobacco Use    Smoking status: Never     Passive exposure: Never    Smokeless tobacco: Never   Substance Use Topics    Alcohol use: Yes    Drug use: No     Review of Systems   Constitutional:  Negative for fever.   HENT:  Negative for sore throat.    Respiratory:  Negative for shortness of breath.    Cardiovascular:  Negative for chest pain.   Gastrointestinal:  Positive for abdominal distention, abdominal pain and nausea.   Genitourinary:  Negative for dysuria.   Musculoskeletal:  Negative for back pain.   Skin:  Negative for rash.   Neurological:  Negative for weakness.   Hematological:  Does not bruise/bleed easily.       Physical Exam     Initial Vitals [10/09/23 0250]   BP Pulse Resp Temp SpO2   (!) 156/96 84 18 98.9 °F (37.2 °C) 100 %      MAP       --         Physical Exam    Nursing note and vitals reviewed.  Constitutional: She appears well-developed and well-nourished.   Appears uncomfortable   HENT:   Head: Normocephalic and atraumatic.   Nose: Nose normal.   Eyes: Conjunctivae and EOM are normal. Pupils are equal, round, and reactive to light.   Neck: Neck supple.   Normal range of motion.  Cardiovascular:  Normal rate, regular rhythm and normal heart sounds.     Exam reveals no gallop and no friction rub.       No murmur heard.  Pulmonary/Chest: Breath sounds normal. No respiratory distress. She has no wheezes. She has no rhonchi. She has no rales.   Abdominal: Abdomen is soft. Bowel sounds are normal.   Mild distention but abdomen is soft  Very tender across the entire upper abdomen, diffuse mild tenderness in other quadrants  No CVA tenderness   Musculoskeletal:         General: No tenderness or edema. Normal range of motion.      Cervical back: Normal range of motion and neck supple.     Neurological: She is alert and oriented to person, place, and time. She has normal strength.   Skin: Skin is warm and dry.   Psychiatric: She has a normal mood and affect.          ED Course   Procedures  Labs Reviewed   CBC W/ AUTO DIFFERENTIAL - Abnormal; Notable for the following components:       Result Value    RBC 3.62 (*)     Hematocrit 35.7 (*)     MCV 99 (*)     MCH 34.0 (*)     Immature Granulocytes 0.8 (*)     Lymph # 0.9 (*)     Lymph % 16.9 (*)     All other components within normal limits   COMPREHENSIVE METABOLIC PANEL - Abnormal; Notable for the following components:    Sodium 135 (*)     ALT 47 (*)     All other components within normal limits   LIPASE - Abnormal; Notable for the following components:    Lipase 162 (*)     All other components within normal limits   URINALYSIS, REFLEX TO URINE CULTURE - Abnormal; Notable for the following components:    Color, UA Colorless (*)     All other components within normal limits    Narrative:     Specimen Source->Urine   ISTAT PROCEDURE - Abnormal; Notable for the following components:    POC Sodium 134 (*)     POC Hematocrit 35 (*)     All other components within normal limits   ISTAT CHEM8     EKG Readings: (Independently Interpreted)   Sinus, regular, rate 80, normal intervals, normal ST segments, artifact in lead 2 and 3 however no concern for ischemia       Imaging Results    None          Medications   sodium chloride 0.9% bolus 1,000 mL 1,000 mL (1,000 mLs Intravenous New Bag 10/9/23 6738)   iohexoL (OMNIPAQUE 350) injection 75 mL (has no administration in time range)   iohexoL (OMNIPAQUE 350) injection 15 mL (has no administration in time range)   iohexoL (OMNIPAQUE 350) injection 15 mL (has no administration in time range)   morphine injection 4 mg (has no administration in time range)   morphine injection 4 mg (4 mg Intravenous Given 10/9/23 6965)   promethazine (PHENERGAN) 12.5 mg in dextrose 5 % (D5W) 50 mL IVPB (0 mg Intravenous Stopped 10/9/23 8959)     Medical Decision Making  68-year-old female who presents with nausea and abdominal pain and distention starting yesterday.  She appears uncomfortable in her  abdomen is mildly distended but not firm.  However, she is very tender across her upper abdomen.  Differential includes pancreatitis, cholecystitis, gastritis peptic ulcer disease, or colitis.  Lower suspicion for AAA rupture or dissection, atypical ACS, renal colic or pyelonephritis.  She does have good bowel sounds and is stooling normally so lower suspicion for obstruction however with the vomiting and distention, certainly have to consider it.  Interestingly, she was recently on Ozempic which caused her to not feel well at all.  This could be pancreatitis or gastroparesis secondary to Ozempic as well.  We will give pain meds, antiemetics, get labs and obtain CT abdomen/pelvis.  Disposition pending.    Patient's lipase is elevated.  Only the 160 however does fit with her symptoms, recent Ozempic use.  She also reports 1-2 glasses of wine daily and significant stressors recently.  I added on IV fluids.  We are still pending a CT scan to rule out other significant findings including cholecystitis/gallstone pancreatitis, mass, abscess, her necrosis, or other cause.  I did not order a 2nd dose of morphine.  Disposition pending imaging and pain control.  Signed out to oncoming attending.    Amount and/or Complexity of Data Reviewed  Labs: ordered.  Radiology: ordered.    Risk  Prescription drug management.                               Clinical Impression:   Final diagnoses:  [R10.9] Abdominal pain  [K85.30] Drug-induced acute pancreatitis, unspecified complication status (Primary)  [R10.9] Abdominal pain, unspecified abdominal location               Telma Rod MD  10/09/23 9347

## 2023-10-09 NOTE — PLAN OF CARE
Ed Plan Care Note    Dx: Ozepmic-induced pancreatitis.     Shift Events: RUQ/LUQ abd. Pain/tenderness; nausea; morphine/phenergan PRN     Goals of Care: pain control     Neuro: AAOX4     Vital Signs: stable     Respiratory: RA     Diet: clear liquid     Is patient tolerating current diet? yes     GTTS: none     Urine Output/Bowel Movement: 10/08/2023     Drains/Tubes/Tube Feeds (include total output/shift): none     Lines: 1 peripheral IV     Accuchecks: none     Skin: in tact      Activity level? Independent      Any scheduled procedures? none     Any safety concerns? Fall risk precaution     Other: none

## 2023-10-09 NOTE — ASSESSMENT & PLAN NOTE
Patient previously noted to have acute anxiety. Symptoms now worsenedwith current illness and unemployment status. PCP recommended Zoloft but medicaiton not started.  --F/U with PCP for evaluation of medical therapy  --Focused relaxation techniques: deep-breathing exercises, long baths, meditation, yoga, and resting in the dark.  --Establish support network: Reach out to supportive family member or friend.   --Consume less caffeine, tea, soda, and chocolate.  --Resume regular sleep patterns.  --Alcohol avoidance

## 2023-10-09 NOTE — PHARMACY MED REC
"Admission Medication History     The home medication history was taken by Rosenda Miner.    You may go to "Admission" then "Reconcile Home Medications" tabs to review and/or act upon these items.     The home medication list has been updated by the Pharmacy department.   Please read ALL comments highlighted in yellow.   Please address this information as you see fit.    Feel free to contact us if you have any questions or require assistance.      Medications listed below were obtained from: Patient/family  Current Outpatient Medications on File Prior to Encounter   Medication Sig    ACTIVATED CHARCOAL ORAL Take by mouth daily as needed (constipation).    acyclovir (ZOVIRAX) 400 MG tablet Take 400 mg by mouth daily as needed.    bismuth subsalicylate (BISMUTH) 262 mg Chew Take by mouth 2 (two) times daily as needed (stomach upset).    ibuprofen (ADVIL,MOTRIN) 200 MG tablet Take 400 mg by mouth daily as needed for Pain.    levothyroxine (SYNTHROID) 88 MCG tablet Take 1 tablet (88 mcg total) by mouth once daily.    lisinopriL-hydrochlorothiazide (PRINZIDE,ZESTORETIC) 10-12.5 mg per tablet TAKE 1 TABLET BY MOUTH EVERY DAY    loratadine (CLARITIN) 10 mg tablet Take 10 mg by mouth daily as needed for Allergies.    promethazine-dextromethorphan (PROMETHAZINE-DM) 6.25-15 mg/5 mL Syrp TAKE 5 ML BY MOUTH EVERY 4 HOURS AS NEEDED FOR COUGH    rosuvastatin (CRESTOR) 5 MG tablet Take 1 tablet (5 mg total) by mouth once daily.    temazepam (RESTORIL) 15 mg Cap TAKE 1 TO 2 CAPSULES BY MOUTH NIGHTLY AS NEEDED               Rosenda Miner  EXT [id number]                  .          "

## 2023-10-09 NOTE — ASSESSMENT & PLAN NOTE
PMHx of diverticulosis. CT A/P W Contrast negative for evidence of contrast extravasation. Mild colonic diverticulosis observed.   --No additional action required at this time

## 2023-10-09 NOTE — HPI
Ms Tapia is a 67yo lady with diverticulosis status post sigmoidectomy, HTN, HLD, and hypothyroidism who presented to ED with abdominal pain and nausea. She reported that her pain started in 07/2023 when she was prescribed Ozempic. Pt is not diabetic but was started on Ozempic to assist with weight management. She took the medication three times (weekly) in July and once in August. During this time, she stated that she began feeling central abdominal pain that sometimes radiated to her back. Associated symptoms included severe nausea with frequent emesis, constipation treated with metamucil  and decreased appetite. Her PCP prescribed phenergan for the nausea and discontinued the Ozempic. No emesis noted since stopping the medication. Her symptoms have persisted but are slightly less severe than when she was taking the Ozempic. No fevers, diarrhea or bloody bowel movements reported. Pt also has chronic back pain, treated ny her PCP. She reported taking 2 aleve daily for pain. Pt noted that she has chronic anxiety associated with recent unemployment and drinks 2-3 glasses of wine nightly. Dietary intake does contain some fatty foods, fruit juices, and starches.

## 2023-10-09 NOTE — ASSESSMENT & PLAN NOTE
Pt reported consuming 2-3 glasses of wine daily. Associated with increased anxiety. Pt deinied having withdrawal symptoms. Will monitor CIWA score each shift.  --Check CIWA for acute withdrawal  --Benzodiazepines PRN for worsening symptoms

## 2023-10-09 NOTE — ASSESSMENT & PLAN NOTE
Ms Tapia is a 69yo lady who presented with central abdominal pain with radiation to back which started in 07/2023 after pt was prescribed ozempic. [place] Criteria Score of 1 at time of admission (age > 55), negative for severe pancreatitis. Lipase elevated at 165. CT results positive for pancreatitis but negative for cystic lesions or abscess. Contributing factors include daily alcohol consumption (>1 drink), daily NSAID use, and Ozempic (now discontinued. Pt admitted to Hospital medicine service for IV fluids, bowel rest, and serial monitoring of pancreatic enzymes in addition to further evaluation and treatment as outlined below in detail. GI consultation if symptoms do not improve within a reasonable amount of time. IV morphine PRN for pain. PPI for dyspepsia. Phenergan for nausea. Pt to remain on clear liquid diet, will advance as tolerated.   --Continue symptom management and IVFs  --Consult AES for acute change in symptoms  --Trend labs  --Advance diet as tolerated  --F/U Gastroenterology outpatient

## 2023-10-10 ENCOUNTER — NURSE TRIAGE (OUTPATIENT)
Dept: ADMINISTRATIVE | Facility: CLINIC | Age: 68
End: 2023-10-10
Payer: MEDICARE

## 2023-10-10 VITALS
BODY MASS INDEX: 22.3 KG/M2 | HEIGHT: 62 IN | OXYGEN SATURATION: 96 % | DIASTOLIC BLOOD PRESSURE: 93 MMHG | TEMPERATURE: 98 F | WEIGHT: 121.19 LBS | RESPIRATION RATE: 16 BRPM | HEART RATE: 78 BPM | SYSTOLIC BLOOD PRESSURE: 151 MMHG

## 2023-10-10 LAB
ALBUMIN SERPL BCP-MCNC: 3.7 G/DL (ref 3.5–5.2)
ALP SERPL-CCNC: 76 U/L (ref 55–135)
ALT SERPL W/O P-5'-P-CCNC: 37 U/L (ref 10–44)
ANION GAP SERPL CALC-SCNC: 9 MMOL/L (ref 8–16)
AST SERPL-CCNC: 28 U/L (ref 10–40)
BASOPHILS # BLD AUTO: 0.04 K/UL (ref 0–0.2)
BASOPHILS NFR BLD: 1 % (ref 0–1.9)
BILIRUB SERPL-MCNC: 0.5 MG/DL (ref 0.1–1)
BUN SERPL-MCNC: 6 MG/DL (ref 8–23)
CALCIUM SERPL-MCNC: 9.4 MG/DL (ref 8.7–10.5)
CHLORIDE SERPL-SCNC: 106 MMOL/L (ref 95–110)
CO2 SERPL-SCNC: 24 MMOL/L (ref 23–29)
CREAT SERPL-MCNC: 0.7 MG/DL (ref 0.5–1.4)
DIFFERENTIAL METHOD: ABNORMAL
EOSINOPHIL # BLD AUTO: 0.1 K/UL (ref 0–0.5)
EOSINOPHIL NFR BLD: 2.5 % (ref 0–8)
ERYTHROCYTE [DISTWIDTH] IN BLOOD BY AUTOMATED COUNT: 12.6 % (ref 11.5–14.5)
EST. GFR  (NO RACE VARIABLE): >60 ML/MIN/1.73 M^2
GLUCOSE SERPL-MCNC: 98 MG/DL (ref 70–110)
HCT VFR BLD AUTO: 32.8 % (ref 37–48.5)
HGB BLD-MCNC: 11.1 G/DL (ref 12–16)
IMM GRANULOCYTES # BLD AUTO: 0.03 K/UL (ref 0–0.04)
IMM GRANULOCYTES NFR BLD AUTO: 0.7 % (ref 0–0.5)
LYMPHOCYTES # BLD AUTO: 0.9 K/UL (ref 1–4.8)
LYMPHOCYTES NFR BLD: 22.2 % (ref 18–48)
MAGNESIUM SERPL-MCNC: 1.9 MG/DL (ref 1.6–2.6)
MCH RBC QN AUTO: 33.8 PG (ref 27–31)
MCHC RBC AUTO-ENTMCNC: 33.8 G/DL (ref 32–36)
MCV RBC AUTO: 100 FL (ref 82–98)
MONOCYTES # BLD AUTO: 0.4 K/UL (ref 0.3–1)
MONOCYTES NFR BLD: 10.1 % (ref 4–15)
NEUTROPHILS # BLD AUTO: 2.6 K/UL (ref 1.8–7.7)
NEUTROPHILS NFR BLD: 63.5 % (ref 38–73)
NRBC BLD-RTO: 0 /100 WBC
PHOSPHATE SERPL-MCNC: 4.1 MG/DL (ref 2.7–4.5)
PLATELET # BLD AUTO: 197 K/UL (ref 150–450)
PMV BLD AUTO: 9 FL (ref 9.2–12.9)
POTASSIUM SERPL-SCNC: 4.2 MMOL/L (ref 3.5–5.1)
PROT SERPL-MCNC: 6.3 G/DL (ref 6–8.4)
RBC # BLD AUTO: 3.28 M/UL (ref 4–5.4)
SODIUM SERPL-SCNC: 139 MMOL/L (ref 136–145)
WBC # BLD AUTO: 4.05 K/UL (ref 3.9–12.7)

## 2023-10-10 PROCEDURE — 80053 COMPREHEN METABOLIC PANEL: CPT

## 2023-10-10 PROCEDURE — 96361 HYDRATE IV INFUSION ADD-ON: CPT

## 2023-10-10 PROCEDURE — 85025 COMPLETE CBC W/AUTO DIFF WBC: CPT

## 2023-10-10 PROCEDURE — 63600175 PHARM REV CODE 636 W HCPCS: Performed by: HOSPITALIST

## 2023-10-10 PROCEDURE — 99239 PR HOSPITAL DISCHARGE DAY,>30 MIN: ICD-10-PCS | Mod: ,,, | Performed by: HOSPITALIST

## 2023-10-10 PROCEDURE — 25000003 PHARM REV CODE 250

## 2023-10-10 PROCEDURE — 96376 TX/PRO/DX INJ SAME DRUG ADON: CPT

## 2023-10-10 PROCEDURE — 63600175 PHARM REV CODE 636 W HCPCS

## 2023-10-10 PROCEDURE — 83735 ASSAY OF MAGNESIUM: CPT

## 2023-10-10 PROCEDURE — 84100 ASSAY OF PHOSPHORUS: CPT

## 2023-10-10 PROCEDURE — G0378 HOSPITAL OBSERVATION PER HR: HCPCS

## 2023-10-10 PROCEDURE — 99239 HOSP IP/OBS DSCHRG MGMT >30: CPT | Mod: ,,, | Performed by: HOSPITALIST

## 2023-10-10 PROCEDURE — 36415 COLL VENOUS BLD VENIPUNCTURE: CPT

## 2023-10-10 RX ORDER — PROMETHAZINE HYDROCHLORIDE AND DEXTROMETHORPHAN HYDROBROMIDE 6.25; 15 MG/5ML; MG/5ML
5 SYRUP ORAL EVERY 4 HOURS PRN
Qty: 180 ML | Refills: 1 | Status: SHIPPED | OUTPATIENT
Start: 2023-10-10 | End: 2023-11-03

## 2023-10-10 RX ORDER — MORPHINE SULFATE 15 MG/1
15 TABLET ORAL EVERY 6 HOURS PRN
Qty: 12 TABLET | Refills: 0 | Status: SHIPPED | OUTPATIENT
Start: 2023-10-10

## 2023-10-10 RX ADMIN — DIPHENHYDRAMINE HYDROCHLORIDE 12.5 MG: 50 INJECTION, SOLUTION INTRAMUSCULAR; INTRAVENOUS at 01:10

## 2023-10-10 RX ADMIN — MORPHINE SULFATE 4 MG: 4 INJECTION INTRAVENOUS at 08:10

## 2023-10-10 RX ADMIN — SODIUM CHLORIDE, POTASSIUM CHLORIDE, SODIUM LACTATE AND CALCIUM CHLORIDE: 600; 310; 30; 20 INJECTION, SOLUTION INTRAVENOUS at 08:10

## 2023-10-10 RX ADMIN — LEVOTHYROXINE SODIUM 88 MCG: 88 TABLET ORAL at 06:10

## 2023-10-10 RX ADMIN — LISINOPRIL AND HYDROCHLOROTHIAZIDE TABLETS 1 TABLET: 10; 12.5 TABLET ORAL at 08:10

## 2023-10-10 RX ADMIN — DIPHENHYDRAMINE HYDROCHLORIDE 12.5 MG: 50 INJECTION, SOLUTION INTRAMUSCULAR; INTRAVENOUS at 08:10

## 2023-10-10 RX ADMIN — PANTOPRAZOLE SODIUM 40 MG: 40 TABLET, DELAYED RELEASE ORAL at 08:10

## 2023-10-10 NOTE — DISCHARGE INSTRUCTIONS
Ms. Essence Sofy Tapia    A hospital follow up appointment has been scheduled with your PCP approximately one week after discharge. Please review the medications list and take both previous and new medications as indicated. A refill has been provided for phenergan to help manage nausea. Please continue to avoid NSAIDs, fatty meals, and alcohol.     It is important to notify your health care provider or present to the ED if you experience any of the following: temperature >100.4 or persistent nausea/vomiting or diarrhea.     Please message me if you have any outstanding questions and thanks for choosing [facility] as your health care provider.    Sincerely,  Diony Kevin MD  Internal Medicine, PGY-1  [facility]

## 2023-10-10 NOTE — PLAN OF CARE
Problem: Adult Inpatient Plan of Care  Goal: Plan of Care Review  10/10/2023 1235 by Nicki Fleming RN  Outcome: Met  10/10/2023 1235 by Nicki Fleming RN  Outcome: Ongoing, Progressing  Goal: Patient-Specific Goal (Individualized)  10/10/2023 1235 by Nicki Fleming RN  Outcome: Met  10/10/2023 1235 by Nicki Fleming RN  Outcome: Ongoing, Progressing  Goal: Absence of Hospital-Acquired Illness or Injury  10/10/2023 1235 by Nicki Fleming RN  Outcome: Met  10/10/2023 1235 by Nicki Fleming RN  Outcome: Ongoing, Progressing  Goal: Optimal Comfort and Wellbeing  10/10/2023 1235 by Nicki Fleming RN  Outcome: Met  10/10/2023 1235 by Nicki Fleming RN  Outcome: Ongoing, Progressing  Goal: Readiness for Transition of Care  10/10/2023 1235 by Nicki Fleming RN  Outcome: Met  10/10/2023 1235 by Nicki Fleming RN  Outcome: Ongoing, Progressing

## 2023-10-10 NOTE — HOSPITAL COURSE
Ms Tapia was admitted to hospital medicine for treatment of acute pancreatitis. IV fluids were started. Nausea managed with phenergan. Oxycodone PRN ordered for moderate pain with IV morphine for severe breakthrough pain. Pantoprazole was started for dyspepsia. Lipase elevated on arrival at 162, CT A/P W Contrast significant for acute pancreatitis with no cystic lesions or abscess. Pt managed symptomatically and started on clear liquid diet. Outpatient referral to PCP for hospital follow up scheduled. Pt educated on avoiding fatty diet, alcohol, and regular NSAID use. Stable for discharge on 10/10/23. Phenergan ordered for nausea. MSIR for acute breakthrough pain.

## 2023-10-10 NOTE — NURSING
Report handed off to STAS Cook on Observation, 11-hospital.  Transport put in, and patient made aware.

## 2023-10-10 NOTE — DISCHARGE SUMMARY
[facility] [address] [place] [address] Medicine  Discharge Summary      Patient Name: Essence Tapia  MRN: [id number]  MANUEL: [id number]  Patient Class: OP- Observation  Admission Date: 10/9/2023  Hospital Length of Stay: 0 days  Discharge Date and Time:  10/10/2023 10:39 AM  Attending Physician: Deidre Lopez MD   Discharging Provider: Diony Kevin MD  Primary Care Provider: Noreen Stephenson MD  [facility] Medicine Team: [facility] MED  Diony Kevin MD  Primary Care Team: [facility] V    HPI:   Ms Tapia is a 69yo lady with diverticulosis status post sigmoidectomy, HTN, HLD, and hypothyroidism who presented to ED with abdominal pain and nausea. She reported that her pain started in 07/2023 when she was prescribed Ozempic. Pt is not diabetic but was started on Ozempic to assist with weight management. She took the medication three times (weekly) in July and once in August. During this time, she stated that she began feeling central abdominal pain that sometimes radiated to her back. Associated symptoms included severe nausea with frequent emesis, constipation treated with metamucil  and decreased appetite. Her PCP prescribed phenergan for the nausea and discontinued the Ozempic. No emesis noted since stopping the medication. Her symptoms have persisted but are slightly less severe than when she was taking the Ozempic. No fevers, diarrhea or bloody bowel movements reported. Pt also has chronic back pain, treated ny her PCP. She reported taking 2 aleve daily for pain. Pt noted that she has chronic anxiety associated with recent unemployment and drinks 2-3 glasses of wine nightly. Dietary intake does contain some fatty foods, fruit juices, and starches.      * No surgery found *      Hospital Course:   Ms Tapia was admitted to hospital medicine for treatment of acute pancreatitis. IV fluids were started. Nausea managed with phenergan. Oxycodone PRN ordered for moderate pain with IV morphine for severe  breakthrough pain. Pantoprazole was started for dyspepsia. Lipase elevated on arrival at 162, CT A/P W Contrast significant for acute pancreatitis with no cystic lesions or abscess. Pt managed symptomatically and started on clear liquid diet. Outpatient referral to PCP for hospital follow up scheduled. Pt educated on avoiding fatty diet, alcohol, and regular NSAID use. Stable for discharge on 10/10/23. Phenergan ordered for nausea. MSIR for acute breakthrough pain.         Review of Systems   Constitutional:  Negative for activity change, appetite change, chills, diaphoresis, fatigue and fever.   HENT:  Negative for congestion, sinus pressure and sore throat.    Eyes:  Negative for pain and redness.   Respiratory:  Negative for cough, chest tightness, shortness of breath and wheezing.    Cardiovascular:  Negative for chest pain, palpitations and leg swelling.   Gastrointestinal:  Positive for abdominal pain and nausea. Negative for abdominal distention, blood in stool, constipation, diarrhea and vomiting.   Genitourinary:  Negative for difficulty urinating, dysuria and hematuria.   Musculoskeletal:  Negative for arthralgias, joint swelling and myalgias.   Skin:  Negative for color change and pallor.   Neurological:  Negative for dizziness, weakness, numbness and headaches.   Psychiatric/Behavioral:  Negative for agitation and confusion.      Objective:     Vital Signs (Most Recent):  Temp: 97.7 °F (36.5 °C) (10/10/23 0736)  Pulse: 80 (10/10/23 0736)  Resp: 18 (10/10/23 0838)  BP: (Abnormal) 142/85 (10/10/23 0736)  SpO2: 97 % (10/10/23 0736) Vital Signs (24h Range):  Temp:  [96.4 °F (35.8 °C)-98.1 °F (36.7 °C)] 97.7 °F (36.5 °C)  Pulse:  [68-87] 80  Resp:  [16-18] 18  SpO2:  [94 %-97 %] 97 %  BP: (127-163)/(72-87) 142/85     Weight: 55 kg (121 lb 2.6 oz)  Body mass index is 22.16 kg/m².  No intake or output data in the 24 hours ending 10/10/23 1038      Physical Exam  Vitals and nursing note reviewed.    Constitutional:       General: She is not in acute distress.     Appearance: Normal appearance. She is not toxic-appearing or diaphoretic.   HENT:      Head: Normocephalic and atraumatic.      Nose: Nose normal. No congestion.      Mouth/Throat:      Mouth: Mucous membranes are moist.      Pharynx: Oropharynx is clear. No posterior oropharyngeal erythema.   Eyes:      General:         Right eye: No discharge.         Left eye: No discharge.      Pupils: Pupils are equal, round, and reactive to light.   Cardiovascular:      Rate and Rhythm: Normal rate and regular rhythm.      Pulses: Normal pulses.      Heart sounds: Normal heart sounds. No murmur heard.  Pulmonary:      Effort: Pulmonary effort is normal. No respiratory distress.      Breath sounds: Normal breath sounds. No stridor. No wheezing.   Chest:      Chest wall: No tenderness.   Abdominal:      General: Bowel sounds are normal. There is no distension.      Palpations: Abdomen is soft. There is no mass.      Tenderness: There is abdominal tenderness. There is no right CVA tenderness, left CVA tenderness or guarding.   Musculoskeletal:         General: No swelling, tenderness or deformity.      Cervical back: Normal range of motion and neck supple. No rigidity.   Skin:     General: Skin is warm.      Capillary Refill: Capillary refill takes less than 2 seconds.      Coloration: Skin is not jaundiced or pale.   Neurological:      General: No focal deficit present.      Mental Status: She is alert and oriented to person, place, and time. Mental status is at baseline.      Motor: No weakness.   Psychiatric:         Mood and Affect: Mood normal.         Behavior: Behavior normal.     Goals of Care Treatment Preferences:  Code Status: Full Code      Consults:     No new Assessment & Plan notes have been filed under this hospital service since the last note was generated.  Service: Hospital Medicine    Final Active Diagnoses:    Diagnosis Date Noted POA     PRINCIPAL PROBLEM:  Drug-induced acute pancreatitis [K85.30] 10/09/2023 Unknown    Alcohol abuse [F10.10] 10/09/2023 Yes    Anxiety [F41.9] 10/09/2023 Unknown    Hypertension [I10] 01/09/2020 Yes    Hypothyroidism [E03.9] 01/09/2020 Yes    Diverticular disease [K57.90] 01/09/2020 Yes      Problems Resolved During this Admission:       Discharged Condition: stable    Disposition: Home or Self Care    Follow Up:   Follow-up Information     Noreen Stephenson MD Follow up in 1 week(s).    Specialty: Internal Medicine  Why: Hospital follow up for acute pancreatitis  Contact information:  [address] [facility] [address] [phone]                       Patient Instructions:      Notify your health care provider if you experience any of the following:  temperature >100.4     Notify your health care provider if you experience any of the following:  severe uncontrolled pain     Notify your health care provider if you experience any of the following:  persistent nausea and vomiting or diarrhea     Activity as tolerated       Significant Diagnostic Studies: Labs:   CMP   Recent Labs   Lab 10/09/23  0333 10/10/23  0314   * 139   K 4.2 4.2    106   CO2 24 24    98   BUN 11 6*   CREATININE 0.8 0.7   CALCIUM 9.3 9.4   PROT 7.1 6.3   ALBUMIN 4.2 3.7   BILITOT 0.4 0.5   ALKPHOS 87 76   AST 40 28   ALT 47* 37   ANIONGAP 9 9    and CBC   Recent Labs   Lab 10/09/23  0333 10/09/23  0344 10/10/23  0314   WBC 5.21  --  4.05   HGB 12.3  --  11.1*   HCT 35.7*   < > 32.8*     --  197    < > = values in this interval not displayed.       Pending Diagnostic Studies:     None         Medications:  Reconciled Home Medications:      Medication List      Start taking these medications    morphine 15 MG tablet  Commonly known as: MSIR  Take 1 tablet (15 mg total) by mouth every 6 (six) hours as needed for Pain.        Change how you take these medications    promethazine-dextromethorphan 6.25-15 mg/5 mL  Syrp  Commonly known as: PROMETHAZINE-DM  Take 5 mLs by mouth every 4 (four) hours as needed (nausea).  What changed: reasons to take this        Continue taking these medications    acyclovir 400 MG tablet  Commonly known as: ZOVIRAX  Take 400 mg by mouth daily as needed.     BISMUTH 262 mg Chew  Generic drug: bismuth subsalicylate  Take by mouth 2 (two) times daily as needed (stomach upset).     levothyroxine 88 MCG tablet  Commonly known as: SYNTHROID  Take 1 tablet (88 mcg total) by mouth once daily.     lisinopriL-hydrochlorothiazide 10-12.5 mg per tablet  Commonly known as: PRINZIDE,ZESTORETIC  TAKE 1 TABLET BY MOUTH EVERY DAY     loratadine 10 mg tablet  Commonly known as: CLARITIN  Take 10 mg by mouth daily as needed for Allergies.     rosuvastatin 5 MG tablet  Commonly known as: CRESTOR  Take 1 tablet (5 mg total) by mouth once daily.     temazepam 15 mg Cap  Commonly known as: RESTORIL  TAKE 1 TO 2 CAPSULES BY MOUTH NIGHTLY AS NEEDED        Stop taking these medications    ACTIVATED CHARCOAL ORAL     ibuprofen 200 MG tablet  Commonly known as: ADVIL,MOTRIN            Indwelling Lines/Drains at time of discharge:   Lines/Drains/Airways     None                 Time spent on the discharge of patient: 30 minutes         Diony Kevin MD  Department of Hospital Medicine  [facility]

## 2023-10-10 NOTE — TELEPHONE ENCOUNTER
Pt states that she was seen in the ED with pancreatitis. Pt states that she now has itching from Morphine 15 mg that was prescribed. Asking can she take Hydroxyzine.  Advised to speak with provider within 24 hours per protocol. Pt to callback if s/s worsens. VU. Encounter routed to provider for f/u.         Reason for Disposition   Taking prescription medication that could cause itching (e.g., codeine/morphine/other opiates, aspirin)    Additional Information   Negative: Patient sounds very sick or weak to the triager   Negative: Looks infected (spreading redness, red streak, pus)   Negative: [1] MODERATE-SEVERE local itching (i.e., interferes with work, school, activities) AND [2] not improved after 24 hours of hydrocortisone cream   Negative: [1] Cause unknown AND [2] present > 7 days   Negative: [1] Life-threatening reaction (anaphylaxis) in the past to similar substance (e.g., food, insect bite/sting, chemical, etc.) AND [2] < 2 hours since exposure   Negative: Difficulty breathing or wheezing   Negative: [1] Difficulty swallowing or slurred speech AND [2] sudden onset   Negative: Sounds like a life-threatening emergency to the triager   Negative: Patient sounds very sick or weak to the triager   Negative: [1] MODERATE-SEVERE widespread itching (i.e., interferes with sleep, normal activities or school) AND [2] not improved after 24 hours of itching Care Advice   Negative: [1] MODERATE-SEVERE widespread itching (i.e., interferes with sleep, normal activities or school) AND [2] pregnant    Protocols used: Itching - Ktldeegix-Y-FQ, Itching - Widespread-A-AH

## 2023-10-10 NOTE — PLAN OF CARE
[facility]  [facility]  Discharge Final Note    Primary Care Provider: Noreen Stephenson MD    Expected Discharge Date: 10/10/2023    Future Appointments   Date Time Provider Department Center   10/25/2023  8:40 AM Noreen Stephenson MD [facility] [place]   12/5/2023 10:40 AM Noreen Stephenson MD [facility] [place]     Pt discharged home with no services.  Hiren Griggs, RN,BSN        Final Discharge Note (most recent)       Final Note - 10/10/23 1312          Final Note    Assessment Type Final Discharge Note     Anticipated Discharge Disposition Home or Self Care     Hospital Resources/Appts/Education Provided Provided patient/caregiver with written discharge plan information;Appointments scheduled and added to AVS;Post-Acute resouces added to AVS        Post-Acute Status    Discharge Delays None known at this time                     Important Message from Medicare             Contact Info       Noreen Stephenson MD   Specialty: Internal Medicine   Relationship: PCP - General    [address] [place] [address]   Phone: [phone]       Next Steps: Follow up in 1 week(s)    Instructions: The Clinic Nurse will contact you with an appointment. If you do not hear from them in 48 hrs, please call [phone].

## 2023-10-10 NOTE — PLAN OF CARE
[facility]  Discharge Assessment    Primary Care Provider: Noreen Stephenson MD     Discharge Assessment (most recent)       BRIEF DISCHARGE ASSESSMENT - 10/10/23 1114          Discharge Planning    Assessment Type Discharge Planning Brief Assessment     Resource/Environmental Concerns none     Support Systems Children     Equipment Currently Used at Home none     Current Living Arrangements home     Patient/Family Anticipates Transition to home     Patient/Family Anticipated Services at Transition none     DME Needed Upon Discharge  none     Discharge Plan A Home     Discharge Plan B Home                   Pt is a 68 y.o. female admitted with Drig-induced acute pancreatitis and has a PMH of diverticulosis s/p sigmoidectomy and HTN. She lives alone on a two story house. She has not required DME in the past and is independent with her ADLs and iADLs and drives. Pt will have a ride home. [facility] Discharge Packet given to patient and/or family with understanding verbalized.   name and number and estimated discharge date written on white board in patient's room with request to call for any questions or concerns.  Will continue to follow for needs. Hiren Griggs, RN,BSN

## 2023-10-11 ENCOUNTER — PATIENT MESSAGE (OUTPATIENT)
Dept: PRIMARY CARE CLINIC | Facility: CLINIC | Age: 68
End: 2023-10-11
Payer: MEDICARE

## 2023-10-31 ENCOUNTER — HOSPITAL ENCOUNTER (OUTPATIENT)
Dept: RADIOLOGY | Facility: OTHER | Age: 68
Discharge: HOME | End: 2023-10-31
Attending: INTERNAL MEDICINE
Payer: MEDICARE

## 2023-10-31 DIAGNOSIS — R11.0 NAUSEA: ICD-10-CM

## 2023-10-31 PROCEDURE — 76700 US ABDOMEN COMPLETE: ICD-10-PCS | Mod: 26,,, | Performed by: RADIOLOGY

## 2023-10-31 PROCEDURE — 76700 US EXAM ABDOM COMPLETE: CPT | Mod: 26,,, | Performed by: RADIOLOGY

## 2023-10-31 PROCEDURE — 76700 US EXAM ABDOM COMPLETE: CPT | Mod: TC

## 2023-11-03 ENCOUNTER — OFFICE VISIT (OUTPATIENT)
Dept: PRIMARY CARE CLINIC | Facility: CLINIC | Age: 68
End: 2023-11-03
Payer: MEDICARE

## 2023-11-03 VITALS
OXYGEN SATURATION: 98 % | HEIGHT: 62 IN | BODY MASS INDEX: 22.84 KG/M2 | DIASTOLIC BLOOD PRESSURE: 72 MMHG | WEIGHT: 124.13 LBS | HEART RATE: 90 BPM | SYSTOLIC BLOOD PRESSURE: 138 MMHG

## 2023-11-03 DIAGNOSIS — I70.0 AORTIC ATHEROSCLEROSIS: Primary | ICD-10-CM

## 2023-11-03 DIAGNOSIS — Z87.19 HISTORY OF PANCREATITIS: ICD-10-CM

## 2023-11-03 DIAGNOSIS — F41.9 ANXIETY: ICD-10-CM

## 2023-11-03 DIAGNOSIS — K85.30 DRUG-INDUCED ACUTE PANCREATITIS WITHOUT INFECTION OR NECROSIS: ICD-10-CM

## 2023-11-03 DIAGNOSIS — R11.0 NAUSEA: ICD-10-CM

## 2023-11-03 PROCEDURE — 3008F PR BODY MASS INDEX (BMI) DOCUMENTED: ICD-10-PCS | Mod: CPTII,S$GLB,, | Performed by: INTERNAL MEDICINE

## 2023-11-03 PROCEDURE — 3078F DIAST BP <80 MM HG: CPT | Mod: CPTII,S$GLB,, | Performed by: INTERNAL MEDICINE

## 2023-11-03 PROCEDURE — 1159F MED LIST DOCD IN RCRD: CPT | Mod: CPTII,S$GLB,, | Performed by: INTERNAL MEDICINE

## 2023-11-03 PROCEDURE — 1159F PR MEDICATION LIST DOCUMENTED IN MEDICAL RECORD: ICD-10-PCS | Mod: CPTII,S$GLB,, | Performed by: INTERNAL MEDICINE

## 2023-11-03 PROCEDURE — 3008F BODY MASS INDEX DOCD: CPT | Mod: CPTII,S$GLB,, | Performed by: INTERNAL MEDICINE

## 2023-11-03 PROCEDURE — 3044F HG A1C LEVEL LT 7.0%: CPT | Mod: CPTII,S$GLB,, | Performed by: INTERNAL MEDICINE

## 2023-11-03 PROCEDURE — 3075F SYST BP GE 130 - 139MM HG: CPT | Mod: CPTII,S$GLB,, | Performed by: INTERNAL MEDICINE

## 2023-11-03 PROCEDURE — 3044F PR MOST RECENT HEMOGLOBIN A1C LEVEL <7.0%: ICD-10-PCS | Mod: CPTII,S$GLB,, | Performed by: INTERNAL MEDICINE

## 2023-11-03 PROCEDURE — 4010F ACE/ARB THERAPY RXD/TAKEN: CPT | Mod: CPTII,S$GLB,, | Performed by: INTERNAL MEDICINE

## 2023-11-03 PROCEDURE — 3078F PR MOST RECENT DIASTOLIC BLOOD PRESSURE < 80 MM HG: ICD-10-PCS | Mod: CPTII,S$GLB,, | Performed by: INTERNAL MEDICINE

## 2023-11-03 PROCEDURE — 3075F PR MOST RECENT SYSTOLIC BLOOD PRESS GE 130-139MM HG: ICD-10-PCS | Mod: CPTII,S$GLB,, | Performed by: INTERNAL MEDICINE

## 2023-11-03 PROCEDURE — 1160F RVW MEDS BY RX/DR IN RCRD: CPT | Mod: CPTII,S$GLB,, | Performed by: INTERNAL MEDICINE

## 2023-11-03 PROCEDURE — 4010F PR ACE/ARB THEARPY RXD/TAKEN: ICD-10-PCS | Mod: CPTII,S$GLB,, | Performed by: INTERNAL MEDICINE

## 2023-11-03 PROCEDURE — 99999 PR PBB SHADOW E&M-EST. PATIENT-LVL III: CPT | Mod: PBBFAC,,, | Performed by: INTERNAL MEDICINE

## 2023-11-03 PROCEDURE — 99214 OFFICE O/P EST MOD 30 MIN: CPT | Mod: S$GLB,,, | Performed by: INTERNAL MEDICINE

## 2023-11-03 PROCEDURE — 1160F PR REVIEW ALL MEDS BY PRESCRIBER/CLIN PHARMACIST DOCUMENTED: ICD-10-PCS | Mod: CPTII,S$GLB,, | Performed by: INTERNAL MEDICINE

## 2023-11-03 PROCEDURE — 99999 PR PBB SHADOW E&M-EST. PATIENT-LVL III: ICD-10-PCS | Mod: PBBFAC,,, | Performed by: INTERNAL MEDICINE

## 2023-11-03 PROCEDURE — 99214 PR OFFICE/OUTPT VISIT, EST, LEVL IV, 30-39 MIN: ICD-10-PCS | Mod: S$GLB,,, | Performed by: INTERNAL MEDICINE

## 2023-11-03 RX ORDER — HYDROXYZINE HYDROCHLORIDE 25 MG/1
25 TABLET, FILM COATED ORAL DAILY PRN
Qty: 20 TABLET | Refills: 0 | Status: SHIPPED | OUTPATIENT
Start: 2023-11-03 | End: 2023-11-28

## 2023-11-03 RX ORDER — PROMETHAZINE HYDROCHLORIDE 25 MG/1
25 TABLET ORAL EVERY 6 HOURS PRN
Qty: 120 TABLET | Refills: 0 | Status: SHIPPED | OUTPATIENT
Start: 2023-11-03 | End: 2024-03-01

## 2023-11-03 NOTE — PROGRESS NOTES
[facility] Note    Chief Complaint      Chief Complaint   Patient presents with    Hospital Follow Up     History of Present Illness      Essence Tapia is a 68 y.o. female who presents today for chief complaint hospital dc follow up.     HPI   Hosp dc follow up for GLP1 induced pancreatitis. Rx by plastics Dr Guerra   She cut back on alcohol and fats   She hs a hx of pancreatitis so she was high risk   Active Problem List with Overview Notes    Diagnosis Date Noted    Aortic atherosclerosis 2023    Alcohol abuse 10/09/2023    Drug-induced acute pancreatitis 10/09/2023    Anxiety 10/09/2023    Diverticular disease 2020     Sees jabier, had complicated admission in 2018 for diverticulitis with pseudomonal bacteremia         History of pancreatitis 2020    Hypertension 2020    Hyposomnia, insomnia or sleeplessness associated with anxiety 2020     Using prn temazepam, sometimes 15 and sometimes 30, taking drug holidays       Hypothyroidism 2020    Screening for colon cancer 2020     Last scope dr osorio kay 2010         Health Maintenance   Topic Date Due    Shingles Vaccine (1 of 2) Never done    DEXA Scan  2022    Mammogram  2023    Colorectal Cancer Screening  2024    Lipid Panel  2028    TETANUS VACCINE  03/10/2032    Hepatitis C Screening  Completed       Past Medical History:   Diagnosis Date    Diverticulitis     Edema     Hypertension     Spinal stenosis     Thyroid disease        Past Surgical History:   Procedure Laterality Date    APPENDECTOMY      BREAST SURGERY  2023     SECTION, CLASSIC      LIPOSUCTION  2023    NASAL RECONSTRUCTION      SIGMOIDECTOMY         family history includes Breast cancer in her sister; No Known Problems in her father and mother.    Social History     Tobacco Use    Smoking status: Never     Passive exposure: Never    Smokeless tobacco: Never   Substance Use  Topics    Alcohol use: Yes     Alcohol/week: 18.0 standard drinks of alcohol     Types: 18 Glasses of wine per week    Drug use: No       Review of Systems   Constitutional:  Negative for chills, fever, malaise/fatigue and weight loss.   Respiratory:  Negative for cough, sputum production, shortness of breath and wheezing.    Cardiovascular:  Negative for chest pain, palpitations, orthopnea and leg swelling.   Gastrointestinal:  Negative for constipation, diarrhea, nausea and vomiting.   Genitourinary:  Negative for dysuria, frequency, hematuria and urgency.        Outpatient Encounter Medications as of 11/3/2023   Medication Sig Note Dispense Refill    acyclovir (ZOVIRAX) 400 MG tablet Take 400 mg by mouth daily as needed.       bismuth subsalicylate (BISMUTH) 262 mg Chew Take by mouth 2 (two) times daily as needed (stomach upset).       levothyroxine (SYNTHROID) 88 MCG tablet Take 1 tablet (88 mcg total) by mouth once daily.  90 tablet 1    lisinopriL-hydrochlorothiazide (PRINZIDE,ZESTORETIC) 10-12.5 mg per tablet TAKE 1 TABLET BY MOUTH EVERY DAY  90 tablet 1    loratadine (CLARITIN) 10 mg tablet Take 10 mg by mouth daily as needed for Allergies.       morphine (MSIR) 15 MG tablet Take 1 tablet (15 mg total) by mouth every 6 (six) hours as needed for Pain.  12 tablet 0    rosuvastatin (CRESTOR) 5 MG tablet Take 1 tablet (5 mg total) by mouth once daily. 10/9/2023: Pt states she misses doses sometimes bc it makes her nauseous 90 tablet 3    temazepam (RESTORIL) 15 mg Cap TAKE 1 TO 2 CAPSULES BY MOUTH NIGHTLY AS NEEDED  60 capsule 5    [DISCONTINUED] promethazine-dextromethorphan (PROMETHAZINE-DM) 6.25-15 mg/5 mL Syrp Take 5 mLs by mouth every 4 (four) hours as needed (nausea).  180 mL 1    hydrOXYzine HCL (ATARAX) 25 MG tablet Take 1 tablet (25 mg total) by mouth daily as needed for Anxiety.  20 tablet 0    promethazine (PHENERGAN) 25 MG tablet Take 1 tablet (25 mg total) by mouth every 6 (six) hours as needed for  "Nausea.  120 tablet 0     No facility-administered encounter medications on file as of 11/3/2023.        Review of patient's allergies indicates:   Allergen Reactions    Codeine     Dilaudid [hydromorphone] Other (See Comments)     hallucinations    Hydrocodone Hives, Nausea And Vomiting and Other (See Comments)     hallucinations    Latex Itching    Tramadol     Zofran [ondansetron hcl (pf)]            Physical Exam      Vital Signs  Pulse: 90  SpO2: 98 %  BP: 138/72  BP Location: Right arm  Patient Position: Sitting  Height and Weight  Height: 5' 2" (157.5 cm)  Weight: 56.3 kg (124 lb 1.9 oz)  BSA (Calculated - sq m): 1.57 sq meters  BMI (Calculated): 22.7  Weight in (lb) to have BMI = 25: 136.4]    Physical Exam  Vitals reviewed.   Constitutional:       General: She is not in acute distress.     Appearance: She is well-developed. She is not diaphoretic.   HENT:      Head: Normocephalic and atraumatic.      Right Ear: External ear normal.      Left Ear: External ear normal.      Nose: Nose normal.   Eyes:      General:         Right eye: No discharge.         Left eye: No discharge.      Conjunctiva/sclera: Conjunctivae normal.   Cardiovascular:      Rate and Rhythm: Normal rate and regular rhythm.      Heart sounds: Normal heart sounds.   Pulmonary:      Effort: Pulmonary effort is normal. No respiratory distress.      Breath sounds: Normal breath sounds.   Musculoskeletal:         General: Normal range of motion.      Cervical back: Normal range of motion.   Skin:     Coloration: Skin is not pale.      Findings: No rash.   Neurological:      Mental Status: She is alert and oriented to person, place, and time.   Psychiatric:         Behavior: Behavior normal.         Thought Content: Thought content normal.          Laboratory:  CBC:  Recent Labs   Lab Result Units 10/09/23  0333 10/09/23  0344 10/10/23  0314   WBC K/uL 5.21  --  4.05   RBC M/uL 3.62*  --  3.28*   Hemoglobin g/dL 12.3  --  11.1*   POC Hematocrit  " " --    < >  --    Hematocrit % 35.7*  --  32.8*   Platelets K/uL 243  --  197   MCV fL 99*  --  100*   MCH pg 34.0*  --  33.8*   MCHC g/dL 34.5  --  33.8    < > = values in this interval not displayed.     CMP:  Recent Labs   Lab Result Units 10/09/23  0333 10/10/23  0314   Glucose mg/dL 102 98   Calcium mg/dL 9.3 9.4   Albumin g/dL 4.2 3.7   Total Protein g/dL 7.1 6.3   Sodium mmol/L 135* 139   Potassium mmol/L 4.2 4.2   CO2 mmol/L 24 24   Chloride mmol/L 102 106   BUN mg/dL 11 6*   Alkaline Phosphatase U/L 87 76   ALT U/L 47* 37   AST U/L 40 28   Total Bilirubin mg/dL 0.4 0.5     URINALYSIS:  Recent Labs   Lab Result Units 10/09/23  0521   Color, UA  Colorless*   Specific Gravity, UA  1.010   pH, UA  7.0   Protein, UA  Negative   Nitrite, UA  Negative   Leukocytes, UA  Negative      LIPIDS:  Recent Labs   Lab Result Units 09/11/23  1619   TSH uIU/mL 1.266     TSH:  Recent Labs   Lab Result Units 09/11/23  1619   TSH uIU/mL 1.266     A1C:  No results for input(s): "HGBA1C" in the last 2160 hours.    Radiology:      Assessment/Plan     Essence Tapia is a 68 y.o.female with:    1. Aortic atherosclerosis  Assessment & Plan:  Ct statin       2. History of pancreatitis  -     promethazine (PHENERGAN) 25 MG tablet; Take 1 tablet (25 mg total) by mouth every 6 (six) hours as needed for Nausea.  Dispense: 120 tablet; Refill: 0    3. Nausea  -     promethazine (PHENERGAN) 25 MG tablet; Take 1 tablet (25 mg total) by mouth every 6 (six) hours as needed for Nausea.  Dispense: 120 tablet; Refill: 0    4. Anxiety  -     hydrOXYzine HCL (ATARAX) 25 MG tablet; Take 1 tablet (25 mg total) by mouth daily as needed for Anxiety.  Dispense: 20 tablet; Refill: 0    5. Drug-induced acute pancreatitis without infection or necrosis  Assessment & Plan:  Resolved        Use of the [facility] Patient Portal discussed and encouraged during today's visit  -Continue current medications and maintain follow up with specialists.  Return to " clinic in .  Future Appointments   Date Time Provider Department Center   12/5/2023 10:40 AM Noreen Stephenson MD [facility]       Noreen Stephenson MD  11/3/2023 9:03 AM    Primary Care Internal Medicine

## 2023-11-06 ENCOUNTER — PATIENT MESSAGE (OUTPATIENT)
Dept: PRIMARY CARE CLINIC | Facility: CLINIC | Age: 68
End: 2023-11-06
Payer: MEDICARE

## 2023-11-20 ENCOUNTER — TELEPHONE (OUTPATIENT)
Dept: PRIMARY CARE CLINIC | Facility: CLINIC | Age: 68
End: 2023-11-20
Payer: MEDICARE

## 2023-11-27 DIAGNOSIS — F41.9 ANXIETY: ICD-10-CM

## 2023-11-28 RX ORDER — HYDROXYZINE HYDROCHLORIDE 25 MG/1
TABLET, FILM COATED ORAL
Qty: 20 TABLET | Refills: 0 | Status: SHIPPED | OUTPATIENT
Start: 2023-11-28

## 2023-11-29 ENCOUNTER — PATIENT MESSAGE (OUTPATIENT)
Dept: PRIMARY CARE CLINIC | Facility: CLINIC | Age: 68
End: 2023-11-29
Payer: MEDICARE

## 2023-12-03 ENCOUNTER — PATIENT MESSAGE (OUTPATIENT)
Dept: PRIMARY CARE CLINIC | Facility: CLINIC | Age: 68
End: 2023-12-03
Payer: MEDICARE

## 2023-12-04 ENCOUNTER — PATIENT MESSAGE (OUTPATIENT)
Dept: PRIMARY CARE CLINIC | Facility: CLINIC | Age: 68
End: 2023-12-04
Payer: MEDICARE

## 2023-12-04 DIAGNOSIS — F41.9 HYPOSOMNIA, INSOMNIA OR SLEEPLESSNESS ASSOCIATED WITH ANXIETY: ICD-10-CM

## 2023-12-04 DIAGNOSIS — F51.05 HYPOSOMNIA, INSOMNIA OR SLEEPLESSNESS ASSOCIATED WITH ANXIETY: ICD-10-CM

## 2023-12-04 NOTE — TELEPHONE ENCOUNTER
No care due was identified.  Health [facility] Embedded Care Due Messages. Reference number: [id number].   12/04/2023 12:05:25 AM CST

## 2023-12-05 RX ORDER — TEMAZEPAM 15 MG/1
CAPSULE ORAL
Qty: 60 CAPSULE | Refills: 0 | Status: SHIPPED | OUTPATIENT
Start: 2023-12-05 | End: 2023-12-27 | Stop reason: SDUPTHER

## 2023-12-27 ENCOUNTER — PATIENT MESSAGE (OUTPATIENT)
Dept: PRIMARY CARE CLINIC | Facility: CLINIC | Age: 68
End: 2023-12-27
Payer: MEDICARE

## 2023-12-27 DIAGNOSIS — F41.9 HYPOSOMNIA, INSOMNIA OR SLEEPLESSNESS ASSOCIATED WITH ANXIETY: ICD-10-CM

## 2023-12-27 DIAGNOSIS — F51.05 HYPOSOMNIA, INSOMNIA OR SLEEPLESSNESS ASSOCIATED WITH ANXIETY: ICD-10-CM

## 2023-12-27 RX ORDER — TEMAZEPAM 15 MG/1
30 CAPSULE ORAL NIGHTLY PRN
Qty: 60 CAPSULE | Refills: 0 | Status: SHIPPED | OUTPATIENT
Start: 2023-12-27 | End: 2024-01-30 | Stop reason: SDUPTHER

## 2024-01-11 DIAGNOSIS — Z00.00 ENCOUNTER FOR MEDICARE ANNUAL WELLNESS EXAM: ICD-10-CM

## 2024-01-30 ENCOUNTER — PATIENT MESSAGE (OUTPATIENT)
Dept: PRIMARY CARE CLINIC | Facility: CLINIC | Age: 69
End: 2024-01-30
Payer: MEDICARE

## 2024-01-30 DIAGNOSIS — F51.05 HYPOSOMNIA, INSOMNIA OR SLEEPLESSNESS ASSOCIATED WITH ANXIETY: ICD-10-CM

## 2024-01-30 DIAGNOSIS — F41.9 HYPOSOMNIA, INSOMNIA OR SLEEPLESSNESS ASSOCIATED WITH ANXIETY: ICD-10-CM

## 2024-01-30 RX ORDER — TEMAZEPAM 15 MG/1
30 CAPSULE ORAL NIGHTLY PRN
Qty: 60 CAPSULE | Refills: 0 | Status: SHIPPED | OUTPATIENT
Start: 2024-01-30 | End: 2024-03-01 | Stop reason: SDUPTHER

## 2024-01-30 NOTE — TELEPHONE ENCOUNTER
No care due was identified.  Health [facility] Embedded Care Due Messages. Reference number: [id number].   1/30/2024 4:14:42 PM CST

## 2024-02-29 DIAGNOSIS — Z87.19 HISTORY OF PANCREATITIS: ICD-10-CM

## 2024-02-29 DIAGNOSIS — R11.0 NAUSEA: ICD-10-CM

## 2024-02-29 NOTE — TELEPHONE ENCOUNTER
No care due was identified.  Health [facility] Embedded Care Due Messages. Reference number: [id number].   2/29/2024 3:52:01 PM CST

## 2024-03-01 DIAGNOSIS — F41.9 HYPOSOMNIA, INSOMNIA OR SLEEPLESSNESS ASSOCIATED WITH ANXIETY: ICD-10-CM

## 2024-03-01 DIAGNOSIS — F51.05 HYPOSOMNIA, INSOMNIA OR SLEEPLESSNESS ASSOCIATED WITH ANXIETY: ICD-10-CM

## 2024-03-01 RX ORDER — TEMAZEPAM 15 MG/1
30 CAPSULE ORAL NIGHTLY PRN
Qty: 60 CAPSULE | Refills: 0 | Status: SHIPPED | OUTPATIENT
Start: 2024-03-01 | End: 2024-04-01 | Stop reason: SDUPTHER

## 2024-03-01 RX ORDER — PROMETHAZINE HYDROCHLORIDE 25 MG/1
25 TABLET ORAL EVERY 6 HOURS PRN
Qty: 120 TABLET | Refills: 0 | Status: SHIPPED | OUTPATIENT
Start: 2024-03-01 | End: 2024-06-04

## 2024-03-01 NOTE — TELEPHONE ENCOUNTER
No care due was identified.  Health [facility] Embedded Care Due Messages. Reference number: [id number].   3/01/2024 8:08:50 AM CST

## 2024-03-19 ENCOUNTER — HOSPITAL ENCOUNTER (OUTPATIENT)
Dept: RADIOLOGY | Facility: HOSPITAL | Age: 69
Discharge: HOME | End: 2024-03-19
Attending: INTERNAL MEDICINE
Payer: MEDICARE

## 2024-03-19 VITALS — WEIGHT: 124 LBS | BODY MASS INDEX: 22.82 KG/M2 | HEIGHT: 62 IN

## 2024-03-19 DIAGNOSIS — Z12.31 ENCOUNTER FOR SCREENING MAMMOGRAM FOR BREAST CANCER: ICD-10-CM

## 2024-03-19 PROCEDURE — 77063 BREAST TOMOSYNTHESIS BI: CPT | Mod: 26,,, | Performed by: RADIOLOGY

## 2024-03-19 PROCEDURE — 77067 SCR MAMMO BI INCL CAD: CPT | Mod: TC

## 2024-03-19 PROCEDURE — 77067 SCR MAMMO BI INCL CAD: CPT | Mod: 26,,, | Performed by: RADIOLOGY

## 2024-04-01 DIAGNOSIS — F41.9 HYPOSOMNIA, INSOMNIA OR SLEEPLESSNESS ASSOCIATED WITH ANXIETY: ICD-10-CM

## 2024-04-01 DIAGNOSIS — F51.05 HYPOSOMNIA, INSOMNIA OR SLEEPLESSNESS ASSOCIATED WITH ANXIETY: ICD-10-CM

## 2024-04-01 NOTE — TELEPHONE ENCOUNTER
No care due was identified.  Health [facility] Embedded Care Due Messages. Reference number: [id number].   4/01/2024 8:21:40 AM CDT

## 2024-04-02 RX ORDER — TEMAZEPAM 15 MG/1
30 CAPSULE ORAL NIGHTLY PRN
Qty: 60 CAPSULE | Refills: 0 | Status: SHIPPED | OUTPATIENT
Start: 2024-04-02 | End: 2024-04-29 | Stop reason: SDUPTHER

## 2024-04-29 DIAGNOSIS — F41.9 ANXIETY: ICD-10-CM

## 2024-04-29 DIAGNOSIS — F51.05 HYPOSOMNIA, INSOMNIA OR SLEEPLESSNESS ASSOCIATED WITH ANXIETY: ICD-10-CM

## 2024-04-29 DIAGNOSIS — F41.9 HYPOSOMNIA, INSOMNIA OR SLEEPLESSNESS ASSOCIATED WITH ANXIETY: ICD-10-CM

## 2024-04-29 NOTE — TELEPHONE ENCOUNTER
No care due was identified.  [facility] Embedded Care Due Messages. Reference number: [id number].   4/29/2024 1:36:29 PM CDT

## 2024-04-30 RX ORDER — HYDROXYZINE HYDROCHLORIDE 25 MG/1
25 TABLET, FILM COATED ORAL DAILY PRN
Qty: 20 TABLET | Refills: 0 | Status: SHIPPED | OUTPATIENT
Start: 2024-04-30 | End: 2024-06-04

## 2024-04-30 RX ORDER — TEMAZEPAM 15 MG/1
30 CAPSULE ORAL NIGHTLY PRN
Qty: 60 CAPSULE | Refills: 0 | Status: SHIPPED | OUTPATIENT
Start: 2024-04-30 | End: 2024-05-28 | Stop reason: SDUPTHER

## 2024-05-28 DIAGNOSIS — F51.05 HYPOSOMNIA, INSOMNIA OR SLEEPLESSNESS ASSOCIATED WITH ANXIETY: ICD-10-CM

## 2024-05-28 DIAGNOSIS — F41.9 HYPOSOMNIA, INSOMNIA OR SLEEPLESSNESS ASSOCIATED WITH ANXIETY: ICD-10-CM

## 2024-05-28 RX ORDER — TEMAZEPAM 15 MG/1
30 CAPSULE ORAL NIGHTLY PRN
Qty: 60 CAPSULE | Refills: 0 | Status: CANCELLED | OUTPATIENT
Start: 2024-05-28

## 2024-05-28 NOTE — TELEPHONE ENCOUNTER
Care Due:                  Date            Visit Type   Department     Provider  --------------------------------------------------------------------------------                                HOSPITAL     [facility]  Last Visit: 11-      FOLLOW UP    CARE           Noreen Stephenson                              EP -                              PRIMARY      [facility]  Next Visit: 08-      CARE ([facility])   CARE           Noreen Stephenson                                                            Last  Test          Frequency    Reason                     Performed    Due Date  --------------------------------------------------------------------------------    Lipid Panel.  12 months..  rosuvastatin.............  06- 06-    Health [facility] Embedded Care Due Messages. Reference number: [id number].   5/28/2024 5:42:33 AM CDT

## 2024-05-29 ENCOUNTER — PATIENT MESSAGE (OUTPATIENT)
Dept: PRIMARY CARE CLINIC | Facility: CLINIC | Age: 69
End: 2024-05-29
Payer: MEDICARE

## 2024-05-29 DIAGNOSIS — F41.9 HYPOSOMNIA, INSOMNIA OR SLEEPLESSNESS ASSOCIATED WITH ANXIETY: ICD-10-CM

## 2024-05-29 DIAGNOSIS — F51.05 HYPOSOMNIA, INSOMNIA OR SLEEPLESSNESS ASSOCIATED WITH ANXIETY: ICD-10-CM

## 2024-05-29 NOTE — TELEPHONE ENCOUNTER
No care due was identified.  [facility] Embedded Care Due Messages. Reference number: [id number].   5/29/2024 2:14:26 PM CDT

## 2024-05-30 RX ORDER — TEMAZEPAM 15 MG/1
30 CAPSULE ORAL NIGHTLY PRN
Qty: 60 CAPSULE | Refills: 0 | Status: SHIPPED | OUTPATIENT
Start: 2024-05-30

## 2024-06-03 DIAGNOSIS — F41.9 ANXIETY: ICD-10-CM

## 2024-06-03 DIAGNOSIS — R11.0 NAUSEA: ICD-10-CM

## 2024-06-03 DIAGNOSIS — Z87.19 HISTORY OF PANCREATITIS: ICD-10-CM

## 2024-06-04 RX ORDER — PROMETHAZINE HYDROCHLORIDE 25 MG/1
25 TABLET ORAL EVERY 6 HOURS PRN
Qty: 120 TABLET | Refills: 0 | Status: SHIPPED | OUTPATIENT
Start: 2024-06-04

## 2024-06-04 RX ORDER — HYDROXYZINE HYDROCHLORIDE 25 MG/1
25 TABLET, FILM COATED ORAL DAILY PRN
Qty: 20 TABLET | Refills: 0 | Status: SHIPPED | OUTPATIENT
Start: 2024-06-04

## 2024-06-04 NOTE — TELEPHONE ENCOUNTER
No care due was identified.  [facility] Embedded Care Due Messages. Reference number: [id number].   6/03/2024 11:55:45 PM CDT

## 2024-06-11 NOTE — TELEPHONE ENCOUNTER
Refill Routing Note   Medication(s) are not appropriate for processing by [facility] for the following reason(s):        Required labs outdated    [facility] action(s):  Defer     Requires labs : Yes             Appointments  past 12m or future 3m with PCP    Date Provider   Last Visit   11/3/2023 Noreen Stephenson MD   Next Visit   8/7/2024 Noreen Stephenson MD   ED visits in past 90 days: 0        Note composed:4:20 AM 06/11/2024

## 2024-06-11 NOTE — TELEPHONE ENCOUNTER
Care Due:                  Date            Visit Type   Department     Provider  --------------------------------------------------------------------------------                                HOSPITAL     [facility]  Last Visit: 11-      FOLLOW UP    CARE           Noreen Stephensno                              EP -                              PRIMARY      [facility]  Next Visit: 08-      CARE ([facility])   CARE           Noreen Stephenson                                                            Last  Test          Frequency    Reason                     Performed    Due Date  --------------------------------------------------------------------------------    TSH.........  12 months..  levothyroxine............  09- 09-    [employer] Embedded Care Due Messages. Reference number: [id number].   6/11/2024 1:13:17 AM CDT

## 2024-06-12 RX ORDER — ROSUVASTATIN CALCIUM 5 MG/1
5 TABLET, COATED ORAL
Qty: 90 TABLET | Refills: 0 | Status: SHIPPED | OUTPATIENT
Start: 2024-06-12

## 2024-06-17 ENCOUNTER — PATIENT MESSAGE (OUTPATIENT)
Dept: PRIMARY CARE CLINIC | Facility: CLINIC | Age: 69
End: 2024-06-17
Payer: MEDICARE

## 2024-06-17 DIAGNOSIS — G89.29 CHRONIC NONINTRACTABLE HEADACHE, UNSPECIFIED HEADACHE TYPE: Primary | ICD-10-CM

## 2024-06-17 DIAGNOSIS — R51.9 CHRONIC NONINTRACTABLE HEADACHE, UNSPECIFIED HEADACHE TYPE: Primary | ICD-10-CM

## 2024-06-27 DIAGNOSIS — F51.05 HYPOSOMNIA, INSOMNIA OR SLEEPLESSNESS ASSOCIATED WITH ANXIETY: ICD-10-CM

## 2024-06-27 DIAGNOSIS — F41.9 HYPOSOMNIA, INSOMNIA OR SLEEPLESSNESS ASSOCIATED WITH ANXIETY: ICD-10-CM

## 2024-06-27 RX ORDER — TEMAZEPAM 15 MG/1
30 CAPSULE ORAL NIGHTLY PRN
Qty: 60 CAPSULE | Refills: 0 | Status: SHIPPED | OUTPATIENT
Start: 2024-06-27

## 2024-06-27 NOTE — TELEPHONE ENCOUNTER
No care due was identified.  [facility] Embedded Care Due Messages. Reference number: [id number].   6/27/2024 3:17:22 PM CDT

## 2024-07-20 DIAGNOSIS — F41.9 ANXIETY: ICD-10-CM

## 2024-07-20 DIAGNOSIS — Z87.19 HISTORY OF PANCREATITIS: ICD-10-CM

## 2024-07-20 DIAGNOSIS — R11.0 NAUSEA: ICD-10-CM

## 2024-07-20 NOTE — TELEPHONE ENCOUNTER
Care Due:                  Date            Visit Type   Department     Provider  --------------------------------------------------------------------------------                                HOSPITAL     [facility]  Last Visit: 11-      FOLLOW UP    CARE           Noreen Stephenson                              EP -                              PRIMARY      [facility]  Next Visit: 08-      CARE ([facility])   CARE           Noreen Stephenson                                                            Last  Test          Frequency    Reason                     Performed    Due Date  --------------------------------------------------------------------------------    CMP.........  12 months..  lisinopriL-hydrochlorothi  10-   10-                             azide, rosuvastatin......    [employer] Embedded Care Due Messages. Reference number: [id number].   7/20/2024 2:46:04 PM CDT

## 2024-07-23 RX ORDER — PROMETHAZINE HYDROCHLORIDE 25 MG/1
25 TABLET ORAL EVERY 6 HOURS PRN
Qty: 120 TABLET | Refills: 0 | Status: SHIPPED | OUTPATIENT
Start: 2024-07-23

## 2024-07-23 RX ORDER — HYDROXYZINE HYDROCHLORIDE 25 MG/1
25 TABLET, FILM COATED ORAL DAILY PRN
Qty: 20 TABLET | Refills: 0 | Status: SHIPPED | OUTPATIENT
Start: 2024-07-23

## 2024-07-29 DIAGNOSIS — F51.05 HYPOSOMNIA, INSOMNIA OR SLEEPLESSNESS ASSOCIATED WITH ANXIETY: ICD-10-CM

## 2024-07-29 DIAGNOSIS — F41.9 HYPOSOMNIA, INSOMNIA OR SLEEPLESSNESS ASSOCIATED WITH ANXIETY: ICD-10-CM

## 2024-07-29 RX ORDER — TEMAZEPAM 15 MG/1
30 CAPSULE ORAL NIGHTLY PRN
Qty: 60 CAPSULE | Refills: 0 | Status: CANCELLED | OUTPATIENT
Start: 2024-07-29

## 2024-07-29 NOTE — TELEPHONE ENCOUNTER
Care Due:                  Date            Visit Type   Department     Provider  --------------------------------------------------------------------------------                                HOSPITAL     [facility]  Last Visit: 11-      FOLLOW UP    CARE           Noreen Stephenson                              EP -                              PRIMARY      [facility]  Next Visit: 08-      CARE ([facility])   CARE           Noreen Stephenson                                                            Last  Test          Frequency    Reason                     Performed    Due Date  --------------------------------------------------------------------------------    Lipid Panel.  12 months..  rosuvastatin.............  06- 06-    [facility] Embedded Care Due Messages. Reference number: [id number].   7/29/2024 1:38:20 PM CDT

## 2024-07-31 DIAGNOSIS — F41.9 HYPOSOMNIA, INSOMNIA OR SLEEPLESSNESS ASSOCIATED WITH ANXIETY: ICD-10-CM

## 2024-07-31 DIAGNOSIS — F51.05 HYPOSOMNIA, INSOMNIA OR SLEEPLESSNESS ASSOCIATED WITH ANXIETY: ICD-10-CM

## 2024-07-31 RX ORDER — TEMAZEPAM 15 MG/1
30 CAPSULE ORAL NIGHTLY PRN
Qty: 60 CAPSULE | Refills: 0 | Status: CANCELLED | OUTPATIENT
Start: 2024-07-29

## 2024-07-31 NOTE — TELEPHONE ENCOUNTER
No care due was identified.  [facility] Embedded Care Due Messages. Reference number: [id number].   7/31/2024 1:04:58 PM CDT

## 2024-08-01 ENCOUNTER — TELEPHONE (OUTPATIENT)
Dept: PRIMARY CARE CLINIC | Facility: CLINIC | Age: 69
End: 2024-08-01
Payer: MEDICARE

## 2024-08-01 DIAGNOSIS — F41.9 HYPOSOMNIA, INSOMNIA OR SLEEPLESSNESS ASSOCIATED WITH ANXIETY: ICD-10-CM

## 2024-08-01 DIAGNOSIS — F51.05 HYPOSOMNIA, INSOMNIA OR SLEEPLESSNESS ASSOCIATED WITH ANXIETY: ICD-10-CM

## 2024-08-01 RX ORDER — TEMAZEPAM 15 MG/1
30 CAPSULE ORAL NIGHTLY PRN
Qty: 60 CAPSULE | Refills: 0 | Status: SHIPPED | OUTPATIENT
Start: 2024-08-01

## 2024-08-01 RX ORDER — TEMAZEPAM 15 MG/1
30 CAPSULE ORAL NIGHTLY PRN
Qty: 60 CAPSULE | Refills: 0 | Status: CANCELLED | OUTPATIENT
Start: 2024-07-29

## 2024-08-01 NOTE — TELEPHONE ENCOUNTER
----- Message from Concha Pineda sent at 8/1/2024 11:08 AM CDT -----  Type:  Needs Medical Advice/medication     Who Called: pt    Pharmacy name and phone #:  Telephone Fax  [phone]    Pharmacy Address and Hours    Address Hours  [address] [facility] [facility] [address] Mon-Fri, 8a-5:30p      Would the patient rather a call back or a response via [facility]? Call     Best Call Back Number: [phone]    Additional Information: pt has been reaching out for this medication for a few days temazepam (RESTORIL) 15 mg Cap

## 2024-08-01 NOTE — TELEPHONE ENCOUNTER
----- Message from [email or web address] sent at 8/1/2024  1:16 PM CDT -----  Regarding: Pt missed a call from the doctor's office regarding a previous message sent concering a medication refill and pt would like a call back today  Type:  Needs Medical Advice    Who Called: Patient Advice  Symptoms (please be specific): Pt missed a call from the doctor's office regarding a previous message sent regarding a medication refill  Best Call Back Number: [phone]

## 2024-08-01 NOTE — TELEPHONE ENCOUNTER
No care due was identified.  [facility] Embedded Care Due Messages. Reference number: [id number].   8/01/2024 1:35:25 PM CDT

## 2024-08-01 NOTE — TELEPHONE ENCOUNTER
Called to advise RX still pending provider review since provider was out on monday, no answer and no vm available.     Multiple request for med refill since 7/29

## 2024-08-01 NOTE — TELEPHONE ENCOUNTER
No care due was identified.  [facility] Embedded Care Due Messages. Reference number: [id number].   8/01/2024 11:03:34 AM CDT

## 2024-08-07 ENCOUNTER — TELEPHONE (OUTPATIENT)
Dept: PRIMARY CARE CLINIC | Facility: CLINIC | Age: 69
End: 2024-08-07
Payer: MEDICARE

## 2024-08-07 DIAGNOSIS — F41.9 ANXIETY: ICD-10-CM

## 2024-08-07 RX ORDER — HYDROXYZINE HYDROCHLORIDE 25 MG/1
25 TABLET, FILM COATED ORAL DAILY PRN
Qty: 90 TABLET | Refills: 1 | Status: SHIPPED | OUTPATIENT
Start: 2024-08-07

## 2024-08-08 ENCOUNTER — TELEPHONE (OUTPATIENT)
Dept: PRIMARY CARE CLINIC | Facility: CLINIC | Age: 69
End: 2024-08-08
Payer: MEDICARE

## 2024-08-16 ENCOUNTER — PATIENT MESSAGE (OUTPATIENT)
Dept: PRIMARY CARE CLINIC | Facility: CLINIC | Age: 69
End: 2024-08-16
Payer: MEDICARE

## 2024-08-29 DIAGNOSIS — F41.9 HYPOSOMNIA, INSOMNIA OR SLEEPLESSNESS ASSOCIATED WITH ANXIETY: ICD-10-CM

## 2024-08-29 DIAGNOSIS — F51.05 HYPOSOMNIA, INSOMNIA OR SLEEPLESSNESS ASSOCIATED WITH ANXIETY: ICD-10-CM

## 2024-08-29 RX ORDER — TEMAZEPAM 15 MG/1
30 CAPSULE ORAL NIGHTLY PRN
Qty: 60 CAPSULE | Refills: 0 | Status: CANCELLED | OUTPATIENT
Start: 2024-08-29

## 2024-08-29 RX ORDER — TEMAZEPAM 15 MG/1
30 CAPSULE ORAL NIGHTLY PRN
Qty: 60 CAPSULE | Refills: 0 | Status: SHIPPED | OUTPATIENT
Start: 2024-08-29

## 2024-08-29 NOTE — TELEPHONE ENCOUNTER
Netta showed up in the lobby requesting to speak with staff about sooner appointment. Rescheduled for 9/12.     Also asking for refill of medication to [facility], running out tomorrow and knows its a holiday weekend

## 2024-08-29 NOTE — TELEPHONE ENCOUNTER
Care Due:                  Date            Visit Type   Department     Provider  --------------------------------------------------------------------------------                                HOSPITAL     [facility]  Last Visit: 11-      FOLLOW UP    CARE           Noreen Stephenson                              EP -                              PRIMARY      [facility]  Next Visit: 09-      CARE ([facility])   CARE           Noreen Stephenson                                                            Last  Test          Frequency    Reason                     Performed    Due Date  --------------------------------------------------------------------------------    TSH.........  12 months..  levothyroxine............  09- 09-    [employer] Embedded Care Due Messages. Reference number: [id number].   8/29/2024 2:31:36 PM CDT

## 2024-08-31 NOTE — TELEPHONE ENCOUNTER
No care due was identified.  [facility] Embedded Care Due Messages. Reference number: [id number].   8/31/2024 1:06:13 PM CDT

## 2024-09-01 RX ORDER — LISINOPRIL AND HYDROCHLOROTHIAZIDE 10; 12.5 MG/1; MG/1
1 TABLET ORAL DAILY
Qty: 90 TABLET | Refills: 0 | Status: SHIPPED | OUTPATIENT
Start: 2024-09-01

## 2024-09-01 NOTE — TELEPHONE ENCOUNTER
Refill Routing Note   Medication(s) are not appropriate for processing by [facility] for the following reason(s):        Drug-disease interaction      [facility] action(s):  Defer               Appointments  past 12m or future 3m with PCP    Date Provider   Last Visit   11/3/2023 Noreen Stephenson MD   Next Visit   9/12/2024 Noreen Stephenson MD   ED visits in past 90 days: 0        Note composed:10:33 AM 09/01/2024

## 2024-09-07 DIAGNOSIS — E03.9 HYPOTHYROIDISM, UNSPECIFIED TYPE: ICD-10-CM

## 2024-09-07 NOTE — TELEPHONE ENCOUNTER
No care due was identified.  [facility] Embedded Care Due Messages. Reference number: [id number].   9/07/2024 1:55:02 AM CDT

## 2024-09-07 NOTE — TELEPHONE ENCOUNTER
Refill Routing Note   Medication(s) are not appropriate for processing by [facility] for the following reason(s):        Required labs outdated    [facility] action(s):  Defer               Appointments  past 12m or future 3m with PCP    Date Provider   Last Visit   11/3/2023 Noreen Stephenson MD   Next Visit   9/12/2024 Noreen Stephenson MD   ED visits in past 90 days: 0        Note composed:4:23 PM 09/07/2024

## 2024-09-09 RX ORDER — ROSUVASTATIN CALCIUM 5 MG/1
5 TABLET, COATED ORAL
Qty: 90 TABLET | Refills: 0 | Status: SHIPPED | OUTPATIENT
Start: 2024-09-09

## 2024-09-09 RX ORDER — LEVOTHYROXINE SODIUM 88 UG/1
88 TABLET ORAL
Qty: 90 TABLET | Refills: 0 | Status: SHIPPED | OUTPATIENT
Start: 2024-09-09

## 2024-09-10 ENCOUNTER — PATIENT MESSAGE (OUTPATIENT)
Dept: PRIMARY CARE CLINIC | Facility: CLINIC | Age: 69
End: 2024-09-10
Payer: MEDICARE

## 2024-09-26 DIAGNOSIS — F51.05 HYPOSOMNIA, INSOMNIA OR SLEEPLESSNESS ASSOCIATED WITH ANXIETY: ICD-10-CM

## 2024-09-26 DIAGNOSIS — F41.9 HYPOSOMNIA, INSOMNIA OR SLEEPLESSNESS ASSOCIATED WITH ANXIETY: ICD-10-CM

## 2024-09-26 RX ORDER — TEMAZEPAM 15 MG/1
30 CAPSULE ORAL NIGHTLY PRN
Qty: 60 CAPSULE | Refills: 0 | OUTPATIENT
Start: 2024-09-26

## 2024-09-26 RX ORDER — TEMAZEPAM 15 MG/1
30 CAPSULE ORAL NIGHTLY PRN
Qty: 60 CAPSULE | Refills: 0 | Status: SHIPPED | OUTPATIENT
Start: 2024-09-26

## 2024-09-26 NOTE — TELEPHONE ENCOUNTER
----- Message from Gi Russ sent at 9/26/2024  3:02 PM CDT -----  Type:  RX Refill Request    Who Called: Pt  Refill or New Rx:Refill  RX Name and Strength:temazepam (RESTORIL) 15 mg Cap  Preferred Pharmacy with phone number: [facility] [phone]  Would the patient rather a call back or a response via [facility]? Call  Best Call Back Number:[phone]  Additional Information:

## 2024-09-26 NOTE — TELEPHONE ENCOUNTER
No care due was identified.  [facility] Embedded Care Due Messages. Reference number: [id number].   9/26/2024 3:11:31 PM CDT

## 2024-09-26 NOTE — TELEPHONE ENCOUNTER
Care Due:                  Date            Visit Type   Department     Provider  --------------------------------------------------------------------------------                                HOSPITAL     [facility]  Last Visit: 11-      FOLLOW UP    CARE           Noreen Stephenson                              EP -                              PRIMARY      [facility]  Next Visit: 10-      CARE ([facility])   CARE           Noreen Stephenson                                                            Last  Test          Frequency    Reason                     Performed    Due Date  --------------------------------------------------------------------------------    CMP.........  12 months..  lisinopriL-hydrochlorothi  10-   10-                             azide, rosuvastatin......    [employer] Embedded Care Due Messages. Reference number: [id number].   9/26/2024 3:05:41 PM CDT

## 2024-09-30 ENCOUNTER — TELEPHONE (OUTPATIENT)
Dept: ALLERGY | Facility: CLINIC | Age: 69
End: 2024-09-30
Payer: MEDICARE

## 2024-09-30 NOTE — TELEPHONE ENCOUNTER
Patient's appointment time was change from 4 pm to 10 am.  Patient  verbalized understanding.    History of ankle surgery    History of myomectomy    History of tonsillectomy  and adenoidectomy at age 7

## 2024-09-30 NOTE — TELEPHONE ENCOUNTER
----- Message from Shaylee sent at 9/30/2024 10:10 AM CDT -----  Regarding: Pt called wld like to know if she can be seen earlier on the day of appt  Contact: [phone]  Name of Who is Calling:FRANCIS MOSER [[id number]]        What is the request in detail:Pt called wld like to know if she can be seen earlier on the day of appt . Please advise         Can the clinic reply by [facility]:No        What Number to Call Back if not in [facility]: Telephone Information:  Mobile          [phone]

## 2024-10-07 ENCOUNTER — LAB VISIT (OUTPATIENT)
Dept: LAB | Facility: HOSPITAL | Age: 69
End: 2024-10-07
Attending: INTERNAL MEDICINE
Payer: MEDICARE

## 2024-10-07 ENCOUNTER — OFFICE VISIT (OUTPATIENT)
Dept: PRIMARY CARE CLINIC | Facility: CLINIC | Age: 69
End: 2024-10-07
Payer: MEDICARE

## 2024-10-07 VITALS
SYSTOLIC BLOOD PRESSURE: 136 MMHG | OXYGEN SATURATION: 97 % | WEIGHT: 123.44 LBS | HEIGHT: 62 IN | HEART RATE: 99 BPM | DIASTOLIC BLOOD PRESSURE: 76 MMHG | BODY MASS INDEX: 22.71 KG/M2

## 2024-10-07 DIAGNOSIS — K57.90 DIVERTICULAR DISEASE: ICD-10-CM

## 2024-10-07 DIAGNOSIS — R10.13 EPIGASTRIC PAIN: ICD-10-CM

## 2024-10-07 DIAGNOSIS — G89.29 CHRONIC ABDOMINAL PAIN: ICD-10-CM

## 2024-10-07 DIAGNOSIS — Z12.31 ENCOUNTER FOR SCREENING MAMMOGRAM FOR MALIGNANT NEOPLASM OF BREAST: ICD-10-CM

## 2024-10-07 DIAGNOSIS — Z00.00 WELLNESS EXAMINATION: ICD-10-CM

## 2024-10-07 DIAGNOSIS — R11.0 NAUSEA: ICD-10-CM

## 2024-10-07 DIAGNOSIS — Z00.00 WELLNESS EXAMINATION: Primary | ICD-10-CM

## 2024-10-07 DIAGNOSIS — E03.9 HYPOTHYROIDISM, UNSPECIFIED TYPE: ICD-10-CM

## 2024-10-07 DIAGNOSIS — R10.9 CHRONIC ABDOMINAL PAIN: ICD-10-CM

## 2024-10-07 DIAGNOSIS — Z78.0 MENOPAUSE: ICD-10-CM

## 2024-10-07 DIAGNOSIS — I70.0 AORTIC ATHEROSCLEROSIS: ICD-10-CM

## 2024-10-07 DIAGNOSIS — R79.9 ABNORMAL FINDING OF BLOOD CHEMISTRY, UNSPECIFIED: ICD-10-CM

## 2024-10-07 DIAGNOSIS — Z87.19 HISTORY OF PANCREATITIS: ICD-10-CM

## 2024-10-07 LAB
ALBUMIN SERPL BCP-MCNC: 4.4 G/DL (ref 3.5–5.2)
ALP SERPL-CCNC: 88 U/L (ref 55–135)
ALT SERPL W/O P-5'-P-CCNC: 32 U/L (ref 10–44)
ANION GAP SERPL CALC-SCNC: 16 MMOL/L (ref 8–16)
AST SERPL-CCNC: 33 U/L (ref 10–40)
BILIRUB SERPL-MCNC: 0.2 MG/DL (ref 0.1–1)
BUN SERPL-MCNC: 11 MG/DL (ref 8–23)
CALCIUM SERPL-MCNC: 9.7 MG/DL (ref 8.7–10.5)
CHLORIDE SERPL-SCNC: 104 MMOL/L (ref 95–110)
CHOLEST SERPL-MCNC: 295 MG/DL (ref 120–199)
CHOLEST/HDLC SERPL: 3.3 {RATIO} (ref 2–5)
CO2 SERPL-SCNC: 22 MMOL/L (ref 23–29)
CREAT SERPL-MCNC: 0.7 MG/DL (ref 0.5–1.4)
ERYTHROCYTE [DISTWIDTH] IN BLOOD BY AUTOMATED COUNT: 12.5 % (ref 11.5–14.5)
EST. GFR  (NO RACE VARIABLE): >60 ML/MIN/1.73 M^2
GLUCOSE SERPL-MCNC: 87 MG/DL (ref 70–110)
HCT VFR BLD AUTO: 41.1 % (ref 37–48.5)
HDLC SERPL-MCNC: 89 MG/DL (ref 40–75)
HDLC SERPL: 30.2 % (ref 20–50)
HGB BLD-MCNC: 13.5 G/DL (ref 12–16)
LDLC SERPL CALC-MCNC: 157 MG/DL (ref 63–159)
LIPASE SERPL-CCNC: 15 U/L (ref 4–60)
MCH RBC QN AUTO: 32.7 PG (ref 27–31)
MCHC RBC AUTO-ENTMCNC: 32.8 G/DL (ref 32–36)
MCV RBC AUTO: 100 FL (ref 82–98)
NONHDLC SERPL-MCNC: 206 MG/DL
PLATELET # BLD AUTO: 224 K/UL (ref 150–450)
PMV BLD AUTO: 9.6 FL (ref 9.2–12.9)
POTASSIUM SERPL-SCNC: 4.5 MMOL/L (ref 3.5–5.1)
PROT SERPL-MCNC: 8 G/DL (ref 6–8.4)
RBC # BLD AUTO: 4.13 M/UL (ref 4–5.4)
SODIUM SERPL-SCNC: 142 MMOL/L (ref 136–145)
T4 FREE SERPL-MCNC: 0.96 NG/DL (ref 0.71–1.51)
TRIGL SERPL-MCNC: 245 MG/DL (ref 30–150)
TSH SERPL DL<=0.005 MIU/L-ACNC: 0.13 UIU/ML (ref 0.4–4)
WBC # BLD AUTO: 4.42 K/UL (ref 3.9–12.7)

## 2024-10-07 PROCEDURE — 80061 LIPID PANEL: CPT | Performed by: INTERNAL MEDICINE

## 2024-10-07 PROCEDURE — 80053 COMPREHEN METABOLIC PANEL: CPT | Performed by: INTERNAL MEDICINE

## 2024-10-07 PROCEDURE — 1159F MED LIST DOCD IN RCRD: CPT | Mod: CPTII,S$GLB,, | Performed by: INTERNAL MEDICINE

## 2024-10-07 PROCEDURE — 83690 ASSAY OF LIPASE: CPT | Performed by: INTERNAL MEDICINE

## 2024-10-07 PROCEDURE — 84443 ASSAY THYROID STIM HORMONE: CPT | Performed by: INTERNAL MEDICINE

## 2024-10-07 PROCEDURE — 82787 IGG 1 2 3 OR 4 EACH: CPT | Performed by: INTERNAL MEDICINE

## 2024-10-07 PROCEDURE — 3078F DIAST BP <80 MM HG: CPT | Mod: CPTII,S$GLB,, | Performed by: INTERNAL MEDICINE

## 2024-10-07 PROCEDURE — 99999 PR PBB SHADOW E&M-EST. PATIENT-LVL V: CPT | Mod: PBBFAC,,, | Performed by: INTERNAL MEDICINE

## 2024-10-07 PROCEDURE — 3008F BODY MASS INDEX DOCD: CPT | Mod: CPTII,S$GLB,, | Performed by: INTERNAL MEDICINE

## 2024-10-07 PROCEDURE — 3075F SYST BP GE 130 - 139MM HG: CPT | Mod: CPTII,S$GLB,, | Performed by: INTERNAL MEDICINE

## 2024-10-07 PROCEDURE — 85027 COMPLETE CBC AUTOMATED: CPT | Performed by: INTERNAL MEDICINE

## 2024-10-07 PROCEDURE — 4010F ACE/ARB THERAPY RXD/TAKEN: CPT | Mod: CPTII,S$GLB,, | Performed by: INTERNAL MEDICINE

## 2024-10-07 PROCEDURE — 86364 TISS TRNSGLTMNASE EA IG CLAS: CPT | Performed by: INTERNAL MEDICINE

## 2024-10-07 PROCEDURE — 99397 PER PM REEVAL EST PAT 65+ YR: CPT | Mod: S$GLB,,, | Performed by: INTERNAL MEDICINE

## 2024-10-07 PROCEDURE — 84439 ASSAY OF FREE THYROXINE: CPT | Performed by: INTERNAL MEDICINE

## 2024-10-07 NOTE — PROGRESS NOTES
[facility] Note    Chief Complaint      Chief Complaint   Patient presents with    Follow-up     History of Present Illness      Essence Tapia is a 69 y.o. female who presents today for chief complaint follow up abdominal pain .    HPI   Due fro annual last annual 6.23  Due for labs   Feeling poorly GI wise for about 8 weeks. She has distention and nausea with no tenderness, diffuse. This is chronic, she denies constipation   Sh has hx of pancreatitis and diverticulitis, also an episode of panceratitis 2/2 GLP1 use   We discussed fod map     She is on temazepam 30 mg for insomnia, she says she is refractory to this, she says pharmacist says we can leave refills on file. Has trid melatonin, and antihistamines. She has a circadian rhythm disorder     Mmg 3.24    Due for Cscope had seen [facility]         Active Problem List with Overview Notes    Diagnosis Date Noted    Aortic atherosclerosis 11/03/2023    Alcohol abuse 10/09/2023    Drug-induced acute pancreatitis 10/09/2023    Anxiety 10/09/2023    Diverticular disease 01/09/2020     Sees [facility], had complicated admission in 2018 for diverticulitis with pseudomonal bacteremia         History of pancreatitis 01/09/2020    Hypertension 01/09/2020    Hyposomnia, insomnia or sleeplessness associated with anxiety 01/09/2020     Using prn temazepam, sometimes 15 and sometimes 30, taking drug holidays       Hypothyroidism 01/09/2020    Screening for colon cancer 01/09/2020     Last scope dr osorio kay 2010         Health Maintenance   Topic Date Due    DEXA Scan  09/30/2022    Shingles Vaccine (2 of 2) 04/27/2024    Colorectal Cancer Screening  08/19/2024    Mammogram  03/19/2025    High Dose Statin  10/07/2025    Lipid Panel  06/13/2028    TETANUS VACCINE  03/10/2032    Hepatitis C Screening  Completed       Past Medical History:   Diagnosis Date    Diverticulitis     Edema     Hypertension     Spinal stenosis     Thyroid disease         Past Surgical History:   Procedure Laterality Date    APPENDECTOMY      BREAST SURGERY  2023     SECTION, CLASSIC      COLON SURGERY  2003    COSMETIC SURGERY      LIPOSUCTION  2023    NASAL RECONSTRUCTION      SIGMOIDECTOMY      TONSILLECTOMY      TOTAL REDUCTION MAMMOPLASTY Bilateral     and lift       family history includes Arthritis in her maternal grandmother; Breast cancer (age of onset: 48) in her sister; COPD in her father; Cancer in her sister; No Known Problems in her mother.    Social History     Tobacco Use    Smoking status: Never     Passive exposure: Never    Smokeless tobacco: Never   Substance Use Topics    Alcohol use: Yes     Alcohol/week: 10.0 standard drinks of alcohol     Types: 10 Glasses of wine per week    Drug use: No       Review of Systems   Constitutional:  Negative for chills, fever, malaise/fatigue and weight loss.   Respiratory:  Negative for cough, sputum production, shortness of breath and wheezing.    Cardiovascular:  Negative for chest pain, palpitations, orthopnea and leg swelling.   Gastrointestinal:  Positive for abdominal pain and nausea. Negative for constipation, diarrhea and vomiting.   Genitourinary:  Negative for dysuria, frequency, hematuria and urgency.        Outpatient Encounter Medications as of 10/7/2024   Medication Sig Dispense Refill    acyclovir (ZOVIRAX) 400 MG tablet Take 400 mg by mouth daily as needed.      bismuth subsalicylate (BISMUTH) 262 mg Chew Take by mouth 2 (two) times daily as needed (stomach upset).      hydrOXYzine HCL (ATARAX) 25 MG tablet Take 1 tablet (25 mg total) by mouth daily as needed for Anxiety. 90 tablet 1    levothyroxine (SYNTHROID) 88 MCG tablet TAKE 1 TABLET BY MOUTH EVERY DAY 90 tablet 0    lisinopriL-hydrochlorothiazide (PRINZIDE,ZESTORETIC) 10-12.5 mg per tablet Take 1 tablet by mouth once daily. 90 tablet 0    loratadine (CLARITIN) 10 mg tablet Take 10 mg by mouth daily as needed for  "Allergies.      morphine (MSIR) 15 MG tablet Take 1 tablet (15 mg total) by mouth every 6 (six) hours as needed for Pain. 12 tablet 0    promethazine (PHENERGAN) 25 MG tablet TAKE 1 TABLET BY MOUTH EVERY 6 HOURS AS NEEDED FOR NAUSEA 120 tablet 0    rosuvastatin (CRESTOR) 5 MG tablet TAKE 1 TABLET BY MOUTH EVERY DAY 90 tablet 0    temazepam (RESTORIL) 15 mg Cap Take 2 capsules (30 mg total) by mouth nightly as needed (sleep disturbance). 60 capsule 0    [DISCONTINUED] temazepam (RESTORIL) 15 mg Cap Take 2 capsules (30 mg total) by mouth nightly as needed (sleep disturbance). 60 capsule 0     No facility-administered encounter medications on file as of 10/7/2024.        Review of patient's allergies indicates:   Allergen Reactions    Codeine     Dilaudid [hydromorphone] Other (See Comments)     hallucinations    Hydrocodone Hives, Nausea And Vomiting and Other (See Comments)     hallucinations    Latex Itching    Tramadol     Zofran [ondansetron hcl (pf)]            Physical Exam      Vital Signs  Pulse: 99  SpO2: 97 %  BP: 136/76  BP Location: Left arm  Patient Position: Sitting  Height and Weight  Height: 5' 2" (157.5 cm)  Weight: 56 kg (123 lb 7.3 oz)  BSA (Calculated - sq m): 1.57 sq meters  BMI (Calculated): 22.6  Weight in (lb) to have BMI = 25: 136.4]    Physical Exam  Vitals reviewed.   Constitutional:       General: She is not in acute distress.     Appearance: She is well-developed. She is not diaphoretic.   HENT:      Head: Normocephalic and atraumatic.      Right Ear: External ear normal.      Left Ear: External ear normal.      Nose: Nose normal.   Eyes:      General:         Right eye: No discharge.         Left eye: No discharge.      Conjunctiva/sclera: Conjunctivae normal.   Cardiovascular:      Rate and Rhythm: Normal rate and regular rhythm.      Heart sounds: Normal heart sounds.   Pulmonary:      Effort: Pulmonary effort is normal. No respiratory distress.      Breath sounds: Normal breath sounds. "   Musculoskeletal:         General: Normal range of motion.      Cervical back: Normal range of motion.   Skin:     Coloration: Skin is not pale.      Findings: No rash.   Neurological:      Mental Status: She is alert and oriented to person, place, and time.   Psychiatric:         Behavior: Behavior normal.         Thought Content: Thought content normal.            Assessment/Plan     Essence Tapia is a 69 y.o.female with:    1. Wellness examination  -     Lipid Panel; Future; Expected date: 10/07/2024    2. Epigastric pain  -     Comprehensive Metabolic Panel; Future; Expected date: 10/07/2024  -     CBC Without Differential; Future; Expected date: 10/07/2024  -     Cancel: CT Abdomen Without Contrast; Future; Expected date: 10/07/2024  -     CT Abdomen Without Contrast; Future; Expected date: 10/07/2024    3. Chronic abdominal pain  -     Celiac Disease Panel; Future; Expected date: 10/07/2024    4. History of pancreatitis  -     Immunoglobulin G Subclass 4; Future; Expected date: 10/07/2024    5. Diverticular disease  Overview:  Maikel eugene, had complicated admission in 2018 for diverticulitis with pseudomonal bacteremia       Orders:  -     Ambulatory referral/consult to Gastroenterology; Future; Expected date: 10/14/2024    6. Hypothyroidism, unspecified type  -     T4, Free; Future; Expected date: 10/07/2024  -     TSH; Future; Expected date: 10/07/2024    7. Abnormal finding of blood chemistry, unspecified  -     Lipid Panel; Future; Expected date: 10/07/2024    8. Aortic atherosclerosis  Assessment & Plan:  Continue crestor       9. Encounter for screening mammogram for malignant neoplasm of breast    10. Menopause  -     DXA Bone Density Axial Skeleton 1 or more sites; Future; Expected date: 10/07/2024         Use of the [employer] Patient Portal discussed and encouraged during today's visit  -Continue current medications and maintain follow up with specialists.    Future Appointments   Date Time  Provider Department [place]   10/29/2024  2:20 PM Noreen Stephenson MD [facility]   12/17/2024  4:00 PM Gema Lebron MD [facility]       Noreen Stephenson MD  10/7/2024 9:48 AM    Primary Care Internal Medicine

## 2024-10-09 ENCOUNTER — HOSPITAL ENCOUNTER (OUTPATIENT)
Dept: RADIOLOGY | Facility: HOSPITAL | Age: 69
Discharge: HOME | End: 2024-10-09
Attending: INTERNAL MEDICINE
Payer: MEDICARE

## 2024-10-09 DIAGNOSIS — R10.13 EPIGASTRIC PAIN: ICD-10-CM

## 2024-10-09 PROCEDURE — 74150 CT ABDOMEN W/O CONTRAST: CPT | Mod: TC

## 2024-10-09 PROCEDURE — 74150 CT ABDOMEN W/O CONTRAST: CPT | Mod: 26,,, | Performed by: RADIOLOGY

## 2024-10-10 LAB
GLIADIN PEPTIDE IGA SER-ACNC: 0.5 U/ML
GLIADIN PEPTIDE IGG SER-ACNC: 4.7 U/ML
IGA SERPL-MCNC: 238 MG/DL (ref 70–400)
IGG4 SER-MCNC: 13 MG/DL (ref 4–86)
TTG IGA SER-ACNC: 0.3 U/ML
TTG IGG SER-ACNC: 0.7 U/ML

## 2024-10-17 ENCOUNTER — HOSPITAL ENCOUNTER (OUTPATIENT)
Facility: HOSPITAL | Age: 69
Discharge: HOME | End: 2024-10-17
Attending: STUDENT IN AN ORGANIZED HEALTH CARE EDUCATION/TRAINING PROGRAM | Admitting: STUDENT IN AN ORGANIZED HEALTH CARE EDUCATION/TRAINING PROGRAM
Payer: MEDICARE

## 2024-10-17 VITALS
DIASTOLIC BLOOD PRESSURE: 81 MMHG | HEART RATE: 87 BPM | TEMPERATURE: 98 F | OXYGEN SATURATION: 96 % | BODY MASS INDEX: 22.08 KG/M2 | WEIGHT: 120 LBS | HEIGHT: 62 IN | SYSTOLIC BLOOD PRESSURE: 145 MMHG | RESPIRATION RATE: 18 BRPM

## 2024-10-17 DIAGNOSIS — R07.9 CHEST PAIN: Primary | ICD-10-CM

## 2024-10-17 DIAGNOSIS — F32.A DEPRESSION, UNSPECIFIED DEPRESSION TYPE: ICD-10-CM

## 2024-10-17 LAB
ALBUMIN SERPL BCP-MCNC: 3.2 G/DL (ref 3.5–5.2)
ALP SERPL-CCNC: 61 U/L (ref 55–135)
ALT SERPL W/O P-5'-P-CCNC: 32 U/L (ref 10–44)
ANION GAP SERPL CALC-SCNC: 12 MMOL/L (ref 8–16)
ASCENDING AORTA: 3.04 CM
AST SERPL-CCNC: 45 U/L (ref 10–40)
AV AREA BY CONTINUOUS VTI: 2.3 CM2
AV INDEX (PROSTH): 0.78
AV LVOT MEAN GRADIENT: 2 MMHG
AV LVOT PEAK GRADIENT: 4 MMHG
AV MEAN GRADIENT: 3.3 MMHG
AV PEAK GRADIENT: 6.8 MMHG
AV VALVE AREA BY VELOCITY RATIO: 2.2 CM²
AV VALVE AREA: 2.2 CM2
AV VELOCITY RATIO: 0.77
BACTERIA #/AREA URNS AUTO: NORMAL /HPF
BASOPHILS # BLD AUTO: 0.05 K/UL (ref 0–0.2)
BASOPHILS NFR BLD: 1.5 % (ref 0–1.9)
BILIRUB SERPL-MCNC: 0.2 MG/DL (ref 0.1–1)
BILIRUB UR QL STRIP: NEGATIVE
BNP SERPL-MCNC: 11 PG/ML (ref 0–99)
BSA FOR ECHO PROCEDURE: 1.54 M2
BUN SERPL-MCNC: 12 MG/DL (ref 8–23)
CALCIUM SERPL-MCNC: 7.1 MG/DL (ref 8.7–10.5)
CHLORIDE SERPL-SCNC: 115 MMOL/L (ref 95–110)
CLARITY UR REFRACT.AUTO: CLEAR
CO2 SERPL-SCNC: 14 MMOL/L (ref 23–29)
COLOR UR AUTO: YELLOW
CREAT SERPL-MCNC: 0.6 MG/DL (ref 0.5–1.4)
CV ECHO LV RWT: 0.31 CM
CV STRESS BASE HR: 87 BPM
DIASTOLIC BLOOD PRESSURE: 79 MMHG
DIFFERENTIAL METHOD BLD: ABNORMAL
DOP CALC AO PEAK VEL: 1.3 M/S
DOP CALC AO VTI: 26.6 CM
DOP CALC LVOT AREA: 2.8 CM2
DOP CALC LVOT DIAMETER: 1.9 CM
DOP CALC LVOT PEAK VEL: 1 M/S
DOP CALC LVOT STROKE VOLUME: 58.7 CM3
DOP CALC RVOT AREA: 3.59 CM2
DOP CALC RVOT DIAMETER: 2.14 CM
DOP CALCLVOT PEAK VEL VTI: 20.7 CM
E WAVE DECELERATION TIME: 219.09 MS
E/A RATIO: 0.78
E/E' RATIO: 9.06 M/S
ECHO EF ESTIMATED: 62 %
ECHO LV POSTERIOR WALL: 0.5 CM (ref 0.6–1.1)
EOSINOPHIL # BLD AUTO: 0.2 K/UL (ref 0–0.5)
EOSINOPHIL NFR BLD: 4.5 % (ref 0–8)
ERYTHROCYTE [DISTWIDTH] IN BLOOD BY AUTOMATED COUNT: 12.9 % (ref 11.5–14.5)
EST. GFR  (NO RACE VARIABLE): >60 ML/MIN/1.73 M^2
FRACTIONAL SHORTENING: 31.3 % (ref 28–44)
GLUCOSE SERPL-MCNC: 75 MG/DL (ref 70–110)
GLUCOSE UR QL STRIP: NEGATIVE
HCT VFR BLD AUTO: 35.3 % (ref 37–48.5)
HCV AB SERPL QL IA: NORMAL
HGB BLD-MCNC: 11.5 G/DL (ref 12–16)
HGB UR QL STRIP: NEGATIVE
HIV 1+2 AB+HIV1 P24 AG SERPL QL IA: NORMAL
HYALINE CASTS UR QL AUTO: 1 /LPF
IMM GRANULOCYTES # BLD AUTO: 0.02 K/UL (ref 0–0.04)
IMM GRANULOCYTES NFR BLD AUTO: 0.6 % (ref 0–0.5)
INTERVENTRICULAR SEPTUM: 0.9 CM (ref 0.6–1.1)
IVC DIAMETER: 1.01 CM
IVRT: 82.78 MS
KETONES UR QL STRIP: NEGATIVE
LA MAJOR: 5.49 CM
LA MINOR: 5.62 CM
LA WIDTH: 3.93 CM
LEFT ATRIUM SIZE: 2.59 CM
LEFT ATRIUM VOLUME INDEX MOD: 28.9 ML/M2
LEFT ATRIUM VOLUME INDEX: 31.2 ML/M2
LEFT ATRIUM VOLUME MOD: 44.52 ML
LEFT ATRIUM VOLUME: 48.05 CM3
LEFT INTERNAL DIMENSION IN SYSTOLE: 2.2 CM (ref 2.1–4)
LEFT VENTRICLE DIASTOLIC VOLUME INDEX: 26.6 ML/M2
LEFT VENTRICLE DIASTOLIC VOLUME: 40.97 ML
LEFT VENTRICLE MASS INDEX: 35.3 G/M2
LEFT VENTRICLE SYSTOLIC VOLUME INDEX: 10.2 ML/M2
LEFT VENTRICLE SYSTOLIC VOLUME: 15.68 ML
LEFT VENTRICULAR INTERNAL DIMENSION IN DIASTOLE: 3.2 CM (ref 3.5–6)
LEFT VENTRICULAR MASS: 54.3 G
LEUKOCYTE ESTERASE UR QL STRIP: ABNORMAL
LV LATERAL E/E' RATIO: 8.56
LV SEPTAL E/E' RATIO: 9.63
LYMPHOCYTES # BLD AUTO: 1.1 K/UL (ref 1–4.8)
LYMPHOCYTES NFR BLD: 34.3 % (ref 18–48)
MCH RBC QN AUTO: 33.3 PG (ref 27–31)
MCHC RBC AUTO-ENTMCNC: 32.6 G/DL (ref 32–36)
MCV RBC AUTO: 102 FL (ref 82–98)
MICROSCOPIC COMMENT: NORMAL
MONOCYTES # BLD AUTO: 0.4 K/UL (ref 0.3–1)
MONOCYTES NFR BLD: 10.8 % (ref 4–15)
MV A" WAVE DURATION": 82.78 MS
MV PEAK A VEL: 0.99 M/S
MV PEAK E VEL: 0.77 M/S
NEUTROPHILS # BLD AUTO: 1.6 K/UL (ref 1.8–7.7)
NEUTROPHILS NFR BLD: 48.3 % (ref 38–73)
NITRITE UR QL STRIP: NEGATIVE
NRBC BLD-RTO: 0 /100 WBC
OHS CV CPX 1 MINUTE RECOVERY HEART RATE: 118 BPM
OHS CV CPX 85 PERCENT MAX PREDICTED HEART RATE MALE: 130
OHS CV CPX ESTIMATED METS: 7
OHS CV CPX MAX PREDICTED HEART RATE: 153
OHS CV CPX PATIENT IS FEMALE: 1
OHS CV CPX PATIENT IS MALE: 0
OHS CV CPX PEAK DIASTOLIC BLOOD PRESSURE: 76 MMHG
OHS CV CPX PEAK HEAR RATE: 153 BPM
OHS CV CPX PEAK RATE PRESSURE PRODUCT: ABNORMAL
OHS CV CPX PEAK SYSTOLIC BLOOD PRESSURE: 143 MMHG
OHS CV CPX PERCENT MAX PREDICTED HEART RATE ACHIEVED: 104
OHS CV CPX RATE PRESSURE PRODUCT PRESENTING: 9831
OHS CV RV/LV RATIO: 0.94 CM
OHS QRS DURATION: 70 MS
OHS QRS DURATION: 74 MS
OHS QTC CALCULATION: 445 MS
OHS QTC CALCULATION: 448 MS
PH UR STRIP: 6 [PH] (ref 5–8)
PISA TR MAX VEL: 2.41 M/S
PLATELET # BLD AUTO: 138 K/UL (ref 150–450)
PMV BLD AUTO: 9.2 FL (ref 9.2–12.9)
POTASSIUM SERPL-SCNC: 3.8 MMOL/L (ref 3.5–5.1)
PROT SERPL-MCNC: 5.8 G/DL (ref 6–8.4)
PROT UR QL STRIP: NEGATIVE
PULM VEIN A" WAVE DURATION": 82.78 MS
PULM VEIN S/D RATIO: 2.13
PULMONIC VEIN PEAK A VELOCITY: 0.3 M/S
PV PEAK D VEL: 0.24 M/S
PV PEAK S VEL: 0.51 M/S
RA MAJOR: 4.13 CM
RA PRESSURE ESTIMATED: 3 MMHG
RA WIDTH: 3.47 CM
RBC # BLD AUTO: 3.45 M/UL (ref 4–5.4)
RBC #/AREA URNS AUTO: 1 /HPF (ref 0–4)
RIGHT ATRIAL AREA: 11.3 CM2
RIGHT VENTRICLE DIASTOLIC BASEL DIMENSION: 3 CM
RV TB RVSP: 5 MMHG
RV TISSUE DOPPLER FREE WALL SYSTOLIC VELOCITY 1 (APICAL 4 CHAMBER VIEW): 15.88 CM/S
SINUS: 2.78 CM
SODIUM SERPL-SCNC: 141 MMOL/L (ref 136–145)
SP GR UR STRIP: 1.01 (ref 1–1.03)
SQUAMOUS #/AREA URNS AUTO: 2 /HPF
STJ: 1.98 CM
STRESS ECHO POST EXERCISE DUR MIN: 4 MINUTES
STRESS ECHO POST EXERCISE DUR SEC: 44 SECONDS
SYSTOLIC BLOOD PRESSURE: 113 MMHG
TDI LATERAL: 0.09 M/S
TDI SEPTAL: 0.08 M/S
TDI: 0.09 M/S
TR MAX PG: 23 MMHG
TRICUSPID ANNULAR PLANE SYSTOLIC EXCURSION: 2.19 CM
TROPONIN I SERPL DL<=0.01 NG/ML-MCNC: <0.006 NG/ML (ref 0–0.03)
TV PEAK GRADIENT: 23 MMHG
TV REST PULMONARY ARTERY PRESSURE: 26 MMHG
URN SPEC COLLECT METH UR: ABNORMAL
WBC # BLD AUTO: 3.32 K/UL (ref 3.9–12.7)
WBC #/AREA URNS AUTO: 2 /HPF (ref 0–5)
Z-SCORE OF LEFT VENTRICULAR DIMENSION IN END DIASTOLE: -3.35
Z-SCORE OF LEFT VENTRICULAR DIMENSION IN END SYSTOLE: -1.84

## 2024-10-17 PROCEDURE — 83880 ASSAY OF NATRIURETIC PEPTIDE: CPT

## 2024-10-17 PROCEDURE — G0378 HOSPITAL OBSERVATION PER HR: HCPCS

## 2024-10-17 PROCEDURE — 84484 ASSAY OF TROPONIN QUANT: CPT

## 2024-10-17 PROCEDURE — 84484 ASSAY OF TROPONIN QUANT: CPT | Mod: 91 | Performed by: PHYSICIAN ASSISTANT

## 2024-10-17 PROCEDURE — 25000003 PHARM REV CODE 250: Performed by: PHYSICIAN ASSISTANT

## 2024-10-17 PROCEDURE — 80053 COMPREHEN METABOLIC PANEL: CPT

## 2024-10-17 PROCEDURE — 87389 HIV-1 AG W/HIV-1&-2 AB AG IA: CPT | Performed by: EMERGENCY MEDICINE

## 2024-10-17 PROCEDURE — 63600175 PHARM REV CODE 636 W HCPCS: Performed by: PHYSICIAN ASSISTANT

## 2024-10-17 PROCEDURE — 25000003 PHARM REV CODE 250

## 2024-10-17 PROCEDURE — 63600175 PHARM REV CODE 636 W HCPCS

## 2024-10-17 PROCEDURE — 85025 COMPLETE CBC W/AUTO DIFF WBC: CPT

## 2024-10-17 PROCEDURE — 25000242 PHARM REV CODE 250 ALT 637 W/ HCPCS

## 2024-10-17 PROCEDURE — 93005 ELECTROCARDIOGRAM TRACING: CPT

## 2024-10-17 PROCEDURE — 86803 HEPATITIS C AB TEST: CPT | Performed by: EMERGENCY MEDICINE

## 2024-10-17 PROCEDURE — 93010 ELECTROCARDIOGRAM REPORT: CPT | Mod: ,,, | Performed by: INTERNAL MEDICINE

## 2024-10-17 PROCEDURE — 81001 URINALYSIS AUTO W/SCOPE: CPT

## 2024-10-17 RX ORDER — IBUPROFEN 200 MG
400 TABLET ORAL DAILY PRN
COMMUNITY

## 2024-10-17 RX ORDER — PROMETHAZINE HYDROCHLORIDE 25 MG/1
25 TABLET ORAL DAILY PRN
Status: DISCONTINUED | OUTPATIENT
Start: 2024-10-17 | End: 2024-10-17 | Stop reason: HOSPADM

## 2024-10-17 RX ORDER — PROCHLORPERAZINE EDISYLATE 5 MG/ML
10 INJECTION INTRAMUSCULAR; INTRAVENOUS
Status: DISCONTINUED | OUTPATIENT
Start: 2024-10-17 | End: 2024-10-17

## 2024-10-17 RX ORDER — MORPHINE SULFATE 4 MG/ML
4 INJECTION, SOLUTION INTRAMUSCULAR; INTRAVENOUS EVERY 4 HOURS PRN
Status: DISCONTINUED | OUTPATIENT
Start: 2024-10-17 | End: 2024-10-17 | Stop reason: HOSPADM

## 2024-10-17 RX ORDER — ACETAMINOPHEN 325 MG/1
650 TABLET ORAL EVERY 8 HOURS PRN
Status: DISCONTINUED | OUTPATIENT
Start: 2024-10-17 | End: 2024-10-17 | Stop reason: HOSPADM

## 2024-10-17 RX ORDER — NITROGLYCERIN 0.4 MG/1
0.4 TABLET SUBLINGUAL
Status: COMPLETED | OUTPATIENT
Start: 2024-10-17 | End: 2024-10-17

## 2024-10-17 RX ORDER — MORPHINE SULFATE 4 MG/ML
4 INJECTION, SOLUTION INTRAMUSCULAR; INTRAVENOUS ONCE
Status: DISCONTINUED | OUTPATIENT
Start: 2024-10-17 | End: 2024-10-17

## 2024-10-17 RX ORDER — ONDANSETRON HYDROCHLORIDE 2 MG/ML
4 INJECTION, SOLUTION INTRAVENOUS
Status: DISCONTINUED | OUTPATIENT
Start: 2024-10-17 | End: 2024-10-17

## 2024-10-17 RX ORDER — MORPHINE SULFATE 4 MG/ML
4 INJECTION, SOLUTION INTRAMUSCULAR; INTRAVENOUS
Status: COMPLETED | OUTPATIENT
Start: 2024-10-17 | End: 2024-10-17

## 2024-10-17 RX ORDER — ALUMINUM HYDROXIDE, MAGNESIUM HYDROXIDE, AND SIMETHICONE 1200; 120; 1200 MG/30ML; MG/30ML; MG/30ML
5 SUSPENSION ORAL
Status: COMPLETED | OUTPATIENT
Start: 2024-10-17 | End: 2024-10-17

## 2024-10-17 RX ORDER — HYDROXYZINE HYDROCHLORIDE 25 MG/1
25 TABLET, FILM COATED ORAL NIGHTLY PRN
Status: DISCONTINUED | OUTPATIENT
Start: 2024-10-17 | End: 2024-10-17 | Stop reason: HOSPADM

## 2024-10-17 RX ORDER — SODIUM CHLORIDE 0.9 % (FLUSH) 0.9 %
10 SYRINGE (ML) INJECTION
Status: DISCONTINUED | OUTPATIENT
Start: 2024-10-17 | End: 2024-10-17 | Stop reason: HOSPADM

## 2024-10-17 RX ORDER — ATORVASTATIN CALCIUM 20 MG/1
20 TABLET, FILM COATED ORAL NIGHTLY
Status: DISCONTINUED | OUTPATIENT
Start: 2024-10-17 | End: 2024-10-17 | Stop reason: HOSPADM

## 2024-10-17 RX ORDER — TALC
6 POWDER (GRAM) TOPICAL NIGHTLY PRN
Status: DISCONTINUED | OUTPATIENT
Start: 2024-10-17 | End: 2024-10-17 | Stop reason: HOSPADM

## 2024-10-17 RX ORDER — LORAZEPAM 0.5 MG/1
0.5 TABLET ORAL NIGHTLY
Status: DISCONTINUED | OUTPATIENT
Start: 2024-10-17 | End: 2024-10-17 | Stop reason: HOSPADM

## 2024-10-17 RX ORDER — LEVOTHYROXINE SODIUM 88 UG/1
88 TABLET ORAL EVERY MORNING
Status: DISCONTINUED | OUTPATIENT
Start: 2024-10-18 | End: 2024-10-17 | Stop reason: HOSPADM

## 2024-10-17 RX ORDER — LORAZEPAM 2 MG/ML
1 INJECTION INTRAMUSCULAR
Status: DISCONTINUED | OUTPATIENT
Start: 2024-10-17 | End: 2024-10-17

## 2024-10-17 RX ORDER — LIDOCAINE HYDROCHLORIDE 20 MG/ML
5 SOLUTION OROPHARYNGEAL
Status: COMPLETED | OUTPATIENT
Start: 2024-10-17 | End: 2024-10-17

## 2024-10-17 RX ORDER — KETOROLAC TROMETHAMINE 30 MG/ML
15 INJECTION, SOLUTION INTRAMUSCULAR; INTRAVENOUS
Status: COMPLETED | OUTPATIENT
Start: 2024-10-17 | End: 2024-10-17

## 2024-10-17 RX ORDER — CETIRIZINE HYDROCHLORIDE 10 MG/1
10 TABLET ORAL DAILY PRN
COMMUNITY

## 2024-10-17 RX ORDER — ACETAMINOPHEN 500 MG
1000 TABLET ORAL
Status: COMPLETED | OUTPATIENT
Start: 2024-10-17 | End: 2024-10-17

## 2024-10-17 RX ORDER — LISINOPRIL AND HYDROCHLOROTHIAZIDE 10; 12.5 MG/1; MG/1
1 TABLET ORAL EVERY MORNING
Status: DISCONTINUED | OUTPATIENT
Start: 2024-10-17 | End: 2024-10-17 | Stop reason: HOSPADM

## 2024-10-17 RX ADMIN — MORPHINE SULFATE 4 MG: 4 INJECTION INTRAVENOUS at 04:10

## 2024-10-17 RX ADMIN — KETOROLAC TROMETHAMINE 15 MG: 30 INJECTION, SOLUTION INTRAMUSCULAR; INTRAVENOUS at 10:10

## 2024-10-17 RX ADMIN — LISINOPRIL AND HYDROCHLOROTHIAZIDE TABLETS 1 TABLET: 10; 12.5 TABLET ORAL at 01:10

## 2024-10-17 RX ADMIN — ALUMINUM HYDROXIDE, MAGNESIUM HYDROXIDE, AND SIMETHICONE 5 ML: 1200; 120; 1200 SUSPENSION ORAL at 03:10

## 2024-10-17 RX ADMIN — NITROGLYCERIN 0.4 MG: 0.4 TABLET, ORALLY DISINTEGRATING SUBLINGUAL at 08:10

## 2024-10-17 RX ADMIN — LIDOCAINE HYDROCHLORIDE 5 ML: 20 SOLUTION ORAL at 03:10

## 2024-10-17 RX ADMIN — MORPHINE SULFATE 4 MG: 4 INJECTION INTRAVENOUS at 10:10

## 2024-10-17 RX ADMIN — ACETAMINOPHEN 1000 MG: 500 TABLET ORAL at 08:10

## 2024-10-17 NOTE — ED NOTES
Assumed care of the patient. Pt in hospital gown, side rails up X2, bed low and locked, and call light is placed within reach. No family/visitors at bedside at this time. Pt denies any complaints or needs. Whiteboard updated with patient's plan of care.

## 2024-10-17 NOTE — PHARMACY MED REC
"Admission Medication History     The home medication history was taken by Lilibeth Nicholson.    You may go to "Admission" then "Reconcile Home Medications" tabs to review and/or act upon these items.     The home medication list has been updated by the Pharmacy department.   Please read ALL comments highlighted in yellow.   Please address this information as you see fit.    Feel free to contact us if you have any questions or require assistance.      The medications listed below were removed from the home medication list. Please reorder if appropriate:  Patient reports no longer taking the following medication(s):  ACYCLOVIR 400 MG  BISMUTH  MG  LORATADINE 10 MG      Medications listed below were obtained from: Patient/family and Analytic software- [employer]  Current Outpatient Medications on File Prior to Encounter   Medication Sig    brimonidine tartrate (LUMIFY OPHT) Place 1 drop into both eyes daily as needed (redness).    cetirizine (ZYRTEC) 10 MG tablet Take 10 mg by mouth daily as needed for Allergies.    hydrOXYzine HCL (ATARAX) 25 MG tablet Take 1 tablet (25 mg total) by mouth daily as needed for Anxiety. (Patient taking differently: Take 25 mg by mouth nightly as needed for Anxiety.)    ibuprofen (ADVIL) 200 MG tablet Take 400 mg by mouth daily as needed (headache).    levothyroxine (SYNTHROID) 88 MCG tablet TAKE 1 TABLET BY MOUTH EVERY DAY (Patient taking differently: Take 88 mcg by mouth every morning.)    lisinopriL-hydrochlorothiazide (PRINZIDE,ZESTORETIC) 10-12.5 mg per tablet Take 1 tablet by mouth once daily. (Patient taking differently: Take 1 tablet by mouth every morning.)    morphine (MSIR) 15 MG tablet Take 1 tablet (15 mg total) by mouth every 6 (six) hours as needed for Pain.    promethazine (PHENERGAN) 25 MG tablet TAKE 1 TABLET BY MOUTH EVERY 6 HOURS AS NEEDED FOR NAUSEA (Patient taking differently: Take 25 mg by mouth daily as needed for Nausea.)    psyllium (METAMUCIL SUGAR FREE) Powd " Take by mouth. Place 1 tbsp into beverage and drink daily    rosuvastatin (CRESTOR) 5 MG tablet TAKE 1 TABLET BY MOUTH EVERY DAY (Patient taking differently: Take 5 mg by mouth every evening.)    temazepam (RESTORIL) 15 mg Cap Take 2 capsules (30 mg total) by mouth nightly as needed (sleep disturbance). (Patient taking differently: Take 15 mg by mouth 2 (two) times a day.)    BIO COMPLETE 3 Take 1 tablet by mouth Daily. medication name: BIO COMPLETE 3         Lilibeth Nicholson  EXT [id number]                  .

## 2024-10-17 NOTE — ED PROVIDER NOTES
"Encounter Date: 10/17/2024       History     Chief Complaint   Patient presents with    Chest Pain     Arrived via [facility] from home for Chest Pain x 4 hours. Given 324 ASA in route. Reports CP subsided on arrival.     70 y/o F with PMHx of HTN, hypothyroidism, diverticulitis and anxiety presents with c/o chest pain. Pt reports that she began to experience 10/10, centralized, non radiating chest pain at 2230 last night. She says that she "felt like something was sitting on her chest" and was unable to move due to the pain. This morning she called EMS due to the severity of the pain but says that she feels her pain has improved because she "is not alone" and is "in a safe environment" -- she notes she is still experiencing some chest tightness. She also endorses some nausea, shortness of breath and abdominal pain during this time period and attempted to manage symptoms with phenergan which was ineffective. She denies cough, vomiting, leg swelling or any other complaints at this time. Pt reports that she had pancreatitis a few weeks ago. Pt notes a history of anxiety and says that she currently has a lot of stressors in her life and has been unable to sleep. She says that she normally takes hydroxyzine for anxiety and denies recent alcohol use. She had a negative stress test a few years ago.     The history is provided by the patient.     Review of patient's allergies indicates:   Allergen Reactions    Codeine     Dilaudid [hydromorphone] Other (See Comments)     hallucinations    Hydrocodone Hives, Nausea And Vomiting and Other (See Comments)     hallucinations    Latex Itching    Tramadol     Zofran [ondansetron hcl (pf)]      Past Medical History:   Diagnosis Date    Diverticulitis     Edema     Hypertension     Spinal stenosis     Thyroid disease      Past Surgical History:   Procedure Laterality Date    APPENDECTOMY      BREAST SURGERY  2023     SECTION, CLASSIC      COLON SURGERY  2003    COSMETIC " SURGERY  2023    LIPOSUCTION  01/05/2023    NASAL RECONSTRUCTION      SIGMOIDECTOMY      TONSILLECTOMY      TOTAL REDUCTION MAMMOPLASTY Bilateral 2023    and lift     Family History   Problem Relation Name Age of Onset    No Known Problems Mother      COPD Father Gerry Hamm     Breast cancer Sister  48    Arthritis Maternal Grandmother Vaishali Wilson     Cancer Sister Angie Handy-Breast cancer      Social History     Tobacco Use    Smoking status: Never     Passive exposure: Never    Smokeless tobacco: Never   Substance Use Topics    Alcohol use: Yes     Alcohol/week: 10.0 standard drinks of alcohol     Types: 10 Glasses of wine per week    Drug use: No     Review of Systems   Constitutional: Negative.    HENT: Negative.     Eyes: Negative.    Respiratory:  Positive for shortness of breath. Negative for cough.    Cardiovascular:  Positive for chest pain. Negative for leg swelling.   Gastrointestinal:  Positive for abdominal pain and nausea. Negative for vomiting.   Endocrine: Negative.    Genitourinary: Negative.    Musculoskeletal: Negative.    Skin: Negative.    Allergic/Immunologic: Negative.    Neurological: Negative.    Hematological: Negative.    Psychiatric/Behavioral: Negative.         Physical Exam     Initial Vitals [10/17/24 0725]   BP Pulse Resp Temp SpO2   110/82 90 18 97.8 °F (36.6 °C) 98 %      MAP       --         Physical Exam    Nursing note and vitals reviewed.  Constitutional: She appears well-developed and well-nourished. No distress.   HENT:   Head: Normocephalic and atraumatic.   Cardiovascular:  Normal rate, regular rhythm and normal heart sounds.           Pulmonary/Chest: No respiratory distress.   Abdominal: Abdomen is soft.   Musculoskeletal:         General: No edema.     Neurological: She is alert and oriented to person, place, and time.   Skin: Skin is warm and dry.   Psychiatric:   Patient appears very depressed and anxious, some splitting tendencies noted         ED Course    Procedures  Labs Reviewed   CBC W/ AUTO DIFFERENTIAL - Abnormal       Result Value    WBC 3.32 (*)     RBC 3.45 (*)     Hemoglobin 11.5 (*)     Hematocrit 35.3 (*)      (*)     MCH 33.3 (*)     MCHC 32.6      RDW 12.9      Platelets 138 (*)     MPV 9.2      Immature Granulocytes 0.6 (*)     Gran # (ANC) 1.6 (*)     Immature Grans (Abs) 0.02      Lymph # 1.1      Mono # 0.4      Eos # 0.2      Baso # 0.05      nRBC 0      Gran % 48.3      Lymph % 34.3      Mono % 10.8      Eosinophil % 4.5      Basophil % 1.5      Differential Method Automated     COMPREHENSIVE METABOLIC PANEL - Abnormal    Sodium 141      Potassium 3.8      Chloride 115 (*)     CO2 14 (*)     Glucose 75      BUN 12      Creatinine 0.6      Calcium 7.1 (*)     Total Protein 5.8 (*)     Albumin 3.2 (*)     Total Bilirubin 0.2      Alkaline Phosphatase 61      AST 45 (*)     ALT 32      eGFR >60.0      Anion Gap 12     HIV 1 / 2 ANTIBODY    HIV 1/2 Ag/Ab Non-reactive      Narrative:     Release to patient->Immediate   HEPATITIS C ANTIBODY    Hepatitis C Ab Non-reactive      Narrative:     Release to patient->Immediate   B-TYPE NATRIURETIC PEPTIDE    BNP 11     TROPONIN I    Troponin I <0.006     URINALYSIS, REFLEX TO URINE CULTURE   TROPONIN I   TROPONIN I          Imaging Results              X-Ray Chest AP Portable (Final result)  Result time 10/17/24 08:54:22      Final result by Edis Gonzáles MD (10/17/24 08:54:22)                   Impression:      Atelectasis versus early consolidation left lower lobe.  Recommend dedicated PA and lateral radiographs for further evaluation.      Electronically signed by: Edis Gonzáles MD  Date:    10/17/2024  Time:    08:54               Narrative:    EXAMINATION:  XR CHEST AP PORTABLE    CLINICAL HISTORY:  chest pain;    TECHNIQUE:  Single views of the chest were performed.    COMPARISON:  Chest radiograph dated April 14, 2023    FINDINGS:  The trachea and cardiomediastinal silhouette are within normal  limits.  There is no evidence of pleural effusions, pneumothoraces or consolidations.  Atelectasis versus early consolidation noted in the left lower lobe.  Osseous structures demonstrate no evidence for acute fractures or dislocations.  Probable os ossific bodies again projected over the right shoulder.                                       Medications - No data to display  Medical Decision Making  68 y/o F with PMHx of HTN, hypothyroidism, diverticulitis and anxiety presents with c/o chest pain. Differential diagnoses include but are not limited to ACS vs pericarditis vs CHF vs GERD vs esophagitis PE vs aortic dissection vs generalized anxiety disorder. Suspicion for aortic dissection and PE low at this time. EKG negative for ischemia. Sublingual NTG and acetaminophen given in the ED, pt became briefly hypotensive after administration (systolic 90s) but remained stable otherwise. BNP negative, troponin negative. CBC showing anemia (Hgb 11.5). CXR showing atelectasis vs early consolidation in the left lower lobe. Will admit patient to [facility] for observation and stress test for further risk stratification. Psychiatry consult placed for patient's anxiety and depression -- they will evaluate patient while she is admitted.     Amount and/or Complexity of Data Reviewed  External Data Reviewed: ECG and notes.  Labs: ordered. Decision-making details documented in ED Course.     Details: CBC showing anemia, BNP neg, trop neg. CMP unremarkable. UA pending.   Radiology: ordered.  ECG/medicine tests: ordered and independent interpretation performed. Decision-making details documented in ED Course.     Details: NSR, HR 83. All intervals normal. No STEMI.   Discussion of management or test interpretation with external provider(s): Case discussed with [facility] who will admit patient for observation. Case discussed with psychiatry who will evaluate patient while hospitalized.     Risk  OTC drugs.  Prescription drug management.                ED Course as of 10/17/24 0947   Thu Oct 17, 2024   0737 EKG 12-lead  In his rhythm, Q-waves V2 through V4, lead 3 as well.  Normal intervals.  No acute ischemia.  When compared to prior in 2023, largely unchanged [AC]   0925 Troponin I: <0.006 [AC]   0925 Hemoglobin(!): 11.5 [AC]      ED Course User Index  [AC] Frank Sun DO                           Clinical Impression:  Final diagnoses:  [R07.9] Chest pain (Primary)  [F32.A] Depression, unspecified depression type                 Tessa Gunderson DO  Resident  10/17/24 1031

## 2024-10-17 NOTE — PROGRESS NOTES
"ED Observation Unit  Progress Note      HPI   68 y/o F with PMHx of HTN, hypothyroidism, diverticulitis and anxiety presents with c/o chest pain. Pt reports that she began to experience 10/10, centralized, non radiating chest pain at 2230 last night. She says that she "felt like something was sitting on her chest" and was unable to move due to the pain. This morning she called EMS due to the severity of the pain but says that she feels her pain has improved because she "is not alone" and is "in a safe environment" -- she notes she is still experiencing some chest tightness. She also endorses some nausea, shortness of breath and abdominal pain during this time period and attempted to manage symptoms with phenergan which was ineffective. She denies cough, vomiting, leg swelling or any other complaints at this time. Pt reports that she had pancreatitis a few weeks ago. Pt notes a history of anxiety and says that she currently has a lot of stressors in her life and has been unable to sleep. She says that she normally takes hydroxyzine for anxiety and denies recent alcohol use. She had a negative stress test a few years ago.      The history is provided by the patient.      Initial troponin in the ED was negative.  Psychiatry was consulted by ED team, but did not meet PEC criteria.  Patient was placed in observation for further cardiac workup as well as full evaluation by Psychiatry for depression.  Chest pain improved with ED team, but on my evaluation patient complaining that the pain has returned.  She states that the nitroglycerin did not help her pain.  She got the most relief with morphine and another medication given in the ED that she can not recall.  Per chart, patient received Toradol as well as Tylenol.     Interval History   Resting comfortably. Denies any medical complaints.     PMHx   Past Medical History:   Diagnosis Date    Diverticulitis     Edema     Hypertension     Spinal stenosis     Thyroid disease  " "     Past Surgical History:   Procedure Laterality Date    APPENDECTOMY      BREAST SURGERY  2023     SECTION, CLASSIC      COLON SURGERY  2003    COSMETIC SURGERY      LIPOSUCTION  2023    NASAL RECONSTRUCTION      SIGMOIDECTOMY      TONSILLECTOMY      TOTAL REDUCTION MAMMOPLASTY Bilateral     and lift        Family Hx   Family History   Problem Relation Name Age of Onset    No Known Problems Mother      COPD Father Gerry Hamm     Breast cancer Sister  48    Arthritis Maternal Grandmother Vaishali Wilson     Cancer Sister Angie Handy-Breast cancer         Social Hx   Social History     Socioeconomic History    Marital status:    Tobacco Use    Smoking status: Never     Passive exposure: Never    Smokeless tobacco: Never   Substance and Sexual Activity    Alcohol use: Yes     Alcohol/week: 10.0 standard drinks of alcohol     Types: 10 Glasses of wine per week    Drug use: No    Sexual activity: Yes     Partners: Male     Birth control/protection: None     Comment: I am 69 years old     Social Drivers of Health     Stress: Stress Concern Present (2020)    [employer] - Occupational Stress Questionnaire     Feeling of Stress : To some extent        Vital Signs   Vitals:    10/17/24 0930 10/17/24 1007 10/17/24 1157 10/17/24 1616   BP: (!) 107/57 110/72 126/77    Pulse: 81 74 78    Resp: 20 19 18 18   Temp:   98 °F (36.7 °C)    TempSrc:   Oral    SpO2: 95% 100% 96%    Weight:   54.4 kg (120 lb)    Height:   5' 2" (1.575 m)         Review of Systems  Review of Systems   Cardiovascular:  Positive for chest pain.       Brief Physical Exam/Reassessment   Physical Exam  Constitutional:       General: She is not in acute distress.     Appearance: Normal appearance. She is not toxic-appearing or diaphoretic.   HENT:      Head: Normocephalic and atraumatic.      Mouth/Throat:      Mouth: Mucous membranes are moist.   Eyes:      General: No scleral " icterus.     Extraocular Movements: Extraocular movements intact.      Conjunctiva/sclera: Conjunctivae normal.      Pupils: Pupils are equal, round, and reactive to light.   Cardiovascular:      Rate and Rhythm: Normal rate and regular rhythm.      Pulses: Normal pulses.      Heart sounds: Normal heart sounds.   Pulmonary:      Effort: Pulmonary effort is normal. No respiratory distress.      Breath sounds: Normal breath sounds. No wheezing or rales.   Abdominal:      General: Abdomen is flat. There is no distension.      Palpations: Abdomen is soft.      Tenderness: There is no abdominal tenderness.   Musculoskeletal:         General: No swelling or tenderness. Normal range of motion.      Cervical back: Normal range of motion and neck supple. No rigidity or tenderness.   Skin:     General: Skin is warm and dry.   Neurological:      Mental Status: She is alert and oriented to person, place, and time. Mental status is at baseline.      Sensory: No sensory deficit.      Motor: No weakness.         Labs/Imaging   Labs Reviewed   CBC W/ AUTO DIFFERENTIAL - Abnormal       Result Value    WBC 3.32 (*)     RBC 3.45 (*)     Hemoglobin 11.5 (*)     Hematocrit 35.3 (*)      (*)     MCH 33.3 (*)     MCHC 32.6      RDW 12.9      Platelets 138 (*)     MPV 9.2      Immature Granulocytes 0.6 (*)     Gran # (ANC) 1.6 (*)     Immature Grans (Abs) 0.02      Lymph # 1.1      Mono # 0.4      Eos # 0.2      Baso # 0.05      nRBC 0      Gran % 48.3      Lymph % 34.3      Mono % 10.8      Eosinophil % 4.5      Basophil % 1.5      Differential Method Automated     COMPREHENSIVE METABOLIC PANEL - Abnormal    Sodium 141      Potassium 3.8      Chloride 115 (*)     CO2 14 (*)     Glucose 75      BUN 12      Creatinine 0.6      Calcium 7.1 (*)     Total Protein 5.8 (*)     Albumin 3.2 (*)     Total Bilirubin 0.2      Alkaline Phosphatase 61      AST 45 (*)     ALT 32      eGFR >60.0      Anion Gap 12     URINALYSIS, REFLEX TO URINE  CULTURE - Abnormal    Specimen UA Urine, Clean Catch      Color, UA Yellow      Appearance, UA Clear      pH, UA 6.0      Specific Gravity, UA 1.010      Protein, UA Negative      Glucose, UA Negative      Ketones, UA Negative      Bilirubin (UA) Negative      Occult Blood UA Negative      Nitrite, UA Negative      Leukocytes, UA 2+ (*)     Narrative:     Specimen Source->Urine   HIV 1 / 2 ANTIBODY    HIV 1/2 Ag/Ab Non-reactive      Narrative:     Release to patient->Immediate   HEPATITIS C ANTIBODY    Hepatitis C Ab Non-reactive      Narrative:     Release to patient->Immediate   B-TYPE NATRIURETIC PEPTIDE    BNP 11     TROPONIN I    Troponin I <0.006     TROPONIN I    Troponin I <0.006     TROPONIN I    Troponin I <0.006     URINALYSIS MICROSCOPIC    RBC, UA 1      WBC, UA 2      Bacteria Occasional      Squam Epithel, UA 2      Hyaline Casts, UA 1      Microscopic Comment SEE COMMENT      Narrative:     Specimen Source->Urine      Imaging Results              X-Ray Chest AP Portable (Final result)  Result time 10/17/24 08:54:22      Final result by Edis Gonzáles MD (10/17/24 08:54:22)                   Impression:      Atelectasis versus early consolidation left lower lobe.  Recommend dedicated PA and lateral radiographs for further evaluation.      Electronically signed by: Edis Gonzáles MD  Date:    10/17/2024  Time:    08:54               Narrative:    EXAMINATION:  XR CHEST AP PORTABLE    CLINICAL HISTORY:  chest pain;    TECHNIQUE:  Single views of the chest were performed.    COMPARISON:  Chest radiograph dated April 14, 2023    FINDINGS:  The trachea and cardiomediastinal silhouette are within normal limits.  There is no evidence of pleural effusions, pneumothoraces or consolidations.  Atelectasis versus early consolidation noted in the left lower lobe.  Osseous structures demonstrate no evidence for acute fractures or dislocations.  Probable os ossific bodies again projected over the right shoulder.                                        I reviewed all labs, imaging, EKGs.     Plan   Chest pain  - Initial trop negative. Trop negative x3.  - received ASA with EMS  - Exercise stress echo ordered  - Telemetry  - prn analgesics.  Will try GI cocktail as well.      Depression  - Does not meet PEC criteria at this time per ED provider  - Psychiatry will not be able to see patient until tomorrow AM for official recommendations. I discussed this with the patient. She feels reassured her cardiac work-up is negative. She does not want to stay overnight to speak with psychiatry and would like to follow-up as outpatient which I feel is appropriate.     I have discussed this case with JIE Gross.

## 2024-10-17 NOTE — ED TRIAGE NOTES
"Essence Tapia, a 69 y.o. female presents to the ED w/ complaint of 10/10 chest pain. Patient endorses midsternal chest pain that started last night. Patient describes pain as chest pressure, "feels like something is sitting on my chest". Denies SOB.     Triage note:  Chief Complaint   Patient presents with    Chest Pain     Arrived via [facility] 10 from home for Chest Pain x 4 hours. Given 324 ASA in route. Reports CP subsided on arrival.     Review of patient's allergies indicates:   Allergen Reactions    Codeine     Dilaudid [hydromorphone] Other (See Comments)     hallucinations    Hydrocodone Hives, Nausea And Vomiting and Other (See Comments)     hallucinations    Latex Itching    Tramadol     Zofran [ondansetron hcl (pf)]      Past Medical History:   Diagnosis Date    Diverticulitis     Edema     Hypertension     Spinal stenosis     Thyroid disease       "

## 2024-10-17 NOTE — DISCHARGE INSTRUCTIONS
Follow-up with your primary care provider and psychiatry. You may use the below contact information to obtain an appointment.    Return to the emergency room for new, worsening, or concerning symptoms.     Future Appointments   Date Time Provider Department Center   10/29/2024  2:20 PM Noreen Stephenson MD [facility]   12/17/2024  4:00 PM Gema Lebron MD [facility]

## 2024-10-17 NOTE — DISCHARGE SUMMARY
"ED Observation Unit  Discharge Summary        History of Present Illness:    68 y/o F with PMHx of HTN, hypothyroidism, diverticulitis and anxiety presents with c/o chest pain. Pt reports that she began to experience 10/10, centralized, non radiating chest pain at 2230 last night. She says that she "felt like something was sitting on her chest" and was unable to move due to the pain. This morning she called EMS due to the severity of the pain but says that she feels her pain has improved because she "is not alone" and is "in a safe environment" -- she notes she is still experiencing some chest tightness. She also endorses some nausea, shortness of breath and abdominal pain during this time period and attempted to manage symptoms with phenergan which was ineffective. She denies cough, vomiting, leg swelling or any other complaints at this time. Pt reports that she had pancreatitis a few weeks ago. Pt notes a history of anxiety and says that she currently has a lot of stressors in her life and has been unable to sleep. She says that she normally takes hydroxyzine for anxiety and denies recent alcohol use. She had a negative stress test a few years ago.     Observation Course:    Placed in [facility] for cardiac stress testing and troponin trending. Trop negative x3. Exercise stress negative for ischemic findings. Suspect gastritis and multiple social stressors are factoring into her symptoms. Psychiatry will not be able to see patient until tomorrow morning in regards to her depression and anxiety. Patient does not want to stay overnight to see psychiatry and prefers to follow-up as outpatient which I feel is appropriate. Ambulatory referral placed. Patient expresses understanding and agreeable to the plan. Return to ED precautions given for new, worsening, or concerning symptoms.     Consultants:    None    Final Diagnosis:  Chest Pain  Depression    Discharge Condition: Good    Disposition: Home or Self Care     Time " spent on the discharge of the patient including review of hospital course with the patient. reviewing discharge medications and arranging follow-up care 35 minutes.  Patient was seen and examined on the date of discharge and determined to be suitable for discharge.    Follow Up:  Future Appointments   Date Time Provider Department Center   10/29/2024  2:20 PM Noreen Stephenson MD [facility]   12/17/2024  4:00 PM Gema Lebron MD [facility]

## 2024-10-17 NOTE — H&P
"ED Observation Unit  History and Physical      I assumed care of this patient from the Main ED at onset of observation time, 0954 on 10/17/2024.       History of Present Illness:    70 y/o F with PMHx of HTN, hypothyroidism, diverticulitis and anxiety presents with c/o chest pain. Pt reports that she began to experience 10/10, centralized, non radiating chest pain at 2230 last night. She says that she "felt like something was sitting on her chest" and was unable to move due to the pain. This morning she called EMS due to the severity of the pain but says that she feels her pain has improved because she "is not alone" and is "in a safe environment" -- she notes she is still experiencing some chest tightness. She also endorses some nausea, shortness of breath and abdominal pain during this time period and attempted to manage symptoms with phenergan which was ineffective. She denies cough, vomiting, leg swelling or any other complaints at this time. Pt reports that she had pancreatitis a few weeks ago. Pt notes a history of anxiety and says that she currently has a lot of stressors in her life and has been unable to sleep. She says that she normally takes hydroxyzine for anxiety and denies recent alcohol use. She had a negative stress test a few years ago.      The history is provided by the patient.     Initial troponin in the ED was negative.  Psychiatry was consulted by ED team, but did not meet PEC criteria.  Patient was placed in observation for further cardiac workup as well as full evaluation by Psychiatry for depression.  Chest pain improved with ED team, but on my evaluation patient complaining that the pain has returned.  She states that the nitroglycerin did not help her pain.  She got the most relief with morphine and another medication given in the ED that she can not recall.  Per chart, patient received Toradol as well as Tylenol.        I reviewed the ED Provider Note dated 10/17/2024  prior to my " evaluation of this patient.  I reviewed all labs and imaging performed in the Main ED, prior to patient being placed in Observation. Patient was placed in the ED Observation Unit for Chest pain.    PMHx   Past Medical History:   Diagnosis Date    Diverticulitis     Edema     Hypertension     Spinal stenosis     Thyroid disease       Past Surgical History:   Procedure Laterality Date    APPENDECTOMY      BREAST SURGERY  2023     SECTION, CLASSIC      COLON SURGERY      COSMETIC SURGERY      LIPOSUCTION  2023    NASAL RECONSTRUCTION      SIGMOIDECTOMY      TONSILLECTOMY      TOTAL REDUCTION MAMMOPLASTY Bilateral     and lift        Family Hx   Family History   Problem Relation Name Age of Onset    No Known Problems Mother      COPD Father Gerry Hamm     Breast cancer Sister  48    Arthritis Maternal Grandmother Vaishali Wilson     Cancer Sister Angie Handy-Breast cancer         Social Hx   Social History     Socioeconomic History    Marital status:    Tobacco Use    Smoking status: Never     Passive exposure: Never    Smokeless tobacco: Never   Substance and Sexual Activity    Alcohol use: Yes     Alcohol/week: 10.0 standard drinks of alcohol     Types: 10 Glasses of wine per week    Drug use: No    Sexual activity: Yes     Partners: Male     Birth control/protection: None     Comment: I am 69 years old     Social Drivers of Health     Stress: Stress Concern Present (2020)    [employer] - Occupational Stress Questionnaire     Feeling of Stress : To some extent        Vital Signs   Vitals:    10/17/24 0900 10/17/24 0930 10/17/24 1007 10/17/24 1157   BP: (!) 93/58 (!) 107/57 110/72 126/77   Pulse: 87 81 74 78   Resp: 20 20 19 18   Temp:    98 °F (36.7 °C)   TempSrc:    Oral   SpO2: 95% 95% 100% 96%   Weight:       Height:            Review of Systems  Review of Systems   Cardiovascular:  Positive for chest pain (Currently resolved).    Psychiatric/Behavioral:  Positive for depression.        Physical Exam  Physical Exam  Vitals reviewed.   Constitutional:       General: She is not in acute distress.     Appearance: She is not diaphoretic.   HENT:      Head: Normocephalic and atraumatic.   Cardiovascular:      Rate and Rhythm: Normal rate and regular rhythm.      Heart sounds: Heart sounds not distant. No murmur heard.     No friction rub. No gallop.   Pulmonary:      Effort: Pulmonary effort is normal. No respiratory distress.      Breath sounds: Normal breath sounds. No decreased breath sounds, wheezing, rhonchi or rales.   Musculoskeletal:      Cervical back: Neck supple.   Skin:     General: Skin is warm and dry.   Neurological:      Mental Status: She is alert.   Psychiatric:         Mood and Affect: Mood and affect normal.         Medications:   Scheduled Meds:   atorvastatin  20 mg Oral QHS    [START ON 10/18/2024] levothyroxine  88 mcg Oral QAM    lisinopriL-hydrochlorothiazide  1 tablet Oral QAM    LORazepam  0.5 mg Oral Nightly    morphine  4 mg Intravenous Once    promethazine (PHENERGAN) 12.5 mg in 0.9% NaCl 50 mL IVPB  12.5 mg Intravenous ED 1 Time     Continuous Infusions:  PRN Meds:.  Current Facility-Administered Medications:     acetaminophen, 650 mg, Oral, Q8H PRN    hydrOXYzine HCL, 25 mg, Oral, Nightly PRN    melatonin, 6 mg, Oral, Nightly PRN    promethazine, 25 mg, Oral, Daily PRN    sodium chloride 0.9%, 10 mL, Intravenous, PRN      Assessment/Plan:  Chest pain  - Initial trop negative. Continue to trend  - received ASA with EMS  - Exercise stress echo ordered  - Telemetry  - prn analgesics.  Will try GI cocktail as well.     Depression  - Does not meet PEC criteria at this time  - Psychiatry consult     Case was discussed with the ED provider, Dr. Sun ([facility]).

## 2024-10-17 NOTE — Clinical Note
Diagnosis: Depression, unspecified depression type [[id number]]   Future Attending Provider: PORTIA DUNNE [[id number]]   Is the patient being sent to ED Observation?: Yes

## 2024-10-18 ENCOUNTER — PATIENT OUTREACH (OUTPATIENT)
Dept: ADMINISTRATIVE | Facility: CLINIC | Age: 69
End: 2024-10-18
Payer: MEDICARE

## 2024-10-28 ENCOUNTER — TELEPHONE (OUTPATIENT)
Dept: PRIMARY CARE CLINIC | Facility: CLINIC | Age: 69
End: 2024-10-28
Payer: MEDICARE

## 2024-10-29 DIAGNOSIS — F51.05 HYPOSOMNIA, INSOMNIA OR SLEEPLESSNESS ASSOCIATED WITH ANXIETY: ICD-10-CM

## 2024-10-29 DIAGNOSIS — F41.9 HYPOSOMNIA, INSOMNIA OR SLEEPLESSNESS ASSOCIATED WITH ANXIETY: ICD-10-CM

## 2024-10-29 RX ORDER — TEMAZEPAM 15 MG/1
30 CAPSULE ORAL NIGHTLY PRN
Qty: 60 CAPSULE | Refills: 0 | Status: CANCELLED | OUTPATIENT
Start: 2024-10-29

## 2024-10-30 ENCOUNTER — PATIENT MESSAGE (OUTPATIENT)
Dept: PRIMARY CARE CLINIC | Facility: CLINIC | Age: 69
End: 2024-10-30
Payer: MEDICARE

## 2024-10-30 DIAGNOSIS — F41.9 HYPOSOMNIA, INSOMNIA OR SLEEPLESSNESS ASSOCIATED WITH ANXIETY: ICD-10-CM

## 2024-10-30 DIAGNOSIS — F51.05 HYPOSOMNIA, INSOMNIA OR SLEEPLESSNESS ASSOCIATED WITH ANXIETY: ICD-10-CM

## 2024-10-30 RX ORDER — TEMAZEPAM 15 MG/1
30 CAPSULE ORAL NIGHTLY PRN
Qty: 60 CAPSULE | Refills: 3 | Status: SHIPPED | OUTPATIENT
Start: 2024-10-30

## 2024-11-17 ENCOUNTER — PATIENT MESSAGE (OUTPATIENT)
Dept: PRIMARY CARE CLINIC | Facility: CLINIC | Age: 69
End: 2024-11-17
Payer: MEDICARE

## 2024-11-19 ENCOUNTER — PATIENT MESSAGE (OUTPATIENT)
Dept: PRIMARY CARE CLINIC | Facility: CLINIC | Age: 69
End: 2024-11-19
Payer: MEDICARE

## 2024-11-19 DIAGNOSIS — Z12.11 COLON CANCER SCREENING: Primary | ICD-10-CM

## 2024-11-20 NOTE — TELEPHONE ENCOUNTER
Lov 10/7/24  Pts last cologuard was in Aug 2021. I see that last month at her visit you referred to GI and ordered a CT of Abdomen. Last colonoscopy we have on file was in 2010. Was going to order cologuard as pt requesting per written order guidelines but unsure if this is ok since you referred to GI for abd pain recently

## 2024-12-02 RX ORDER — ROSUVASTATIN CALCIUM 5 MG/1
5 TABLET, COATED ORAL DAILY
Qty: 90 TABLET | Refills: 3 | Status: SHIPPED | OUTPATIENT
Start: 2024-12-02

## 2024-12-02 NOTE — TELEPHONE ENCOUNTER
No care due was identified.  [facility] Embedded Care Due Messages. Reference number: [id number].   12/02/2024 4:06:56 AM CST

## 2024-12-22 DIAGNOSIS — E03.9 HYPOTHYROIDISM, UNSPECIFIED TYPE: ICD-10-CM

## 2024-12-22 NOTE — TELEPHONE ENCOUNTER
No care due was identified.  [facility] Embedded Care Due Messages. Reference number: [id number].   12/22/2024 12:37:21 AM CST

## 2024-12-24 RX ORDER — LEVOTHYROXINE SODIUM 88 UG/1
88 TABLET ORAL
Qty: 90 TABLET | Refills: 3 | Status: SHIPPED | OUTPATIENT
Start: 2024-12-24

## 2024-12-30 ENCOUNTER — TELEPHONE (OUTPATIENT)
Dept: PRIMARY CARE CLINIC | Facility: CLINIC | Age: 69
End: 2024-12-30
Payer: MEDICARE

## 2024-12-30 NOTE — TELEPHONE ENCOUNTER
----- Message from Lou sent at 12/30/2024 12:48 PM CST -----  Contact: [phone] Patient  Patient is returning a phone call.    Who left a message for the patient: Archana Levine MA to Netta Tapia    Does patient know what this is regarding:  We tried to contact you by the number you left, your phone rang once and went to [employer].    Would you like a call back, or a response through your [facility] portal?:  pt states she will call back after reading the [facility] message if she has any questions.     Comments:

## 2024-12-30 NOTE — TELEPHONE ENCOUNTER
----- Message from Joao sent at 12/30/2024 12:29 PM CST -----  Name Of Caller: Essence        Provider Name:Noreen Stephenson        Does patient feel the need to be seen today? no        Relationship to the Pt?: patient        Contact Preference?:[phone]        What is the nature of the call?: Patient states that she was informed by mail that Dr Stephenson was moving to a different location but she missed placed the letter, patient would like to speak with someone in the office to get move information about this move.

## 2024-12-30 NOTE — TELEPHONE ENCOUNTER
----- Message from Chris sent at 12/30/2024 12:57 PM CST -----  Contact: [phone]  Patient is returning a phone call.  Who left a message for the patient: Archana  Does patient know what this is regarding:    Would you like a call back, or a response through your [facility] portal?:   call back   Comments:

## 2025-01-13 ENCOUNTER — LAB VISIT (OUTPATIENT)
Dept: LAB | Facility: HOSPITAL | Age: 70
End: 2025-01-13
Attending: STUDENT IN AN ORGANIZED HEALTH CARE EDUCATION/TRAINING PROGRAM
Payer: MEDICARE

## 2025-01-13 ENCOUNTER — OFFICE VISIT (OUTPATIENT)
Dept: ALLERGY | Facility: CLINIC | Age: 70
End: 2025-01-13
Payer: MEDICARE

## 2025-01-13 VITALS
DIASTOLIC BLOOD PRESSURE: 82 MMHG | BODY MASS INDEX: 21.26 KG/M2 | HEART RATE: 84 BPM | SYSTOLIC BLOOD PRESSURE: 111 MMHG | OXYGEN SATURATION: 97 % | HEIGHT: 63 IN

## 2025-01-13 DIAGNOSIS — J31.0 CHRONIC RHINITIS: Primary | ICD-10-CM

## 2025-01-13 DIAGNOSIS — G89.29 CHRONIC NONINTRACTABLE HEADACHE, UNSPECIFIED HEADACHE TYPE: ICD-10-CM

## 2025-01-13 DIAGNOSIS — D53.9 MACROCYTIC ANEMIA: ICD-10-CM

## 2025-01-13 DIAGNOSIS — R51.9 CHRONIC NONINTRACTABLE HEADACHE, UNSPECIFIED HEADACHE TYPE: ICD-10-CM

## 2025-01-13 DIAGNOSIS — R49.9 CHANGE IN VOICE: ICD-10-CM

## 2025-01-13 DIAGNOSIS — E06.9 HYPERTHYROIDITIS: ICD-10-CM

## 2025-01-13 DIAGNOSIS — J31.0 CHRONIC RHINITIS: ICD-10-CM

## 2025-01-13 LAB — IGE SERPL-ACNC: <21 IU/ML (ref 0–100)

## 2025-01-13 PROCEDURE — 1160F RVW MEDS BY RX/DR IN RCRD: CPT | Mod: CPTII,S$GLB,, | Performed by: STUDENT IN AN ORGANIZED HEALTH CARE EDUCATION/TRAINING PROGRAM

## 2025-01-13 PROCEDURE — 3074F SYST BP LT 130 MM HG: CPT | Mod: CPTII,S$GLB,, | Performed by: STUDENT IN AN ORGANIZED HEALTH CARE EDUCATION/TRAINING PROGRAM

## 2025-01-13 PROCEDURE — 3288F FALL RISK ASSESSMENT DOCD: CPT | Mod: CPTII,S$GLB,, | Performed by: STUDENT IN AN ORGANIZED HEALTH CARE EDUCATION/TRAINING PROGRAM

## 2025-01-13 PROCEDURE — 82785 ASSAY OF IGE: CPT | Performed by: STUDENT IN AN ORGANIZED HEALTH CARE EDUCATION/TRAINING PROGRAM

## 2025-01-13 PROCEDURE — 1101F PT FALLS ASSESS-DOCD LE1/YR: CPT | Mod: CPTII,S$GLB,, | Performed by: STUDENT IN AN ORGANIZED HEALTH CARE EDUCATION/TRAINING PROGRAM

## 2025-01-13 PROCEDURE — 86003 ALLG SPEC IGE CRUDE XTRC EA: CPT | Mod: 59 | Performed by: STUDENT IN AN ORGANIZED HEALTH CARE EDUCATION/TRAINING PROGRAM

## 2025-01-13 PROCEDURE — 3079F DIAST BP 80-89 MM HG: CPT | Mod: CPTII,S$GLB,, | Performed by: STUDENT IN AN ORGANIZED HEALTH CARE EDUCATION/TRAINING PROGRAM

## 2025-01-13 PROCEDURE — 86003 ALLG SPEC IGE CRUDE XTRC EA: CPT | Performed by: STUDENT IN AN ORGANIZED HEALTH CARE EDUCATION/TRAINING PROGRAM

## 2025-01-13 PROCEDURE — 1126F AMNT PAIN NOTED NONE PRSNT: CPT | Mod: CPTII,S$GLB,, | Performed by: STUDENT IN AN ORGANIZED HEALTH CARE EDUCATION/TRAINING PROGRAM

## 2025-01-13 PROCEDURE — 3008F BODY MASS INDEX DOCD: CPT | Mod: CPTII,S$GLB,, | Performed by: STUDENT IN AN ORGANIZED HEALTH CARE EDUCATION/TRAINING PROGRAM

## 2025-01-13 PROCEDURE — 99204 OFFICE O/P NEW MOD 45 MIN: CPT | Mod: S$GLB,,, | Performed by: STUDENT IN AN ORGANIZED HEALTH CARE EDUCATION/TRAINING PROGRAM

## 2025-01-13 PROCEDURE — 1159F MED LIST DOCD IN RCRD: CPT | Mod: CPTII,S$GLB,, | Performed by: STUDENT IN AN ORGANIZED HEALTH CARE EDUCATION/TRAINING PROGRAM

## 2025-01-13 PROCEDURE — 99999 PR PBB SHADOW E&M-EST. PATIENT-LVL V: CPT | Mod: PBBFAC,,, | Performed by: STUDENT IN AN ORGANIZED HEALTH CARE EDUCATION/TRAINING PROGRAM

## 2025-01-13 RX ORDER — IPRATROPIUM BROMIDE 42 UG/1
2 SPRAY, METERED NASAL 2 TIMES DAILY PRN
Qty: 15 ML | Refills: 5 | Status: SHIPPED | OUTPATIENT
Start: 2025-01-13

## 2025-01-13 RX ORDER — AZELASTINE 1 MG/ML
2 SPRAY, METERED NASAL 2 TIMES DAILY PRN
Qty: 30 ML | Refills: 11 | Status: SHIPPED | OUTPATIENT
Start: 2025-01-13 | End: 2026-01-13

## 2025-01-13 NOTE — PROGRESS NOTES
Essence Tapia  1955        Subjective     Chief Complaint: Establish Care      History of Present Illness:  Ms. Essence Tapia is a 69 y.o. female who presents to clinic for transfer of care. Previously followed with Dr. Noreen Stephenson who moved out of town.      Mammo: due soon, 3/2025  C-scope: not utd, ordered  DEXA: not utd, ordered      History of Present Illness    CHIEF COMPLAINT:  Ms. Tapia presents today for a routine follow-up and medication review.    MEDICAL HISTORY:  She reports a recent hospitalization for major chest pain which occurred shortly after leaving her part-time job and worsening anxiety, now stable. She has a history of diverticulitis with prior surgical intervention and a history of pancreatitis. She reports abdominal pains but has not sought hospital care due to recent pancreatitis hospitalization. She denies current chest pain, shortness of breath, or recent fevers.    CURRENT MEDICATIONS:  She takes blood pressure medication, cholesterol medication, Levothyroxine for thyroid, and Temazepam 15mg monthly (60 capsules) for sleep. For anxiety, she takes Anorax nightly. She uses Metamucil daily and Promethazine as needed, though notes bloating with Promethazine use. Previous trials of Sonata and Lunesta were ineffective for sleep, and she reports no additional benefit from 30mg Temazepam dosing. She has previously used morphine effectively when severely ill and Avalox antibiotic during episodes of pancreatitis.    ALLERGIES:  She has chronic post-nasal drip causing throat symptoms. Recent allergy testing showed normal IgE levels. She primarily uses Claritin, occasionally alternating with Zyrtec or Xyzal, and uses one prescribed nasal spray.    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 She consumes red wine but denies hard alcohol. She follows a predominantly vegetarian diet with occasional turkey and  cod consumption. Her physical activity has recently decreased from previously maintaining 18,000 steps daily due to job loss.    SLEEP:  She reports significant sleep disturbances, only sleeping for approximately one hour at a time, which has worsened from previous three-hour sleep intervals.    FAMILY HISTORY:  She reports family history of high cholesterol and mental health concerns related to family members.    PREVENTIVE CARE:  Cologuard test has been ordered. DEXA scan was ordered but not completed. She has never received a flu vaccine but has received one dose of COVID-19 vaccine. She received one dose of shingles vaccine with subsequent illness. She is considering recommended pneumonia vaccination.              Review of Systems   Constitutional:  Negative for chills and fever.   HENT:  Negative for sore throat.    Respiratory:  Negative for shortness of breath.    Cardiovascular:  Negative for chest pain and palpitations.   Gastrointestinal:  Negative for blood in stool, constipation, diarrhea, nausea and vomiting.   Genitourinary:  Negative for dysuria and hematuria.   Neurological:  Negative for dizziness.   Psychiatric/Behavioral:  The patient has insomnia.         PAST HISTORY:     Past Medical History:   Diagnosis Date    Diverticulitis     Edema     Hypertension     Spinal stenosis     Thyroid disease        Past Surgical History:   Procedure Laterality Date    APPENDECTOMY      BREAST SURGERY  2023     SECTION, CLASSIC      COLON SURGERY      COSMETIC SURGERY      LIPOSUCTION  2023    NASAL RECONSTRUCTION      SIGMOIDECTOMY      TONSILLECTOMY      TOTAL REDUCTION MAMMOPLASTY Bilateral     and lift       Family History   Problem Relation Name Age of Onset    No Known Problems Mother      COPD Father Gerry Hamm     Breast cancer Sister  48    Arthritis Maternal Grandmother Vaishali Katie     Cancer Sister Michajuliocesar Ronquillo Rozina-Breast cancer        Social History      Socioeconomic History    Marital status:    Tobacco Use    Smoking status: Never     Passive exposure: Never    Smokeless tobacco: Never   Substance and Sexual Activity    Alcohol use: Yes     Alcohol/week: 10.0 standard drinks of alcohol     Types: 10 Glasses of wine per week    Drug use: No    Sexual activity: Yes     Partners: Male     Birth control/protection: None     Comment: I am 69 years old     Social Drivers of Health     Stress: Stress Concern Present (1/9/2020)    [employer] - Occupational Stress Questionnaire     Feeling of Stress : To some extent       MEDICATIONS & ALLERGIES:     Current Outpatient Medications on File Prior to Visit   Medication Sig    azelastine (ASTELIN) 137 mcg (0.1 %) nasal spray 2 sprays (274 mcg total) by Nasal route 2 (two) times daily as needed. Donot snort (because it tastes bad)    bismuth subsalicylate (BISMUTH) 262 mg Chew Take by mouth 2 (two) times daily as needed (stomach upset).    brimonidine tartrate (LUMIFY OPHT) Place 1 drop into both eyes daily as needed (redness).    hydrOXYzine HCL (ATARAX) 25 MG tablet Take 1 tablet (25 mg total) by mouth daily as needed for Anxiety. (Patient taking differently: Take 25 mg by mouth nightly as needed for Anxiety.)    ibuprofen (ADVIL) 200 MG tablet Take 400 mg by mouth daily as needed (headache).    ipratropium (ATROVENT) 42 mcg (0.06 %) nasal spray 2 sprays by Each Nostril route 2 (two) times daily as needed.    levothyroxine (SYNTHROID) 88 MCG tablet TAKE 1 TABLET BY MOUTH EVERY DAY    lisinopriL-hydrochlorothiazide (PRINZIDE,ZESTORETIC) 10-12.5 mg per tablet Take 1 tablet by mouth once daily. (Patient taking differently: Take 1 tablet by mouth every morning.)    loratadine (CLARITIN) 10 mg tablet Take 10 mg by mouth daily as needed for Allergies.    promethazine (PHENERGAN) 25 MG tablet TAKE 1 TABLET BY MOUTH EVERY 6 HOURS AS NEEDED FOR NAUSEA    psyllium (METAMUCIL SUGAR FREE) Powd  "Take by mouth. Place 1 tbsp into beverage and drink daily    rosuvastatin (CRESTOR) 5 MG tablet Take 1 tablet (5 mg total) by mouth once daily.    temazepam (RESTORIL) 15 mg Cap Take 2 capsules (30 mg total) by mouth nightly as needed (sleep disturbance).    UNABLE TO FIND Take 1 tablet by mouth Daily. medication name: BIO COMPLETE 3    [DISCONTINUED] cetirizine (ZYRTEC) 10 MG tablet Take 10 mg by mouth daily as needed for Allergies.    [DISCONTINUED] morphine (MSIR) 15 MG tablet Take 1 tablet (15 mg total) by mouth every 6 (six) hours as needed for Pain.     No current facility-administered medications on file prior to visit.       Review of patient's allergies indicates:   Allergen Reactions    Codeine     Hydrocodone Hives, Nausea And Vomiting and Other (See Comments)     hallucinations    Hydromorphone Other (See Comments)     hallucinations    Latex Itching    Tramadol     Zofran [ondansetron hcl (pf)]        OBJECTIVE:     Vital Signs:  Vitals:    01/14/25 1104   BP: 124/80   BP Location: Right arm   Patient Position: Sitting   Pulse: 77   Resp: 18   Temp: 97.7 °F (36.5 °C)   TempSrc: Oral   SpO2: 99%   Weight: 55.5 kg (122 lb 5.7 oz)   Height: 5' 2" (1.575 m)       Body mass index is 22.38 kg/m².     Physical Exam:  Physical Exam  Vitals and nursing note reviewed.   Constitutional:       General: She is not in acute distress.     Appearance: She is not ill-appearing.   HENT:      Head: Normocephalic and atraumatic.      Mouth/Throat:      Mouth: Mucous membranes are moist.      Pharynx: Oropharynx is clear. No oropharyngeal exudate or posterior oropharyngeal erythema.   Eyes:      Extraocular Movements: Extraocular movements intact.      Conjunctiva/sclera: Conjunctivae normal.   Cardiovascular:      Rate and Rhythm: Normal rate and regular rhythm.   Pulmonary:      Effort: Pulmonary effort is normal. No respiratory distress.      Breath sounds: Normal breath sounds. No wheezing or rales.   Chest:      Chest " "wall: No tenderness.   Abdominal:      Palpations: Abdomen is soft.      Tenderness: There is no abdominal tenderness. There is no guarding.   Musculoskeletal:         General: Normal range of motion.      Cervical back: Normal range of motion and neck supple. No tenderness.      Right lower leg: No edema.      Left lower leg: No edema.   Lymphadenopathy:      Cervical: No cervical adenopathy.   Skin:     General: Skin is warm and dry.   Neurological:      Mental Status: She is alert and oriented to person, place, and time.            Laboratory  Lab Results   Component Value Date    WBC 3.32 (L) 10/17/2024    HGB 11.5 (L) 10/17/2024    HCT 35.3 (L) 10/17/2024     (H) 10/17/2024     (L) 10/17/2024     Lab Results   Component Value Date    GLU 75 10/17/2024     10/17/2024    K 3.8 10/17/2024     (H) 10/17/2024    CO2 14 (L) 10/17/2024    BUN 12 10/17/2024    CREATININE 0.6 10/17/2024    CALCIUM 7.1 (L) 10/17/2024    MG 1.9 10/10/2023     Lab Results   Component Value Date    INR 1.0 09/09/2004     Lab Results   Component Value Date    HGBA1C 4.5 06/13/2023     No results for input(s): "POCTGLUCOSE" in the last 72 hours.      Health Maintenance         Date Due Completion Date    Pneumococcal Vaccines (Age 50+) (1 of 2 - PCV) Never done ---    RSV Vaccine (Age 60+ and Pregnant patients) (1 - Risk 60-74 years 1-dose series) Never done ---    DEXA Scan 09/30/2022 9/30/2019    Shingles Vaccine (2 of 2) 04/27/2024 3/2/2024    Colorectal Cancer Screening 08/19/2024 8/19/2021    COVID-19 Vaccine (2 - 2024-25 season) 09/01/2024 6/23/2021    Mammogram 03/19/2025 3/19/2024    High Dose Statin 01/13/2026 1/13/2025    Lipid Panel 10/07/2029 10/7/2024    TETANUS VACCINE 03/10/2032 3/10/2022            ASSESSMENT & PLAN:   69 y.o. female who was seen today in clinic for establishing care    Hypertension, unspecified type    Hyposomnia, insomnia or sleeplessness associated with anxiety    Hypothyroidism, " unspecified type    Colon cancer screening    Aortic atherosclerosis    Mixed hyperlipidemia    Chronic rhinitis    Encounter to establish care         1. Hypertension, unspecified type    2. Hyposomnia, insomnia or sleeplessness associated with anxiety    3. Hypothyroidism, unspecified type    4. Colon cancer screening    5. Aortic atherosclerosis    6. Mixed hyperlipidemia    7. Chronic rhinitis    8. Encounter to establish care        Stable, continue home meds  Chronic, stable, continue home meds.   reviewed with no suspicious activity  Stable, continue meds  Cologuard ordered  5/6.  Stable, continue home meds.  Discussed importance of diet and exercise.  7.  Chronic, stable.  Continue home meds and close follow up with allergist, pending labs  8.  Patient declining vaccines today including shingles, COVID, Flu, PNA.  Considering PNA.  DEXA scan ordered by previous PCP, will schedule      RTC in 6 months or sooner if needed    Easton Sadler MD  [facility]    This note was generated with the assistance of ambient listening technology. Verbal consent was obtained by the patient and accompanying visitor(s) for the recording of patient appointment to facilitate this note. I attest to having reviewed and edited the generated note for accuracy, though some syntax or spelling errors may persist. Please contact the author of this note for any clarification.

## 2025-01-13 NOTE — PROGRESS NOTES
Allergy Clinic Note  [facility]    This note was created by combination of typed  and dictation. Transcription errors are likely.  If there are any questions, please contact me.    Subjective:      Patient ID: Essence Tapia is a 69 y.o. female.    Chief Complaint: Allergies, Sinus Problem, and Nasal Congestion      Referring Provider: Noreen Stephenson    History of Present Illness: Essence Tapia is a 69 y.o. female with chronic rhinitis is hear complaining of raspy voice.  Client evidently referred for headache but she denies significant symptoms.    Related medications and other interventions  (ACE inhibitor)    1/13/2025:  At initial visit, client reported a several decade history of hoarseness.  She says it started when she was in his  (copious passive smoke exposure).  At the time it was intermittent but this has subsequently become chronic.  Additional symptoms include nasal congestion, runny nose, postnasal drip sensation, cough, throat clearing, sneezing, and itchy eyes.  The throat and nasal symptoms are perennial without variation.  The itching and sneezing occurs occasionally.  She has not been helped by Claritin, Zyrtec, Xyzal, Flonase.  She does not think she has tried Astelin or Atrovent.  She had allergy testing around age 4[birth year]20[birth year]2023 which she said was positive and she was treated with immunotherapy.  She has not had any recent allergy testing.       MEDICAL HISTORY      Significant past medical history: HTN, thyroid disease, macrocytic anemia  Active problem list reviewed  ENT surgery:  tonsillectomy, nasal reconstruction    Significant family history:  No allergies or asthma  Exposures:  Grandchildren age 2 years and 5 months as 1/13/2025.  + grand dog 1-2x/wk.  No current passive smoke exposure but history of heavy dust exposure in the past.  No pets in her home.  Smoking Hx:  Client  reports that she has never smoked. She has never been exposed to  tobacco smoke. She has never used smokeless tobacco.    Meds: MAR reviewed    Asthma: No  Eczema: No   Rhinitis: Yes, See HPI  Drug allergy/intolerance:   Venom allergy:  No      Patient Active Problem List   Diagnosis    Diverticular disease    History of pancreatitis    Hypertension    Hyposomnia, insomnia or sleeplessness associated with anxiety    Hypothyroidism    Screening for colon cancer    Chest pain    Alcohol abuse    Drug-induced acute pancreatitis    Anxiety    Aortic atherosclerosis     Medication List with Changes/Refills   New Medications    AZELASTINE (ASTELIN) 137 MCG (0.1 %) NASAL SPRAY    2 sprays (274 mcg total) by Nasal route 2 (two) times daily as needed. Donot snort (because it tastes bad)       Start Date: 1/13/2025 End Date: 1/13/2026    IPRATROPIUM (ATROVENT) 42 MCG (0.06 %) NASAL SPRAY    2 sprays by Each Nostril route 2 (two) times daily as needed.       Start Date: 1/13/2025 End Date: --   Current Medications    BISMUTH SUBSALICYLATE (BISMUTH) 262 MG CHEW    Take by mouth 2 (two) times daily as needed (stomach upset).       Start Date: --        End Date: --    BRIMONIDINE TARTRATE (LUMIFY OPHT)    Place 1 drop into both eyes daily as needed (redness).       Start Date: --        End Date: --    CETIRIZINE (ZYRTEC) 10 MG TABLET    Take 10 mg by mouth daily as needed for Allergies.       Start Date: --        End Date: --    HYDROXYZINE HCL (ATARAX) 25 MG TABLET    Take 1 tablet (25 mg total) by mouth daily as needed for Anxiety.       Start Date: 8/7/2024  End Date: --    IBUPROFEN (ADVIL) 200 MG TABLET    Take 400 mg by mouth daily as needed (headache).       Start Date: --        End Date: --    LEVOTHYROXINE (SYNTHROID) 88 MCG TABLET    TAKE 1 TABLET BY MOUTH EVERY DAY       Start Date: 12/24/2024End Date: --    LISINOPRIL-HYDROCHLOROTHIAZIDE (PRINZIDE,ZESTORETIC) 10-12.5 MG PER TABLET    Take 1 tablet by mouth once daily.       Start Date: 9/1/2024  End Date: --    LORATADINE  "(CLARITIN) 10 MG TABLET    Take 10 mg by mouth daily as needed for Allergies.       Start Date: --        End Date: --    MORPHINE (MSIR) 15 MG TABLET    Take 1 tablet (15 mg total) by mouth every 6 (six) hours as needed for Pain.       Start Date: 10/10/2023End Date: --    PROMETHAZINE (PHENERGAN) 25 MG TABLET    TAKE 1 TABLET BY MOUTH EVERY 6 HOURS AS NEEDED FOR NAUSEA       Start Date: 7/23/2024 End Date: --    PSYLLIUM (METAMUCIL SUGAR FREE) POWD    Take by mouth. Place 1 tbsp into beverage and drink daily       Start Date: --        End Date: --    ROSUVASTATIN (CRESTOR) 5 MG TABLET    Take 1 tablet (5 mg total) by mouth once daily.       Start Date: 12/2/2024 End Date: --    TEMAZEPAM (RESTORIL) 15 MG CAP    Take 2 capsules (30 mg total) by mouth nightly as needed (sleep disturbance).       Start Date: 10/30/2024End Date: --    UNABLE TO FIND    Take 1 tablet by mouth Daily. medication name: BIO COMPLETE 3       Start Date: --        End Date: --         REVIEW OF SYSTEMS      CONST: no F/C/NS, no unintentional weight changes  NEURO:  no tremor, no weakness  EYES: no discharge, no erythema  EARS: no hearing loss, no sensation of fullness  PULM:  no SOB, no wheezing, no cough  CV: no CP, no palpitations  DERM: no rashes, no skin breaks    PHYSICAL EXAM     /82 (BP Location: Left arm, Patient Position: Sitting)   Pulse 84   Ht 5' 3" (1.6 m)   SpO2 97%   BMI 21.26 kg/m²   GEN: Awake and alert, no distress  DERM:  No flushing, no rashes  EYE:  No ocular discharge, no redness  HEENT: No nasal discharge, no hoarseness, TMs clear b/l.  Right naris is pink and smooth.  Left nares is slightly darker and somewhat friable.  There is no significant turbinates swelling.  Is a small amount dried mucus left side only.  Oropharynx is benign without exudate.  Tongue is not coated.  NECK:  No LAD  PULM: Normal work of breathing, no cough, CTA  COR:  RRR, normal capillary refill  NEURO:  No focal deficit, speech fluent " and logical  PSYCH: appropriate affect, normal behavior  EXTREM: mild deformity of b/l MCP, b/l wrists and some DIP    MEDICAL DECISION MAKING     Data reviewed:       (new entries in bold-face)      Allergy Testing      Ordered      Lab results   (new entries in bold face)      Eo count 200, 2024      Imaging and other diagnostics            Medical records review           Diagnoses:     Essence Tapia is a 69 y.o. female. with  1. Chronic rhinitis    2. Change in voice    3. Chronic nonintractable headache, unspecified headache type    4. Macrocytic anemia          Assessment / Plan   Ms Tapia has longstanding hoarseness, previously intermittent, now chronic.  This may or may not be related to postnasal drip and her other chronic rhinitis symptoms.  Differential diagnosis also includes laryngeal pharyngeal reflux.  Discussed briefly.  Also mentioned [facility].  Discussed difficulty of treatment for postnasal drip and need for trial and error approach.  Diagnostically, I recommend screening for allergies by the Immunocap method.  Therapeutically I recommended beginning trial and error with Astelin.  If no improvement switch to Atrovent.  Follow up in 2 weeks.      Chronic rhinitis  -     IgE; Future; Expected date: 01/13/2025  -     Dermatophagoides [place]; Future; Expected date: 01/13/2025  -     Dermatophagoides Pteronyssinus; Future; Expected date: 01/13/2025  -     [place]; Future; Expected date: 01/13/2025  -     Khoi; Future; Expected date: 01/13/2025  -     Kern; Future; Expected date: 01/13/2025  -     English Plantain; Future; Expected date: 01/13/2025  -     Oak; Future; Expected date: 01/13/2025  -     Pecan; Future; Expected date: 01/13/2025  -     Ragweed; Future; Expected date: 01/13/2025  -     Alternaria; Future; Expected date: 01/13/2025  -     Aspergillus; Future; Expected date: 01/13/2025  -     Cat; Future; Expected date: 01/13/2025  -     Dog; Future; Expected date:  01/13/2025  -     azelastine (ASTELIN) 137 mcg (0.1 %) nasal spray; 2 sprays (274 mcg total) by Nasal route 2 (two) times daily as needed. Do not snort (because it tastes bad)  Dispense: 30 mL; Refill: 11  -     ipratropium (ATROVENT) 42 mcg (0.06 %) nasal spray; 2 sprays by Each Nostril route 2 (two) times daily as needed.  Dispense: 15 mL; Refill: 5    Change in voice  -     azelastine (ASTELIN) 137 mcg (0.1 %) nasal spray; 2 sprays (274 mcg total) by Nasal route 2 (two) times daily as needed. Do not snort (because it tastes bad)  Dispense: 30 mL; Refill: 11  -     ipratropium (ATROVENT) 42 mcg (0.06 %) nasal spray; 2 sprays by Each Nostril route 2 (two) times daily as needed.  Dispense: 15 mL; Refill: 5    Chronic nonintractable headache,               Client declines intervention  -     Ambulatory referral/consult to Allergy      Comorbidities  Thyroid disease -- can contribute to rhinitis, low TSH (high thyroid) 10/7/2024  HTN -- avoid oral decongestants,   Macrocytic anemia  ACE inhibitor    PATIENT INSTRUCTIONS AND FOLLOW-UP     Patient Instructions     Testing  Blood work for allergy testing today       Check [facility] in one week for results or call [phone]       Contact me with questions or concerns       I will contact you if anything needs immediate attention.        Treatment    Astelin = azelastine nasal spray:    2 squirts each nostril as needed, up to twice a day   Do not snort (because it burns and tastes bad)    If no improvement after 3 days, stop and try the next thing ....    Atrovent (ipratropium nasal spray)             2 squirts each nostril as needed, up to twice a day      Return about 2 weeks  If nothing works, consider [facility]      Follow up in about 2 weeks (around 1/27/2025).        Gema Lebron MD  Allergy, Asthma & Immunology

## 2025-01-13 NOTE — PATIENT INSTRUCTIONS
Testing  Blood work for allergy testing today       Check [facility] in one week for results or call [phone]       Contact me with questions or concerns       I will contact you if anything needs immediate attention.        Treatment    Astelin = azelastine nasal spray:    2 squirts each nostril as needed, up to twice a day   Do not snort (because it burns and tastes bad)    If no improvement after 3 days, stop and try the next thing ....    Atrovent (ipratropium nasal spray)             2 squirts each nostril as needed, up to twice a day      Return about 2 weeks  If nothing works, consider [facility]

## 2025-01-14 ENCOUNTER — OFFICE VISIT (OUTPATIENT)
Dept: INTERNAL MEDICINE | Facility: CLINIC | Age: 70
End: 2025-01-14
Payer: MEDICARE

## 2025-01-14 VITALS
OXYGEN SATURATION: 99 % | WEIGHT: 122.38 LBS | BODY MASS INDEX: 22.52 KG/M2 | HEART RATE: 77 BPM | RESPIRATION RATE: 18 BRPM | HEIGHT: 62 IN | DIASTOLIC BLOOD PRESSURE: 80 MMHG | TEMPERATURE: 98 F | SYSTOLIC BLOOD PRESSURE: 124 MMHG

## 2025-01-14 DIAGNOSIS — I10 HYPERTENSION, UNSPECIFIED TYPE: Primary | ICD-10-CM

## 2025-01-14 DIAGNOSIS — E03.9 HYPOTHYROIDISM, UNSPECIFIED TYPE: ICD-10-CM

## 2025-01-14 DIAGNOSIS — F51.05 HYPOSOMNIA, INSOMNIA OR SLEEPLESSNESS ASSOCIATED WITH ANXIETY: ICD-10-CM

## 2025-01-14 DIAGNOSIS — I70.0 AORTIC ATHEROSCLEROSIS: ICD-10-CM

## 2025-01-14 DIAGNOSIS — F41.9 HYPOSOMNIA, INSOMNIA OR SLEEPLESSNESS ASSOCIATED WITH ANXIETY: ICD-10-CM

## 2025-01-14 DIAGNOSIS — Z12.11 COLON CANCER SCREENING: ICD-10-CM

## 2025-01-14 DIAGNOSIS — Z76.89 ENCOUNTER TO ESTABLISH CARE: ICD-10-CM

## 2025-01-14 DIAGNOSIS — J31.0 CHRONIC RHINITIS: ICD-10-CM

## 2025-01-14 DIAGNOSIS — E78.2 MIXED HYPERLIPIDEMIA: ICD-10-CM

## 2025-01-14 PROCEDURE — 3079F DIAST BP 80-89 MM HG: CPT | Mod: CPTII,S$GLB,,

## 2025-01-14 PROCEDURE — 1160F RVW MEDS BY RX/DR IN RCRD: CPT | Mod: CPTII,S$GLB,,

## 2025-01-14 PROCEDURE — 3074F SYST BP LT 130 MM HG: CPT | Mod: CPTII,S$GLB,,

## 2025-01-14 PROCEDURE — 3008F BODY MASS INDEX DOCD: CPT | Mod: CPTII,S$GLB,,

## 2025-01-14 PROCEDURE — 1126F AMNT PAIN NOTED NONE PRSNT: CPT | Mod: CPTII,S$GLB,,

## 2025-01-14 PROCEDURE — 99214 OFFICE O/P EST MOD 30 MIN: CPT | Mod: S$GLB,,,

## 2025-01-14 PROCEDURE — 99999 PR PBB SHADOW E&M-EST. PATIENT-LVL IV: CPT | Mod: PBBFAC,,,

## 2025-01-14 PROCEDURE — 1101F PT FALLS ASSESS-DOCD LE1/YR: CPT | Mod: CPTII,S$GLB,,

## 2025-01-14 PROCEDURE — 3288F FALL RISK ASSESSMENT DOCD: CPT | Mod: CPTII,S$GLB,,

## 2025-01-14 PROCEDURE — 1159F MED LIST DOCD IN RCRD: CPT | Mod: CPTII,S$GLB,,

## 2025-01-16 LAB
A ALTERNATA IGE QN: <0.1 KU/L
A FUMIGATUS IGE QN: <0.1 KU/L
BERMUDA GRASS IGE QN: <0.1 KU/L
CAT DANDER IGE QN: <0.1 KU/L
CEDAR IGE QN: <0.1 KU/L
D FARINAE IGE QN: <0.1 KU/L
D PTERONYSS IGE QN: <0.1 KU/L
DEPRECATED CEDAR IGE RAST QL: NORMAL
DEPRECATED TIMOTHY IGE RAST QL: NORMAL
DOG DANDER IGE QN: <0.1 KU/L
ENGL PLANTAIN IGE QN: <0.1 KU/L
PECAN/HICK TREE IGE QN: <0.1 KU/L
RAST CLASS: NORMAL
TIMOTHY IGE QN: <0.1 KU/L
WEST RAGWEED IGE QN: <0.1 KU/L
WHITE OAK IGE QN: <0.1 KU/L

## 2025-01-27 PROBLEM — J31.0 CHRONIC RHINITIS: Status: ACTIVE | Noted: 2025-01-27

## 2025-02-19 ENCOUNTER — OFFICE VISIT (OUTPATIENT)
Dept: GASTROENTEROLOGY | Facility: CLINIC | Age: 70
End: 2025-02-19
Payer: MEDICARE

## 2025-02-19 ENCOUNTER — TELEPHONE (OUTPATIENT)
Dept: ENDOSCOPY | Facility: HOSPITAL | Age: 70
End: 2025-02-19
Payer: MEDICARE

## 2025-02-19 ENCOUNTER — RESULTS FOLLOW-UP (OUTPATIENT)
Dept: GASTROENTEROLOGY | Facility: HOSPITAL | Age: 70
End: 2025-02-19
Payer: MEDICARE

## 2025-02-19 ENCOUNTER — LAB VISIT (OUTPATIENT)
Dept: LAB | Facility: HOSPITAL | Age: 70
End: 2025-02-19
Payer: MEDICARE

## 2025-02-19 VITALS
HEART RATE: 117 BPM | WEIGHT: 130.31 LBS | BODY MASS INDEX: 23.98 KG/M2 | DIASTOLIC BLOOD PRESSURE: 87 MMHG | SYSTOLIC BLOOD PRESSURE: 139 MMHG | HEIGHT: 62 IN

## 2025-02-19 DIAGNOSIS — R10.9 ABDOMINAL PAIN, UNSPECIFIED ABDOMINAL LOCATION: Primary | ICD-10-CM

## 2025-02-19 DIAGNOSIS — R10.9 ABDOMINAL PAIN, UNSPECIFIED ABDOMINAL LOCATION: ICD-10-CM

## 2025-02-19 DIAGNOSIS — K21.9 GASTROESOPHAGEAL REFLUX DISEASE, UNSPECIFIED WHETHER ESOPHAGITIS PRESENT: ICD-10-CM

## 2025-02-19 LAB
ALBUMIN SERPL BCP-MCNC: 4.4 G/DL (ref 3.5–5.2)
ALP SERPL-CCNC: 88 U/L (ref 40–150)
ALT SERPL W/O P-5'-P-CCNC: 40 U/L (ref 10–44)
ANION GAP SERPL CALC-SCNC: 13 MMOL/L (ref 8–16)
AST SERPL-CCNC: 38 U/L (ref 10–40)
BASOPHILS # BLD AUTO: 0.04 K/UL (ref 0–0.2)
BASOPHILS NFR BLD: 0.7 % (ref 0–1.9)
BILIRUB SERPL-MCNC: 0.8 MG/DL (ref 0.1–1)
BUN SERPL-MCNC: 14 MG/DL (ref 8–23)
CALCIUM SERPL-MCNC: 9.7 MG/DL (ref 8.7–10.5)
CHLORIDE SERPL-SCNC: 100 MMOL/L (ref 95–110)
CO2 SERPL-SCNC: 21 MMOL/L (ref 23–29)
CREAT SERPL-MCNC: 1.2 MG/DL (ref 0.5–1.4)
DIFFERENTIAL METHOD BLD: ABNORMAL
EOSINOPHIL # BLD AUTO: 0.1 K/UL (ref 0–0.5)
EOSINOPHIL NFR BLD: 1.6 % (ref 0–8)
ERYTHROCYTE [DISTWIDTH] IN BLOOD BY AUTOMATED COUNT: 13.2 % (ref 11.5–14.5)
EST. GFR  (NO RACE VARIABLE): 49.6 ML/MIN/1.73 M^2
GLUCOSE SERPL-MCNC: 91 MG/DL (ref 70–110)
HCT VFR BLD AUTO: 39 % (ref 37–48.5)
HGB BLD-MCNC: 13.3 G/DL (ref 12–16)
IGA SERPL-MCNC: 200 MG/DL (ref 40–350)
IMM GRANULOCYTES # BLD AUTO: 0.1 K/UL (ref 0–0.04)
IMM GRANULOCYTES NFR BLD AUTO: 1.8 % (ref 0–0.5)
LIPASE SERPL-CCNC: 14 U/L (ref 4–60)
LYMPHOCYTES # BLD AUTO: 0.9 K/UL (ref 1–4.8)
LYMPHOCYTES NFR BLD: 15.8 % (ref 18–48)
MCH RBC QN AUTO: 33.5 PG (ref 27–31)
MCHC RBC AUTO-ENTMCNC: 34.1 G/DL (ref 32–36)
MCV RBC AUTO: 98 FL (ref 82–98)
MONOCYTES # BLD AUTO: 0.7 K/UL (ref 0.3–1)
MONOCYTES NFR BLD: 11.5 % (ref 4–15)
NEUTROPHILS # BLD AUTO: 3.9 K/UL (ref 1.8–7.7)
NEUTROPHILS NFR BLD: 68.6 % (ref 38–73)
NRBC BLD-RTO: 0 /100 WBC
PLATELET # BLD AUTO: 171 K/UL (ref 150–450)
PMV BLD AUTO: 9.7 FL (ref 9.2–12.9)
POTASSIUM SERPL-SCNC: 4.7 MMOL/L (ref 3.5–5.1)
PROT SERPL-MCNC: 7.8 G/DL (ref 6–8.4)
RBC # BLD AUTO: 3.97 M/UL (ref 4–5.4)
SODIUM SERPL-SCNC: 134 MMOL/L (ref 136–145)
WBC # BLD AUTO: 5.63 K/UL (ref 3.9–12.7)

## 2025-02-19 PROCEDURE — 80053 COMPREHEN METABOLIC PANEL: CPT

## 2025-02-19 PROCEDURE — 36415 COLL VENOUS BLD VENIPUNCTURE: CPT

## 2025-02-19 PROCEDURE — 99999 PR PBB SHADOW E&M-EST. PATIENT-LVL V: CPT | Mod: PBBFAC,GC,,

## 2025-02-19 PROCEDURE — 82784 ASSAY IGA/IGD/IGG/IGM EACH: CPT

## 2025-02-19 PROCEDURE — 85025 COMPLETE CBC W/AUTO DIFF WBC: CPT

## 2025-02-19 PROCEDURE — 86364 TISS TRNSGLTMNASE EA IG CLAS: CPT

## 2025-02-19 PROCEDURE — 83690 ASSAY OF LIPASE: CPT

## 2025-02-19 NOTE — H&P (VIEW-ONLY)
"[facility]    Name: Essence Tapia  : 1955  Date of Service: 2025   PCP: Easton Sadler MD    Reason for visit:   Chief Complaint   Patient presents with    Bloated    Abdominal Cramping       HPI:     The patient is a 69 year old female with PMHx of hypothyroid, insomnia, complicated diverticulitis s/p sigmoid colon resection in , pancreatitis 2/2 ozempic use in , who presents to GI clinic for evaluation of abdominal pain, bloating and nausea. Her diverticulitis required a long admission with bacteremia treatment, etc. She fully resolved from that infection and had a colonoscopy by Dr. Simeon in  showing diverticulosis but no polyps or other abnormal findings. She took ozempic last year for a short while to lose "a few pounds" and had onset of acute pancreatitis but this resolved with standard therapy of IV fluids. She then had complete resolution of that pain as well. She takes metamucil powder daily and has regular BM once a day. She has been feeling well until last week when she was with her grandkids and had sudden onset of extreme nausea, but no vomiting. She has tried phenergan and zofran but says they are barely helping. She has not lost any weight, in fact thinks she's up approximately 10 lbs in the last several months. On exam, right lower quadrant seems to be the only place I can elucidate pain. She has had her appendix removed but still has a GB. No significant post prandial epigastric discomfort, no melena, hematochezia, hematemesis, constipation or diarrhea. She drinks one glass of wine occasionally.     Most Recent Upper Endoscopy:   Reports one prior egd with a Dr. Orr but I dont have access    Most Recent Colonoscopy:   In our records, diverticulosis throughout the descending and transverse colon. Patent end-to-side colorectal anastomosis. Otherwise normal exam.    Review of Systems:   Review of Systems   Constitutional:  Negative " "for chills, fever and weight loss.   Gastrointestinal:  Positive for abdominal pain and nausea. Negative for blood in stool, constipation, diarrhea, heartburn, melena and vomiting.     Medications: Current Medications[1]     Past medical history, past surgical history, family history, allergies, and social history reviewed and updated in EMR.     Vitals:   Vitals:    02/19/25 1257   BP: 139/87   Patient Position: Sitting   Pulse: (!) 117   Weight: 59.1 kg (130 lb 4.7 oz)   Height: 5' 2" (1.575 m)     Body mass index is 23.83 kg/m².    Physical Exam:   Physical Exam  Constitutional:       General: She is not in acute distress.  HENT:      Head: Normocephalic and atraumatic.   Pulmonary:      Effort: Pulmonary effort is normal.   Abdominal:      General: Abdomen is flat. There is no distension.      Palpations: Abdomen is soft. There is no mass.      Tenderness: There is abdominal tenderness. There is no guarding or rebound.      Hernia: No hernia is present.   Neurological:      Mental Status: She is alert.     Assessment:   1. Abdominal pain, unspecified abdominal location  CBC W/ AUTO DIFFERENTIAL    Comprehensive Metabolic Panel    Lipase    CT Enterography Abd_Pelvis With Contrast    US Abdomen Limited    Tissue Transglutaminase, IgA    IgA      69 year old female with PMHx of hypothyroid, insomnia, complicated diverticulitis s/p sigmoid colon resection in 2018, pancreatitis 2/2 ozempic use in 2024, who presents to GI clinic for evaluation of abdominal pain, bloating and nausea.    Plan:   - CT enterography  - RUQ U/S   - CBC, CMP, lipase, celiac panel  - EGD to be scheduled at patients convenience    Disposition: Follow-up in 4 weeks, after EGD    Discussed with staff attending. Attestation to follow.     Candida Bustillo MD  Gastroenterology and Hepatology Fellow, PGY-4  Pager # [phone]           [1]    Current Outpatient Medications:     azelastine (ASTELIN) 137 mcg (0.1 %) nasal spray, 2 sprays (274 " mcg total) by Nasal route 2 (two) times daily as needed. Donot snort (because it tastes bad), Disp: 30 mL, Rfl: 11    bismuth subsalicylate (BISMUTH) 262 mg Chew, Take by mouth 2 (two) times daily as needed (stomach upset)., Disp: , Rfl:     brimonidine tartrate (LUMIFY OPHT), Place 1 drop into both eyes daily as needed (redness)., Disp: , Rfl:     hydrOXYzine HCL (ATARAX) 25 MG tablet, Take 1 tablet (25 mg total) by mouth daily as needed for Anxiety., Disp: 90 tablet, Rfl: 1    ibuprofen (ADVIL) 200 MG tablet, Take 400 mg by mouth daily as needed (headache)., Disp: , Rfl:     ipratropium (ATROVENT) 42 mcg (0.06 %) nasal spray, 2 sprays by Each Nostril route 2 (two) times daily as needed., Disp: 15 mL, Rfl: 5    levothyroxine (SYNTHROID) 88 MCG tablet, TAKE 1 TABLET BY MOUTH EVERY DAY, Disp: 90 tablet, Rfl: 3    lisinopriL-hydrochlorothiazide (PRINZIDE,ZESTORETIC) 10-12.5 mg per tablet, Take 1 tablet by mouth once daily., Disp: 90 tablet, Rfl: 0    loratadine (CLARITIN) 10 mg tablet, Take 10 mg by mouth daily as needed for Allergies., Disp: , Rfl:     promethazine (PHENERGAN) 25 MG tablet, TAKE 1 TABLET BY MOUTH EVERY 6 HOURS AS NEEDED FOR NAUSEA, Disp: 120 tablet, Rfl: 0    psyllium (METAMUCIL SUGAR FREE) Powd, Take by mouth. Place 1 tbsp into beverage and drink daily, Disp: , Rfl:     rosuvastatin (CRESTOR) 5 MG tablet, Take 1 tablet (5 mg total) by mouth once daily., Disp: 90 tablet, Rfl: 3    temazepam (RESTORIL) 15 mg Cap, Take 2 capsules (30 mg total) by mouth nightly as needed (sleep disturbance)., Disp: 60 capsule, Rfl: 3    UNABLE TO FIND, Take 1 tablet by mouth Daily. medication name: BIO COMPLETE 3, Disp: , Rfl:

## 2025-02-19 NOTE — TELEPHONE ENCOUNTER
Referral for procedure from [facility]      Spoke to patient to schedule procedure(s) Upper Endoscopy (EGD)       Physician to perform procedure(s) Dr. EDWARD Doyle  Date of Procedure (s) 02/28/25  Arrival Time 12:00 PM  Time of Procedure(s) 1:00 PM   Location of Procedure(s) [place] 2nd Floor  Type of Rx Prep sent to patient: N/A  Instructions provided to patient via Copy in hand    Patient was informed on the following information and verbalized understanding. Screening questionnaire reviewed with patient and complete. If procedure requires anesthesia, a responsible adult needs to be present to accompany the patient home, patient cannot drive after receiving anesthesia. Appointment details are tentative, especially check-in time. Patient will receive a prep-op call 7 days prior to confirm check-in time for procedure. If applicable the patient should contact their pharmacy to verify Rx for procedure prep is ready for pick-up. Patient was advised to call the scheduling department at [phone] if pharmacy states no Rx is available. Patient was advised to call the endoscopy scheduling department if any questions or concerns arise.       Endoscopy Scheduling Department

## 2025-02-19 NOTE — PROGRESS NOTES
"GENERAL GI PATIENT INTAKE:    COVID symptoms in the last 7 days (runny nose, sore throat, congestion, cough, fever): No  PCP: Easton Sadler  If not PCP-  number given to establish [phone]: N/A    ALLERGIES REVIEWED:  Yes    CHIEF COMPLAINT:    Chief Complaint   Patient presents with    Bloated    Abdominal Cramping       VITAL SIGNS:  /87 (Patient Position: Sitting)   Pulse (!) 117   Ht 5' 2" (1.575 m)   Wt 59.1 kg (130 lb 4.7 oz)   BMI 23.83 kg/m²      Change in medical, surgical, family or social history:  Reviewed by MD      REVIEWED MEDICATION LIST RECONCILED INCLUDING ABOVE MEDS:  Yes     "

## 2025-02-19 NOTE — TELEPHONE ENCOUNTER
"Message  Received: Today  Candida Bustillo MD  P [facility]  Caller: Unspecified (Today,  1:22 PM)  Procedure: EGD    Diagnosis: Abdominal pain    Procedure Timin-12 weeks    *If within 4 weeks selected, please tam as high priority*    *If greater than 12 weeks, please select "5-12 weeks" and delay sending until 2 months prior to requested date*    Provider: Any GI provider    Location: Any Site    Additional Scheduling Information: No scheduling concerns    Prep Specifications:N/A    Is the patient taking a GLP-1 Agonist:no    Have you attached a patient to this message: yes  "

## 2025-02-19 NOTE — PROGRESS NOTES
"[facility]    Name: Essence Tapia  : 1955  Date of Service: 2025   PCP: Easton Sadler MD    Reason for visit:   Chief Complaint   Patient presents with    Bloated    Abdominal Cramping       HPI:     The patient is a 69 year old female with PMHx of hypothyroid, insomnia, complicated diverticulitis s/p sigmoid colon resection in , pancreatitis 2/2 ozempic use in , who presents to GI clinic for evaluation of abdominal pain, bloating and nausea. Her diverticulitis required a long admission with bacteremia treatment, etc. She fully resolved from that infection and had a colonoscopy by Dr. Simeon in  showing diverticulosis but no polyps or other abnormal findings. She took ozempic last year for a short while to lose "a few pounds" and had onset of acute pancreatitis but this resolved with standard therapy of IV fluids. She then had complete resolution of that pain as well. She takes metamucil powder daily and has regular BM once a day. She has been feeling well until last week when she was with her grandkids and had sudden onset of extreme nausea, but no vomiting. She has tried phenergan and zofran but says they are barely helping. She has not lost any weight, in fact thinks she's up approximately 10 lbs in the last several months. On exam, right lower quadrant seems to be the only place I can elucidate pain. She has had her appendix removed but still has a GB. No significant post prandial epigastric discomfort, no melena, hematochezia, hematemesis, constipation or diarrhea. She drinks one glass of wine occasionally.     Most Recent Upper Endoscopy:   Reports one prior egd with a Dr. Orr but I dont have access    Most Recent Colonoscopy:   In our records, diverticulosis throughout the descending and transverse colon. Patent end-to-side colorectal anastomosis. Otherwise normal exam.    Review of Systems:   Review of Systems   Constitutional:  Negative " "for chills, fever and weight loss.   Gastrointestinal:  Positive for abdominal pain and nausea. Negative for blood in stool, constipation, diarrhea, heartburn, melena and vomiting.     Medications: Current Medications[1]     Past medical history, past surgical history, family history, allergies, and social history reviewed and updated in EMR.     Vitals:   Vitals:    02/19/25 1257   BP: 139/87   Patient Position: Sitting   Pulse: (!) 117   Weight: 59.1 kg (130 lb 4.7 oz)   Height: 5' 2" (1.575 m)     Body mass index is 23.83 kg/m².    Physical Exam:   Physical Exam  Constitutional:       General: She is not in acute distress.  HENT:      Head: Normocephalic and atraumatic.   Pulmonary:      Effort: Pulmonary effort is normal.   Abdominal:      General: Abdomen is flat. There is no distension.      Palpations: Abdomen is soft. There is no mass.      Tenderness: There is abdominal tenderness. There is no guarding or rebound.      Hernia: No hernia is present.   Neurological:      Mental Status: She is alert.     Assessment:   1. Abdominal pain, unspecified abdominal location  CBC W/ AUTO DIFFERENTIAL    Comprehensive Metabolic Panel    Lipase    CT Enterography Abd_Pelvis With Contrast    US Abdomen Limited    Tissue Transglutaminase, IgA    IgA      69 year old female with PMHx of hypothyroid, insomnia, complicated diverticulitis s/p sigmoid colon resection in 2018, pancreatitis 2/2 ozempic use in 2024, who presents to GI clinic for evaluation of abdominal pain, bloating and nausea.    Plan:   - CT enterography  - RUQ U/S   - CBC, CMP, lipase, celiac panel  - EGD to be scheduled at patients convenience    Disposition: Follow-up in 4 weeks, after EGD    Discussed with staff attending. Attestation to follow.     Candida Bustillo MD  Gastroenterology and Hepatology Fellow, PGY-4  Pager # [phone]           [1]    Current Outpatient Medications:     azelastine (ASTELIN) 137 mcg (0.1 %) nasal spray, 2 sprays (274 " mcg total) by Nasal route 2 (two) times daily as needed. Donot snort (because it tastes bad), Disp: 30 mL, Rfl: 11    bismuth subsalicylate (BISMUTH) 262 mg Chew, Take by mouth 2 (two) times daily as needed (stomach upset)., Disp: , Rfl:     brimonidine tartrate (LUMIFY OPHT), Place 1 drop into both eyes daily as needed (redness)., Disp: , Rfl:     hydrOXYzine HCL (ATARAX) 25 MG tablet, Take 1 tablet (25 mg total) by mouth daily as needed for Anxiety., Disp: 90 tablet, Rfl: 1    ibuprofen (ADVIL) 200 MG tablet, Take 400 mg by mouth daily as needed (headache)., Disp: , Rfl:     ipratropium (ATROVENT) 42 mcg (0.06 %) nasal spray, 2 sprays by Each Nostril route 2 (two) times daily as needed., Disp: 15 mL, Rfl: 5    levothyroxine (SYNTHROID) 88 MCG tablet, TAKE 1 TABLET BY MOUTH EVERY DAY, Disp: 90 tablet, Rfl: 3    lisinopriL-hydrochlorothiazide (PRINZIDE,ZESTORETIC) 10-12.5 mg per tablet, Take 1 tablet by mouth once daily., Disp: 90 tablet, Rfl: 0    loratadine (CLARITIN) 10 mg tablet, Take 10 mg by mouth daily as needed for Allergies., Disp: , Rfl:     promethazine (PHENERGAN) 25 MG tablet, TAKE 1 TABLET BY MOUTH EVERY 6 HOURS AS NEEDED FOR NAUSEA, Disp: 120 tablet, Rfl: 0    psyllium (METAMUCIL SUGAR FREE) Powd, Take by mouth. Place 1 tbsp into beverage and drink daily, Disp: , Rfl:     rosuvastatin (CRESTOR) 5 MG tablet, Take 1 tablet (5 mg total) by mouth once daily., Disp: 90 tablet, Rfl: 3    temazepam (RESTORIL) 15 mg Cap, Take 2 capsules (30 mg total) by mouth nightly as needed (sleep disturbance)., Disp: 60 capsule, Rfl: 3    UNABLE TO FIND, Take 1 tablet by mouth Daily. medication name: BIO COMPLETE 3, Disp: , Rfl:

## 2025-02-19 NOTE — PROGRESS NOTES
Attestation signed by Alverto Bay MD at 2/19/2025  1:58 PM     I have seen the patient, reviewed Candida Bustillo MD  the GI fellow's history and physical, assessment, and plan. I have personally interviewed and examined the patient at bedside and I discussed the case with the GI fellow and I agree with the findings and plan as documented in the fellow's note besides follow-up recommend follow-up in 4 weeks not 3 months.

## 2025-02-20 ENCOUNTER — ANESTHESIA EVENT (OUTPATIENT)
Dept: ENDOSCOPY | Facility: HOSPITAL | Age: 70
End: 2025-02-20
Payer: MEDICARE

## 2025-02-20 ENCOUNTER — HOSPITAL ENCOUNTER (OUTPATIENT)
Dept: RADIOLOGY | Facility: HOSPITAL | Age: 70
Discharge: HOME | End: 2025-02-20
Payer: MEDICARE

## 2025-02-20 DIAGNOSIS — R10.9 ABDOMINAL PAIN, UNSPECIFIED ABDOMINAL LOCATION: Primary | ICD-10-CM

## 2025-02-20 DIAGNOSIS — R10.9 ABDOMINAL PAIN, UNSPECIFIED ABDOMINAL LOCATION: ICD-10-CM

## 2025-02-20 PROCEDURE — 76705 ECHO EXAM OF ABDOMEN: CPT | Mod: TC

## 2025-02-20 NOTE — ANESTHESIA PREPROCEDURE EVALUATION
02/20/2025  Essence Tapia is a 69 y.o., female.    Procedure: EGD (ESOPHAGOGASTRODUODENOSCOPY) (N/A)       Problem List[1]    Past Medical History:   Diagnosis Date    Diverticulitis     Edema     Hypertension     Spinal stenosis     Thyroid disease        ECHO: No results found for this or any previous visit.      There is no height or weight on file to calculate BMI.    Tobacco Use: Low Risk  (2/19/2025)    Patient History     Smoking Tobacco Use: Never     Smokeless Tobacco Use: Never     Passive Exposure: Never       Social History     Substance and Sexual Activity   Drug Use No        Alcohol Use: Not At Risk (2/18/2025)    AUDIT-C     Frequency of Alcohol Consumption: 2-3 times a week     Average Number of Drinks: 1 or 2     Frequency of Binge Drinking: Never       Review of patient's allergies indicates:   Allergen Reactions    Codeine     Hydrocodone Hives, Nausea And Vomiting and Other (See Comments)     hallucinations    Hydromorphone Other (See Comments)     hallucinations    Latex Itching    Tramadol          Airway:  No value filed.    Pre-op Assessment    I have reviewed the Patient Summary Reports.    I have reviewed the NPO Status.   I have reviewed the Medications.     Review of Systems  Anesthesia Hx:             Denies Family Hx of Anesthesia complications.    Denies Personal Hx of Anesthesia complications.                    Hematology/Oncology:  Hematology Normal   Oncology Normal                                   EENT/Dental:  EENT/Dental Normal           Cardiovascular:     Hypertension                                          Pulmonary:  Pulmonary Normal                       Renal/:  Renal/ Normal                 Hepatic/GI:  Hepatic/GI Normal                    Musculoskeletal:  Musculoskeletal Normal                Neurological:  Neurology Normal                                       Endocrine:   Hypothyroidism          Dermatological:  Skin Normal    Psych:   anxiety               Physical Exam  General: Well nourished    Airway:  Mallampati: II   Mouth Opening: Normal  TM Distance: Normal    Chest/Lungs:  Normal Respiratory Rate    Heart:  Rate: Normal    Anesthesia Plan  Type of Anesthesia, risks & benefits discussed:    Anesthesia Type: Gen Natural Airway  Intra-op Monitoring Plan: Standard ASA Monitors  Post Op Pain Control Plan: multimodal analgesia  Induction:  IV  Informed Consent: Informed consent signed with the Patient and all parties understand the risks and agree with anesthesia plan.  All questions answered.   ASA Score: 2  Day of Surgery Review of History & Physical: H&P Update referred to the surgeon/provider.    Ready For Surgery From Anesthesia Perspective.     .             [1]  Patient Active Problem List  Diagnosis    Diverticular disease    History of pancreatitis    Hypertension    Hyposomnia, insomnia or sleeplessness associated with anxiety    Hypothyroidism    Screening for colon cancer    Chest pain    Alcohol abuse    Drug-induced acute pancreatitis    Anxiety    Aortic atherosclerosis    Chronic rhinitis with negative Immunocaps

## 2025-02-24 ENCOUNTER — NURSE TRIAGE (OUTPATIENT)
Dept: ADMINISTRATIVE | Facility: CLINIC | Age: 70
End: 2025-02-24
Payer: MEDICARE

## 2025-02-24 DIAGNOSIS — K76.0 HEPATIC STEATOSIS: Primary | ICD-10-CM

## 2025-02-24 LAB — TTG IGA SER-ACNC: 0.4 U/ML

## 2025-02-24 NOTE — PROGRESS NOTES
Referral to hepatology placed for advanced hepatic steatosis.     Candida Bustillo MD  Gastroenterology and Hepatology Fellow, PGY-4

## 2025-02-24 NOTE — TELEPHONE ENCOUNTER
Patient scheduled for EGD later this week and wanted to verify prep. Discussed instructions per epic.  Day Before Procedure: 02/27/25     You may have a light evening meal.   No solid food after 7:00 pm.   Continue drinking clear liquids.       Day of the Procedure: 02/27/25     You may have water/clear liquids until 2 hours before your procedure or as directed by the scheduling nurse 11:00AM. See below for list.    What You CANNOT do:   Do not drink milk or anything colored red.  Do not drink alcohol.  No gum chewing or candy morning of procedure.    Liquids That Are OK to Drink:   Water  Sports drinks (Gatorade, Power-Aid)  Coffee or tea (no cream or nondairy creamer)  Clear juices without pulp (apple, white grape)  Gelatin desserts (no fruit or toppings)  Clear soda (sprite, coke, ginger ale)  Chicken broth (until 12 midnight the night before procedure)     All questions answered.Instructed to call back with additional questions or worsening of symptoms. Patient verbalized understanding.     Reason for Disposition   Question about upcoming scheduled surgery, procedure or test, no triage required, and triager able to answer question    Protocols used: Information Only Call - No Triage-A-

## 2025-02-25 ENCOUNTER — TELEPHONE (OUTPATIENT)
Dept: GASTROENTEROLOGY | Facility: CLINIC | Age: 70
End: 2025-02-25
Payer: MEDICARE

## 2025-02-25 NOTE — TELEPHONE ENCOUNTER
----- Message from Alisha sent at 2/24/2025  4:50 PM CST -----  Name of Who is Calling: Pt   What is the request in detail:Pt would like a call back in regards to instructions and time for 2/28/25 procedure. Please advise   Can the clinic reply by [facility]:no  What Number to Call Back if not in [facility]:Telephone Information:Mobile          [phone]

## 2025-02-26 ENCOUNTER — PATIENT MESSAGE (OUTPATIENT)
Dept: INTERNAL MEDICINE | Facility: CLINIC | Age: 70
End: 2025-02-26
Payer: MEDICARE

## 2025-02-26 ENCOUNTER — TELEPHONE (OUTPATIENT)
Dept: ENDOSCOPY | Facility: HOSPITAL | Age: 70
End: 2025-02-26
Payer: MEDICARE

## 2025-02-26 DIAGNOSIS — F41.9 HYPOSOMNIA, INSOMNIA OR SLEEPLESSNESS ASSOCIATED WITH ANXIETY: ICD-10-CM

## 2025-02-26 DIAGNOSIS — F51.05 HYPOSOMNIA, INSOMNIA OR SLEEPLESSNESS ASSOCIATED WITH ANXIETY: ICD-10-CM

## 2025-02-26 RX ORDER — TEMAZEPAM 15 MG/1
30 CAPSULE ORAL NIGHTLY PRN
Qty: 60 CAPSULE | Refills: 0 | Status: SHIPPED | OUTPATIENT
Start: 2025-02-26 | End: 2025-03-30

## 2025-02-27 ENCOUNTER — TELEPHONE (OUTPATIENT)
Dept: ENDOSCOPY | Facility: HOSPITAL | Age: 70
End: 2025-02-27
Payer: MEDICARE

## 2025-02-27 RX ORDER — POTASSIUM CHLORIDE 750 MG/1
10 TABLET, EXTENDED RELEASE ORAL DAILY
COMMUNITY
End: 2025-02-27 | Stop reason: SDUPTHER

## 2025-02-27 RX ORDER — POTASSIUM CHLORIDE 750 MG/1
10 TABLET, EXTENDED RELEASE ORAL DAILY
Qty: 90 TABLET | Refills: 0 | Status: SHIPPED | OUTPATIENT
Start: 2025-02-27

## 2025-02-28 ENCOUNTER — NURSE TRIAGE (OUTPATIENT)
Dept: ADMINISTRATIVE | Facility: CLINIC | Age: 70
End: 2025-02-28
Payer: MEDICARE

## 2025-02-28 ENCOUNTER — HOSPITAL ENCOUNTER (OUTPATIENT)
Facility: HOSPITAL | Age: 70
Discharge: HOME | End: 2025-02-28
Attending: INTERNAL MEDICINE | Admitting: INTERNAL MEDICINE
Payer: MEDICARE

## 2025-02-28 ENCOUNTER — ANESTHESIA (OUTPATIENT)
Dept: ENDOSCOPY | Facility: HOSPITAL | Age: 70
End: 2025-02-28
Payer: MEDICARE

## 2025-02-28 VITALS
HEART RATE: 67 BPM | WEIGHT: 120 LBS | OXYGEN SATURATION: 96 % | TEMPERATURE: 98 F | RESPIRATION RATE: 18 BRPM | DIASTOLIC BLOOD PRESSURE: 79 MMHG | HEIGHT: 62 IN | SYSTOLIC BLOOD PRESSURE: 126 MMHG | BODY MASS INDEX: 22.08 KG/M2

## 2025-02-28 DIAGNOSIS — R10.13 EPIGASTRIC ABDOMINAL PAIN: Primary | ICD-10-CM

## 2025-02-28 DIAGNOSIS — R10.9 ABDOMINAL PAIN: ICD-10-CM

## 2025-02-28 PROCEDURE — 99900035 HC TECH TIME PER 15 MIN (STAT)

## 2025-02-28 PROCEDURE — 43239 EGD BIOPSY SINGLE/MULTIPLE: CPT | Performed by: INTERNAL MEDICINE

## 2025-02-28 PROCEDURE — 25000003 PHARM REV CODE 250: Performed by: INTERNAL MEDICINE

## 2025-02-28 PROCEDURE — 63600175 PHARM REV CODE 636 W HCPCS: Performed by: NURSE ANESTHETIST, CERTIFIED REGISTERED

## 2025-02-28 PROCEDURE — 27201012 HC FORCEPS, HOT/COLD, DISP: Performed by: INTERNAL MEDICINE

## 2025-02-28 PROCEDURE — 43239 EGD BIOPSY SINGLE/MULTIPLE: CPT | Mod: ,,, | Performed by: INTERNAL MEDICINE

## 2025-02-28 PROCEDURE — 88305 TISSUE EXAM BY PATHOLOGIST: CPT | Performed by: PATHOLOGY

## 2025-02-28 PROCEDURE — 37000008 HC ANESTHESIA 1ST 15 MINUTES: Performed by: INTERNAL MEDICINE

## 2025-02-28 PROCEDURE — 94761 N-INVAS EAR/PLS OXIMETRY MLT: CPT

## 2025-02-28 RX ORDER — LIDOCAINE HYDROCHLORIDE 20 MG/ML
INJECTION INTRAVENOUS
Status: DISCONTINUED | OUTPATIENT
Start: 2025-02-28 | End: 2025-02-28

## 2025-02-28 RX ORDER — SODIUM CHLORIDE 9 MG/ML
INJECTION, SOLUTION INTRAVENOUS CONTINUOUS
Status: DISCONTINUED | OUTPATIENT
Start: 2025-02-28 | End: 2025-02-28 | Stop reason: HOSPADM

## 2025-02-28 RX ORDER — PROPOFOL 10 MG/ML
VIAL (ML) INTRAVENOUS
Status: DISCONTINUED | OUTPATIENT
Start: 2025-02-28 | End: 2025-02-28

## 2025-02-28 RX ORDER — PROPOFOL 10 MG/ML
VIAL (ML) INTRAVENOUS CONTINUOUS PRN
Status: DISCONTINUED | OUTPATIENT
Start: 2025-02-28 | End: 2025-02-28

## 2025-02-28 RX ADMIN — PROPOFOL 175 MCG/KG/MIN: 10 INJECTION, EMULSION INTRAVENOUS at 12:02

## 2025-02-28 RX ADMIN — SODIUM CHLORIDE: 9 INJECTION, SOLUTION INTRAVENOUS at 12:02

## 2025-02-28 RX ADMIN — PROPOFOL 70 MG: 10 INJECTION, EMULSION INTRAVENOUS at 12:02

## 2025-02-28 RX ADMIN — LIDOCAINE HYDROCHLORIDE 100 MG: 20 INJECTION INTRAVENOUS at 12:02

## 2025-02-28 NOTE — INTERVAL H&P NOTE
The patient has been examined and the H&P has been reviewed:    I concur with the findings and no changes have occurred since H&P was written.    Procedure risks, benefits and alternative options discussed and understood by patient/family.      EGD: Abdominal pain  Sedation: GA  ASA: Per anesthesia  Mallampati: Per anesthesia    There are no hospital problems to display for this patient.

## 2025-02-28 NOTE — PLAN OF CARE
Pt arrived to recovery dosc via stretcher per GI team. Bedside report received. Pt attached to bedside monitor. VSS. Pt sedated, moves extremeties___ post procedure. Pt on room air oxygen; sats _99___%. Pt IV access infusing NS. Will continue to monitor.

## 2025-02-28 NOTE — TRANSFER OF CARE
"Anesthesia Transfer of Care Note    Patient: Essence Tapia    Procedure(s) Performed: Procedure(s) (LRB):  EGD (ESOPHAGOGASTRODUODENOSCOPY) (N/A)    Patient location: PACU    Anesthesia Type: general    Transport from OR: Transported from OR on room air with adequate spontaneous ventilation    Post pain: adequate analgesia    Post assessment: no apparent anesthetic complications and tolerated procedure well    Post vital signs: stable    Level of consciousness: sedated and responds to stimulation    Nausea/Vomiting: no nausea/vomiting    Complications: none    Transfer of care protocol was followed      Last vitals: Visit Vitals  /75 (BP Location: Left arm, Patient Position: Lying)   Pulse 68   Temp 36.6 °C (97.9 °F) (Temporal)   Resp 16   Ht 5' 2" (1.575 m)   Wt 54.4 kg (120 lb)   SpO2 98%   Breastfeeding No   BMI 21.95 kg/m²     "

## 2025-02-28 NOTE — PROVATION PATIENT INSTRUCTIONS
Discharge Summary/Instructions after an Endoscopic Procedure  Patient Name: Essence Tapia  Patient MRN: [id number]  Patient YOB: 1955  Friday, February 28, 2025  Jann Doyle MD  Dear patient,  As a result of recent federal legislation (The Federal Cures Act), you may   receive lab or pathology results from your procedure in your [facility]   account before your physician is able to contact you. Your physician or   their representative will relay the results to you with their   recommendations at their soonest availability.  Thank you,  RESTRICTIONS:  During your procedure today, you received medications for sedation.  These   medications may affect your judgment, balance and coordination.  Therefore,   for 24 hours, you have the following restrictions:   - DO NOT drive a car, operate machinery, make legal/financial decisions,   sign important papers or drink alcohol.    ACTIVITY:  Today: no heavy lifting, straining or running due to procedural   sedation/anesthesia.  The following day: return to full activity including work.  DIET:  Eat and drink normally unless instructed otherwise.     TREATMENT FOR COMMON SIDE EFFECTS:  - Mild abdominal pain, nausea, belching, bloating or excessive gas:  rest,   eat lightly and use a heating pad.  - Sore Throat: treat with throat lozenges and/or gargle with warm salt   water.  - Because air was used during the procedure, expelling large amounts of air   from your rectum or belching is normal.  - If a bowel prep was taken, you may not have a bowel movement for 1-3 days.    This is normal.  SYMPTOMS TO WATCH FOR AND REPORT TO YOUR PHYSICIAN:  1. Abdominal pain or bloating, other than gas cramps.  2. Chest pain.  3. Back pain.  4. Signs of infection such as: chills or fever occurring within 24 hours   after the procedure.  5. Rectal bleeding, which would show as bright red, maroon, or black stools.   (A tablespoon of blood from the rectum is not serious, especially  if   hemorrhoids are present.)  6. Vomiting.  7. Weakness or dizziness.  GO DIRECTLY TO THE NEAREST EMERGENCY ROOM IF YOU HAVE ANY OF THE FOLLOWING:      Difficulty breathing              Chills and/or fever over 101 F   Persistent vomiting and/or vomiting blood   Severe abdominal pain   Severe chest pain   Black, tarry stools   Bleeding- more than one tablespoon   Any other symptom or condition that you feel may need urgent attention  Your doctor recommends these additional instructions:  If any biopsies were taken, your doctors clinic will contact you in 1 to 2   weeks with any results.  - Patient has a contact number available for emergencies.  The signs and   symptoms of potential delayed complications were discussed with the   patient.  Return to normal activities tomorrow.  Written discharge   instructions were provided to the patient.   - Discharge patient to home.   - Resume previous diet.   - Continue present medications.   - Await pathology results.   For questions, problems or results please call your physician - Jann Doyle MD at Work:  [phone].  [facility] [place], EMERGENCY ROOM PHONE NUMBER: [phone]  IF A COMPLICATION OR EMERGENCY SITUATION ARISES AND YOU ARE UNABLE TO REACH   YOUR PHYSICIAN - GO DIRECTLY TO THE EMERGENCY ROOM.  Jann Doyle MD  2/28/2025 12:56:42 PM  This report has been verified and signed electronically.  Dear patient,  As a result of recent federal legislation (The Federal Cures Act), you may   receive lab or pathology results from your procedure in your [facility]   account before your physician is able to contact you. Your physician or   their representative will relay the results to you with their   recommendations at their soonest availability.  Thank you,  PROVATION

## 2025-02-28 NOTE — ANESTHESIA POSTPROCEDURE EVALUATION
Anesthesia Post Evaluation    Patient: Essence Tapia    Procedure(s) Performed: Procedure(s) (LRB):  EGD (ESOPHAGOGASTRODUODENOSCOPY) (N/A)    Final Anesthesia Type: general      Patient location during evaluation: PACU  Patient participation: Yes- Able to Participate  Level of consciousness: awake and alert  Post-procedure vital signs: reviewed and stable  Pain management: adequate  Airway patency: patent    PONV status at discharge: No PONV  Anesthetic complications: no      Cardiovascular status: stable  Respiratory status: spontaneous ventilation  Hydration status: euvolemic  Follow-up not needed.          Vitals Value Taken Time   /66 02/28/25 12:52   Temp 36.5 °C (97.7 °F) 02/28/25 12:50   Pulse 75 02/28/25 13:00   Resp 31 02/28/25 13:00   SpO2 97 % 02/28/25 13:00   Vitals shown include unfiled device data.      No case tracking events are documented in the log.      Pain/Sushant Score: Sushant Score: 9 (2/28/2025 12:50 PM)

## 2025-03-01 ENCOUNTER — HOSPITAL ENCOUNTER (OUTPATIENT)
Dept: RADIOLOGY | Facility: HOSPITAL | Age: 70
Discharge: HOME | End: 2025-03-01
Payer: MEDICARE

## 2025-03-01 ENCOUNTER — PATIENT MESSAGE (OUTPATIENT)
Dept: INTERNAL MEDICINE | Facility: CLINIC | Age: 70
End: 2025-03-01
Payer: MEDICARE

## 2025-03-01 DIAGNOSIS — F41.9 ANXIETY: ICD-10-CM

## 2025-03-01 DIAGNOSIS — R10.9 ABDOMINAL PAIN, UNSPECIFIED ABDOMINAL LOCATION: ICD-10-CM

## 2025-03-01 PROCEDURE — 74177 CT ABD & PELVIS W/CONTRAST: CPT | Mod: TC

## 2025-03-01 PROCEDURE — 74177 CT ABD & PELVIS W/CONTRAST: CPT | Mod: 26,,, | Performed by: RADIOLOGY

## 2025-03-01 PROCEDURE — 25500020 PHARM REV CODE 255

## 2025-03-01 RX ADMIN — IOHEXOL 75 ML: 350 INJECTION, SOLUTION INTRAVENOUS at 12:03

## 2025-03-01 NOTE — TELEPHONE ENCOUNTER
CT enterography abd pelv with contrast tomorrow. States she misplaced her paperwork, but per her [employer] info, supposed to be fasting for 4 hrs prior. Questioning if this includes water.   Advised to be completely NPO for that 4 hrs to be prepared, as no specific instructions obtained via EMR. Pt VU.         Reason for Disposition   Question about upcoming scheduled surgery, procedure or test, no triage required, and triager able to answer question    Protocols used: Information Only Call - No Triage-A-

## 2025-03-03 RX ORDER — HYDROXYZINE HYDROCHLORIDE 25 MG/1
25 TABLET, FILM COATED ORAL DAILY PRN
Qty: 90 TABLET | Refills: 1 | Status: SHIPPED | OUTPATIENT
Start: 2025-03-03

## 2025-03-03 RX ORDER — LISINOPRIL AND HYDROCHLOROTHIAZIDE 10; 12.5 MG/1; MG/1
1 TABLET ORAL DAILY
Qty: 90 TABLET | Refills: 0 | Status: SHIPPED | OUTPATIENT
Start: 2025-03-03

## 2025-03-06 PROBLEM — K58.9 IRRITABLE BOWEL SYNDROME (IBS): Status: ACTIVE | Noted: 2025-03-06

## 2025-03-06 LAB
FINAL PATHOLOGIC DIAGNOSIS: NORMAL
GROSS: NORMAL
Lab: NORMAL

## 2025-03-10 ENCOUNTER — RESULTS FOLLOW-UP (OUTPATIENT)
Dept: GASTROENTEROLOGY | Facility: CLINIC | Age: 70
End: 2025-03-10

## 2025-03-10 ENCOUNTER — TELEPHONE (OUTPATIENT)
Dept: GASTROENTEROLOGY | Facility: CLINIC | Age: 70
End: 2025-03-10
Payer: MEDICARE

## 2025-03-10 NOTE — TELEPHONE ENCOUNTER
----- Message from Jann Doyle MD sent at 3/10/2025 10:29 AM CDT -----  Please notify patient, the stomach biopsies did not reveal any H.pylori.    ----- Message -----  From: Diego, [facility]  Sent: 3/6/2025   4:58 PM CDT  To: Jann Doyle MD

## 2025-03-20 ENCOUNTER — HOSPITAL ENCOUNTER (INPATIENT)
Facility: HOSPITAL | Age: 70
LOS: 1 days | Discharge: HOME | DRG: 440 | End: 2025-03-22
Attending: EMERGENCY MEDICINE | Admitting: HOSPITALIST
Payer: MEDICARE

## 2025-03-20 DIAGNOSIS — R07.9 CHEST PAIN: ICD-10-CM

## 2025-03-20 DIAGNOSIS — K85.90 PANCREATITIS: ICD-10-CM

## 2025-03-20 DIAGNOSIS — K85.00 IDIOPATHIC ACUTE PANCREATITIS WITHOUT INFECTION OR NECROSIS: Primary | ICD-10-CM

## 2025-03-20 LAB
ALBUMIN SERPL BCP-MCNC: 4.1 G/DL (ref 3.5–5.2)
ALP SERPL-CCNC: 84 U/L (ref 40–150)
ALT SERPL W/O P-5'-P-CCNC: 38 U/L (ref 10–44)
ANION GAP SERPL CALC-SCNC: 16 MMOL/L (ref 8–16)
AST SERPL-CCNC: 40 U/L (ref 10–40)
BASOPHILS # BLD AUTO: 0.04 K/UL (ref 0–0.2)
BASOPHILS NFR BLD: 0.4 % (ref 0–1.9)
BILIRUB SERPL-MCNC: 0.3 MG/DL (ref 0.1–1)
BIPAP: 0
BUN SERPL-MCNC: 9 MG/DL (ref 8–23)
CALCIUM SERPL-MCNC: 8.4 MG/DL (ref 8.7–10.5)
CHLORIDE SERPL-SCNC: 104 MMOL/L (ref 95–110)
CO2 SERPL-SCNC: 18 MMOL/L (ref 23–29)
CREAT SERPL-MCNC: 0.7 MG/DL (ref 0.5–1.4)
DIFFERENTIAL METHOD BLD: ABNORMAL
EOSINOPHIL # BLD AUTO: 0 K/UL (ref 0–0.5)
EOSINOPHIL NFR BLD: 0.1 % (ref 0–8)
ERYTHROCYTE [DISTWIDTH] IN BLOOD BY AUTOMATED COUNT: 12.1 % (ref 11.5–14.5)
EST. GFR  (NO RACE VARIABLE): >60 ML/MIN/1.73 M^2
FIO2: 21 %
GLUCOSE SERPL-MCNC: 108 MG/DL (ref 70–110)
HCT VFR BLD AUTO: 32.9 % (ref 37–48.5)
HGB BLD-MCNC: 11.8 G/DL (ref 12–16)
IMM GRANULOCYTES # BLD AUTO: 0.07 K/UL (ref 0–0.04)
IMM GRANULOCYTES NFR BLD AUTO: 0.7 % (ref 0–0.5)
LDH SERPL L TO P-CCNC: 2.2 MMOL/L
LIPASE SERPL-CCNC: 1017 U/L (ref 4–60)
LYMPHOCYTES # BLD AUTO: 0.5 K/UL (ref 1–4.8)
LYMPHOCYTES NFR BLD: 4.3 % (ref 18–48)
MCH RBC QN AUTO: 33.7 PG (ref 27–31)
MCHC RBC AUTO-ENTMCNC: 35.9 G/DL (ref 32–36)
MCV RBC AUTO: 94 FL (ref 82–98)
MONOCYTES # BLD AUTO: 0.5 K/UL (ref 0.3–1)
MONOCYTES NFR BLD: 4.5 % (ref 4–15)
NEUTROPHILS # BLD AUTO: 9.5 K/UL (ref 1.8–7.7)
NEUTROPHILS NFR BLD: 90 % (ref 38–73)
NRBC BLD-RTO: 0 /100 WBC
PLATELET # BLD AUTO: 129 K/UL (ref 150–450)
PMV BLD AUTO: 8.9 FL (ref 9.2–12.9)
POC PERFORMED BY: NORMAL
POC TEMPERATURE: 37 C
POTASSIUM SERPL-SCNC: 3.8 MMOL/L (ref 3.5–5.1)
PROT SERPL-MCNC: 7.1 G/DL (ref 6–8.4)
RBC # BLD AUTO: 3.5 M/UL (ref 4–5.4)
SODIUM SERPL-SCNC: 138 MMOL/L (ref 136–145)
SPECIMEN SOURCE: NORMAL
WBC # BLD AUTO: 10.51 K/UL (ref 3.9–12.7)

## 2025-03-20 PROCEDURE — 96375 TX/PRO/DX INJ NEW DRUG ADDON: CPT

## 2025-03-20 PROCEDURE — 82803 BLOOD GASES ANY COMBINATION: CPT

## 2025-03-20 PROCEDURE — 83690 ASSAY OF LIPASE: CPT | Performed by: EMERGENCY MEDICINE

## 2025-03-20 PROCEDURE — 99900035 HC TECH TIME PER 15 MIN (STAT)

## 2025-03-20 PROCEDURE — 83605 ASSAY OF LACTIC ACID: CPT

## 2025-03-20 PROCEDURE — 96376 TX/PRO/DX INJ SAME DRUG ADON: CPT

## 2025-03-20 PROCEDURE — 99285 EMERGENCY DEPT VISIT HI MDM: CPT | Mod: 25

## 2025-03-20 PROCEDURE — 25500020 PHARM REV CODE 255: Performed by: EMERGENCY MEDICINE

## 2025-03-20 PROCEDURE — 85025 COMPLETE CBC W/AUTO DIFF WBC: CPT | Performed by: EMERGENCY MEDICINE

## 2025-03-20 PROCEDURE — 80053 COMPREHEN METABOLIC PANEL: CPT | Performed by: EMERGENCY MEDICINE

## 2025-03-20 PROCEDURE — 63600175 PHARM REV CODE 636 W HCPCS: Performed by: EMERGENCY MEDICINE

## 2025-03-20 RX ORDER — ONDANSETRON HYDROCHLORIDE 2 MG/ML
4 INJECTION, SOLUTION INTRAVENOUS
Status: COMPLETED | OUTPATIENT
Start: 2025-03-20 | End: 2025-03-20

## 2025-03-20 RX ORDER — FENTANYL CITRATE 50 UG/ML
100 INJECTION, SOLUTION INTRAMUSCULAR; INTRAVENOUS
Refills: 0 | Status: COMPLETED | OUTPATIENT
Start: 2025-03-20 | End: 2025-03-20

## 2025-03-20 RX ORDER — FENTANYL CITRATE 50 UG/ML
50 INJECTION, SOLUTION INTRAMUSCULAR; INTRAVENOUS
Refills: 0 | Status: COMPLETED | OUTPATIENT
Start: 2025-03-21 | End: 2025-03-20

## 2025-03-20 RX ORDER — SODIUM CHLORIDE, SODIUM LACTATE, POTASSIUM CHLORIDE, CALCIUM CHLORIDE 600; 310; 30; 20 MG/100ML; MG/100ML; MG/100ML; MG/100ML
1000 INJECTION, SOLUTION INTRAVENOUS
Status: COMPLETED | OUTPATIENT
Start: 2025-03-20 | End: 2025-03-21

## 2025-03-20 RX ADMIN — ONDANSETRON 4 MG: 2 INJECTION INTRAMUSCULAR; INTRAVENOUS at 10:03

## 2025-03-20 RX ADMIN — ONDANSETRON 4 MG: 2 INJECTION INTRAMUSCULAR; INTRAVENOUS at 09:03

## 2025-03-20 RX ADMIN — SODIUM CHLORIDE, POTASSIUM CHLORIDE, SODIUM LACTATE AND CALCIUM CHLORIDE 1000 ML: 600; 310; 30; 20 INJECTION, SOLUTION INTRAVENOUS at 11:03

## 2025-03-20 RX ADMIN — FENTANYL CITRATE 50 MCG: 50 INJECTION INTRAMUSCULAR; INTRAVENOUS at 11:03

## 2025-03-20 RX ADMIN — FENTANYL CITRATE 100 MCG: 50 INJECTION INTRAMUSCULAR; INTRAVENOUS at 09:03

## 2025-03-20 RX ADMIN — IOHEXOL 75 ML: 350 INJECTION, SOLUTION INTRAVENOUS at 10:03

## 2025-03-20 NOTE — Clinical Note
Diagnosis: Idiopathic acute pancreatitis without infection or necrosis [[id number]]   Future Attending Provider: KRANTHI ZEE [[id number]]

## 2025-03-21 LAB
ALBUMIN SERPL BCP-MCNC: 3.8 G/DL (ref 3.5–5.2)
ALP SERPL-CCNC: 77 U/L (ref 40–150)
ALT SERPL W/O P-5'-P-CCNC: 34 U/L (ref 10–44)
ANION GAP SERPL CALC-SCNC: 12 MMOL/L (ref 8–16)
AST SERPL-CCNC: 36 U/L (ref 10–40)
BASOPHILS # BLD AUTO: 0.02 K/UL (ref 0–0.2)
BASOPHILS NFR BLD: 0.2 % (ref 0–1.9)
BILIRUB SERPL-MCNC: 0.6 MG/DL (ref 0.1–1)
BILIRUB UR QL STRIP: NEGATIVE
BUN SERPL-MCNC: 9 MG/DL (ref 8–23)
CALCIUM SERPL-MCNC: 8.3 MG/DL (ref 8.7–10.5)
CHLORIDE SERPL-SCNC: 103 MMOL/L (ref 95–110)
CHOLEST SERPL-MCNC: 227 MG/DL (ref 120–199)
CHOLEST/HDLC SERPL: 2.9 {RATIO} (ref 2–5)
CLARITY UR REFRACT.AUTO: CLEAR
CO2 SERPL-SCNC: 21 MMOL/L (ref 23–29)
COLOR UR AUTO: YELLOW
CREAT SERPL-MCNC: 0.8 MG/DL (ref 0.5–1.4)
DIFFERENTIAL METHOD BLD: ABNORMAL
EOSINOPHIL # BLD AUTO: 0 K/UL (ref 0–0.5)
EOSINOPHIL NFR BLD: 0 % (ref 0–8)
ERYTHROCYTE [DISTWIDTH] IN BLOOD BY AUTOMATED COUNT: 12.2 % (ref 11.5–14.5)
EST. GFR  (NO RACE VARIABLE): >60 ML/MIN/1.73 M^2
GLUCOSE SERPL-MCNC: 152 MG/DL (ref 70–110)
GLUCOSE UR QL STRIP: NEGATIVE
HCT VFR BLD AUTO: 33 % (ref 37–48.5)
HDLC SERPL-MCNC: 79 MG/DL (ref 40–75)
HDLC SERPL: 34.8 % (ref 20–50)
HGB BLD-MCNC: 11.2 G/DL (ref 12–16)
HGB UR QL STRIP: ABNORMAL
IMM GRANULOCYTES # BLD AUTO: 0.05 K/UL (ref 0–0.04)
IMM GRANULOCYTES NFR BLD AUTO: 0.6 % (ref 0–0.5)
KETONES UR QL STRIP: ABNORMAL
LACTATE SERPL-SCNC: 0.9 MMOL/L (ref 0.5–2.2)
LDLC SERPL CALC-MCNC: 134.8 MG/DL (ref 63–159)
LEUKOCYTE ESTERASE UR QL STRIP: NEGATIVE
LYMPHOCYTES # BLD AUTO: 0.3 K/UL (ref 1–4.8)
LYMPHOCYTES NFR BLD: 3.1 % (ref 18–48)
MAGNESIUM SERPL-MCNC: 1.4 MG/DL (ref 1.6–2.6)
MCH RBC QN AUTO: 31.9 PG (ref 27–31)
MCHC RBC AUTO-ENTMCNC: 33.9 G/DL (ref 32–36)
MCV RBC AUTO: 94 FL (ref 82–98)
MONOCYTES # BLD AUTO: 0.5 K/UL (ref 0.3–1)
MONOCYTES NFR BLD: 6.4 % (ref 4–15)
NEUTROPHILS # BLD AUTO: 7.3 K/UL (ref 1.8–7.7)
NEUTROPHILS NFR BLD: 89.7 % (ref 38–73)
NITRITE UR QL STRIP: NEGATIVE
NONHDLC SERPL-MCNC: 148 MG/DL
NRBC BLD-RTO: 0 /100 WBC
OHS QRS DURATION: 70 MS
OHS QTC CALCULATION: 456 MS
PH UR STRIP: 7 [PH] (ref 5–8)
PHOSPHATE SERPL-MCNC: 4 MG/DL (ref 2.7–4.5)
PLATELET # BLD AUTO: 118 K/UL (ref 150–450)
PMV BLD AUTO: 8.7 FL (ref 9.2–12.9)
POTASSIUM SERPL-SCNC: 4.1 MMOL/L (ref 3.5–5.1)
PROT SERPL-MCNC: 6.7 G/DL (ref 6–8.4)
PROT UR QL STRIP: ABNORMAL
RBC # BLD AUTO: 3.51 M/UL (ref 4–5.4)
SODIUM SERPL-SCNC: 136 MMOL/L (ref 136–145)
SP GR UR STRIP: >1.03 (ref 1–1.03)
TRIGL SERPL-MCNC: 66 MG/DL (ref 30–150)
URN SPEC COLLECT METH UR: ABNORMAL
WBC # BLD AUTO: 8.1 K/UL (ref 3.9–12.7)

## 2025-03-21 PROCEDURE — 25000003 PHARM REV CODE 250

## 2025-03-21 PROCEDURE — 96375 TX/PRO/DX INJ NEW DRUG ADDON: CPT

## 2025-03-21 PROCEDURE — 63600175 PHARM REV CODE 636 W HCPCS

## 2025-03-21 PROCEDURE — 20600001 HC STEP DOWN PRIVATE ROOM

## 2025-03-21 PROCEDURE — 84100 ASSAY OF PHOSPHORUS: CPT

## 2025-03-21 PROCEDURE — 85025 COMPLETE CBC W/AUTO DIFF WBC: CPT

## 2025-03-21 PROCEDURE — 81003 URINALYSIS AUTO W/O SCOPE: CPT | Performed by: EMERGENCY MEDICINE

## 2025-03-21 PROCEDURE — 96366 THER/PROPH/DIAG IV INF ADDON: CPT

## 2025-03-21 PROCEDURE — 80061 LIPID PANEL: CPT

## 2025-03-21 PROCEDURE — 96367 TX/PROPH/DG ADDL SEQ IV INF: CPT

## 2025-03-21 PROCEDURE — 96376 TX/PRO/DX INJ SAME DRUG ADON: CPT

## 2025-03-21 PROCEDURE — 83605 ASSAY OF LACTIC ACID: CPT | Performed by: PHYSICIAN ASSISTANT

## 2025-03-21 PROCEDURE — 96361 HYDRATE IV INFUSION ADD-ON: CPT

## 2025-03-21 PROCEDURE — 93005 ELECTROCARDIOGRAM TRACING: CPT

## 2025-03-21 PROCEDURE — 63600175 PHARM REV CODE 636 W HCPCS: Performed by: PHYSICIAN ASSISTANT

## 2025-03-21 PROCEDURE — 83735 ASSAY OF MAGNESIUM: CPT

## 2025-03-21 PROCEDURE — 96365 THER/PROPH/DIAG IV INF INIT: CPT

## 2025-03-21 PROCEDURE — 80053 COMPREHEN METABOLIC PANEL: CPT

## 2025-03-21 PROCEDURE — 93010 ELECTROCARDIOGRAM REPORT: CPT | Mod: ,,, | Performed by: INTERNAL MEDICINE

## 2025-03-21 PROCEDURE — 63600175 PHARM REV CODE 636 W HCPCS: Performed by: STUDENT IN AN ORGANIZED HEALTH CARE EDUCATION/TRAINING PROGRAM

## 2025-03-21 PROCEDURE — 25000003 PHARM REV CODE 250: Performed by: PHYSICIAN ASSISTANT

## 2025-03-21 RX ORDER — LISINOPRIL AND HYDROCHLOROTHIAZIDE 10; 12.5 MG/1; MG/1
1 TABLET ORAL DAILY
Status: DISCONTINUED | OUTPATIENT
Start: 2025-03-21 | End: 2025-03-22 | Stop reason: HOSPADM

## 2025-03-21 RX ORDER — LORAZEPAM 0.5 MG/1
2 TABLET ORAL EVERY 4 HOURS PRN
Status: DISCONTINUED | OUTPATIENT
Start: 2025-03-21 | End: 2025-03-22 | Stop reason: HOSPADM

## 2025-03-21 RX ORDER — IBUPROFEN 200 MG
24 TABLET ORAL
Status: DISCONTINUED | OUTPATIENT
Start: 2025-03-21 | End: 2025-03-22 | Stop reason: HOSPADM

## 2025-03-21 RX ORDER — ATORVASTATIN CALCIUM 20 MG/1
20 TABLET, FILM COATED ORAL DAILY
Status: DISCONTINUED | OUTPATIENT
Start: 2025-03-21 | End: 2025-03-22 | Stop reason: HOSPADM

## 2025-03-21 RX ORDER — FOLIC ACID 1 MG/1
1 TABLET ORAL DAILY
Status: DISCONTINUED | OUTPATIENT
Start: 2025-03-22 | End: 2025-03-22 | Stop reason: HOSPADM

## 2025-03-21 RX ORDER — SODIUM CHLORIDE 0.9 % (FLUSH) 0.9 %
10 SYRINGE (ML) INJECTION EVERY 12 HOURS PRN
Status: DISCONTINUED | OUTPATIENT
Start: 2025-03-21 | End: 2025-03-22 | Stop reason: HOSPADM

## 2025-03-21 RX ORDER — NALOXONE HCL 0.4 MG/ML
0.02 VIAL (ML) INJECTION
Status: DISCONTINUED | OUTPATIENT
Start: 2025-03-21 | End: 2025-03-22 | Stop reason: HOSPADM

## 2025-03-21 RX ORDER — LEVOTHYROXINE SODIUM 88 UG/1
88 TABLET ORAL
Status: DISCONTINUED | OUTPATIENT
Start: 2025-03-21 | End: 2025-03-22 | Stop reason: HOSPADM

## 2025-03-21 RX ORDER — SODIUM CHLORIDE, SODIUM LACTATE, POTASSIUM CHLORIDE, CALCIUM CHLORIDE 600; 310; 30; 20 MG/100ML; MG/100ML; MG/100ML; MG/100ML
INJECTION, SOLUTION INTRAVENOUS CONTINUOUS
Status: DISCONTINUED | OUTPATIENT
Start: 2025-03-21 | End: 2025-03-22

## 2025-03-21 RX ORDER — HYDROXYZINE PAMOATE 25 MG/1
25 CAPSULE ORAL EVERY 8 HOURS PRN
Status: DISCONTINUED | OUTPATIENT
Start: 2025-03-21 | End: 2025-03-22 | Stop reason: HOSPADM

## 2025-03-21 RX ORDER — IBUPROFEN 200 MG
16 TABLET ORAL
Status: DISCONTINUED | OUTPATIENT
Start: 2025-03-21 | End: 2025-03-22 | Stop reason: HOSPADM

## 2025-03-21 RX ORDER — PROCHLORPERAZINE EDISYLATE 5 MG/ML
2.5 INJECTION INTRAMUSCULAR; INTRAVENOUS EVERY 4 HOURS PRN
Status: DISCONTINUED | OUTPATIENT
Start: 2025-03-21 | End: 2025-03-22 | Stop reason: HOSPADM

## 2025-03-21 RX ORDER — ZOLPIDEM TARTRATE 5 MG/1
5 TABLET ORAL NIGHTLY PRN
Status: DISCONTINUED | OUTPATIENT
Start: 2025-03-21 | End: 2025-03-21

## 2025-03-21 RX ORDER — METOCLOPRAMIDE HYDROCHLORIDE 5 MG/ML
10 INJECTION INTRAMUSCULAR; INTRAVENOUS
Status: COMPLETED | OUTPATIENT
Start: 2025-03-21 | End: 2025-03-21

## 2025-03-21 RX ORDER — THIAMINE HCL 100 MG
100 TABLET ORAL DAILY
Status: DISCONTINUED | OUTPATIENT
Start: 2025-03-21 | End: 2025-03-22 | Stop reason: HOSPADM

## 2025-03-21 RX ORDER — LORAZEPAM 0.5 MG/1
2 TABLET ORAL NIGHTLY
Status: DISCONTINUED | OUTPATIENT
Start: 2025-03-21 | End: 2025-03-22 | Stop reason: HOSPADM

## 2025-03-21 RX ORDER — SENNOSIDES 8.6 MG/1
8.6 TABLET ORAL DAILY
Status: DISCONTINUED | OUTPATIENT
Start: 2025-03-21 | End: 2025-03-22

## 2025-03-21 RX ORDER — ENOXAPARIN SODIUM 100 MG/ML
40 INJECTION SUBCUTANEOUS EVERY 24 HOURS
Status: DISCONTINUED | OUTPATIENT
Start: 2025-03-21 | End: 2025-03-22 | Stop reason: HOSPADM

## 2025-03-21 RX ORDER — MAGNESIUM SULFATE HEPTAHYDRATE 40 MG/ML
2 INJECTION, SOLUTION INTRAVENOUS ONCE
Status: DISCONTINUED | OUTPATIENT
Start: 2025-03-21 | End: 2025-03-21

## 2025-03-21 RX ORDER — DIPHENHYDRAMINE HYDROCHLORIDE 50 MG/ML
12.5 INJECTION, SOLUTION INTRAMUSCULAR; INTRAVENOUS
Status: COMPLETED | OUTPATIENT
Start: 2025-03-21 | End: 2025-03-21

## 2025-03-21 RX ORDER — SODIUM CHLORIDE, SODIUM LACTATE, POTASSIUM CHLORIDE, CALCIUM CHLORIDE 600; 310; 30; 20 MG/100ML; MG/100ML; MG/100ML; MG/100ML
INJECTION, SOLUTION INTRAVENOUS CONTINUOUS
Status: DISCONTINUED | OUTPATIENT
Start: 2025-03-21 | End: 2025-03-21

## 2025-03-21 RX ORDER — MAGNESIUM SULFATE HEPTAHYDRATE 40 MG/ML
2 INJECTION, SOLUTION INTRAVENOUS
Status: COMPLETED | OUTPATIENT
Start: 2025-03-21 | End: 2025-03-21

## 2025-03-21 RX ORDER — MORPHINE SULFATE 2 MG/ML
2 INJECTION, SOLUTION INTRAMUSCULAR; INTRAVENOUS EVERY 4 HOURS PRN
Status: DISCONTINUED | OUTPATIENT
Start: 2025-03-21 | End: 2025-03-22 | Stop reason: HOSPADM

## 2025-03-21 RX ORDER — GLUCAGON 1 MG
1 KIT INJECTION
Status: DISCONTINUED | OUTPATIENT
Start: 2025-03-21 | End: 2025-03-22 | Stop reason: HOSPADM

## 2025-03-21 RX ORDER — LORAZEPAM 1 MG/1
1 TABLET ORAL NIGHTLY
Status: DISCONTINUED | OUTPATIENT
Start: 2025-03-21 | End: 2025-03-21

## 2025-03-21 RX ADMIN — SODIUM CHLORIDE, POTASSIUM CHLORIDE, SODIUM LACTATE AND CALCIUM CHLORIDE: 600; 310; 30; 20 INJECTION, SOLUTION INTRAVENOUS at 03:03

## 2025-03-21 RX ADMIN — PROCHLORPERAZINE EDISYLATE 2.5 MG: 5 INJECTION INTRAMUSCULAR; INTRAVENOUS at 05:03

## 2025-03-21 RX ADMIN — MORPHINE SULFATE 2 MG: 2 INJECTION, SOLUTION INTRAMUSCULAR; INTRAVENOUS at 01:03

## 2025-03-21 RX ADMIN — DIPHENHYDRAMINE HYDROCHLORIDE 12.5 MG: 50 INJECTION, SOLUTION INTRAMUSCULAR; INTRAVENOUS at 12:03

## 2025-03-21 RX ADMIN — METOCLOPRAMIDE 10 MG: 5 INJECTION, SOLUTION INTRAMUSCULAR; INTRAVENOUS at 12:03

## 2025-03-21 RX ADMIN — LORAZEPAM 2 MG: 0.5 TABLET ORAL at 11:03

## 2025-03-21 RX ADMIN — PROCHLORPERAZINE EDISYLATE 2.5 MG: 5 INJECTION INTRAMUSCULAR; INTRAVENOUS at 01:03

## 2025-03-21 RX ADMIN — LORAZEPAM 1 MG: 1 TABLET ORAL at 02:03

## 2025-03-21 RX ADMIN — SODIUM CHLORIDE, POTASSIUM CHLORIDE, SODIUM LACTATE AND CALCIUM CHLORIDE: 600; 310; 30; 20 INJECTION, SOLUTION INTRAVENOUS at 10:03

## 2025-03-21 RX ADMIN — LEVOTHYROXINE SODIUM 88 MCG: 88 TABLET ORAL at 05:03

## 2025-03-21 RX ADMIN — LISINOPRIL AND HYDROCHLOROTHIAZIDE TABLETS 1 TABLET: 10; 12.5 TABLET ORAL at 08:03

## 2025-03-21 RX ADMIN — SENNOSIDES 8.6 MG: 8.6 TABLET, FILM COATED ORAL at 12:03

## 2025-03-21 RX ADMIN — MORPHINE SULFATE 2 MG: 2 INJECTION, SOLUTION INTRAMUSCULAR; INTRAVENOUS at 12:03

## 2025-03-21 RX ADMIN — Medication 100 MG: at 08:03

## 2025-03-21 RX ADMIN — MAGNESIUM SULFATE HEPTAHYDRATE 2 G: 40 INJECTION, SOLUTION INTRAVENOUS at 12:03

## 2025-03-21 RX ADMIN — PROMETHAZINE HYDROCHLORIDE 12.5 MG: 25 INJECTION INTRAMUSCULAR; INTRAVENOUS at 10:03

## 2025-03-21 RX ADMIN — HYDROXYZINE PAMOATE 25 MG: 25 CAPSULE ORAL at 02:03

## 2025-03-21 RX ADMIN — ATORVASTATIN CALCIUM 20 MG: 20 TABLET, FILM COATED ORAL at 08:03

## 2025-03-21 RX ADMIN — MORPHINE SULFATE 2 MG: 2 INJECTION, SOLUTION INTRAMUSCULAR; INTRAVENOUS at 05:03

## 2025-03-21 RX ADMIN — MAGNESIUM SULFATE HEPTAHYDRATE 2 G: 40 INJECTION, SOLUTION INTRAVENOUS at 10:03

## 2025-03-21 RX ADMIN — FOLIC ACID 1 MG: 5 INJECTION, SOLUTION INTRAMUSCULAR; INTRAVENOUS; SUBCUTANEOUS at 08:03

## 2025-03-21 NOTE — SUBJECTIVE & OBJECTIVE
Past Medical History:   Diagnosis Date    Diverticulitis     Edema     Hypertension     Spinal stenosis     Thyroid disease        Past Surgical History:   Procedure Laterality Date    APPENDECTOMY      BREAST SURGERY  2023     SECTION, CLASSIC      COLON SURGERY  2003    COSMETIC SURGERY      ESOPHAGOGASTRODUODENOSCOPY N/A 2025    Procedure: EGD (ESOPHAGOGASTRODUODENOSCOPY);  Surgeon: Jann Doyle MD;  Location: [facility];  Service: Endoscopy;  Laterality: N/A;  Ref by MD FABI Clark, copy in hand - PC  -Precall complete-BM  2.27 precall attempted; lvm with callback number; AP    LIPOSUCTION  2023    NASAL RECONSTRUCTION      SIGMOIDECTOMY      TONSILLECTOMY      TOTAL REDUCTION MAMMOPLASTY Bilateral     and lift       Review of patient's allergies indicates:   Allergen Reactions    Codeine     Hydrocodone Hives, Nausea And Vomiting and Other (See Comments)     hallucinations    Hydromorphone Other (See Comments)     hallucinations    Latex Itching    Tramadol        No current facility-administered medications on file prior to encounter.     Current Outpatient Medications on File Prior to Encounter   Medication Sig    azelastine (ASTELIN) 137 mcg (0.1 %) nasal spray 2 sprays (274 mcg total) by Nasal route 2 (two) times daily as needed. Donot snort (because it tastes bad)    bismuth subsalicylate (BISMUTH) 262 mg Chew Take by mouth 2 (two) times daily as needed (stomach upset).    brimonidine tartrate (LUMIFY OPHT) Place 1 drop into both eyes daily as needed (redness).    hydrOXYzine HCL (ATARAX) 25 MG tablet Take 1 tablet (25 mg total) by mouth daily as needed for Anxiety.    ibuprofen (ADVIL) 200 MG tablet Take 400 mg by mouth daily as needed (headache).    ipratropium (ATROVENT) 42 mcg (0.06 %) nasal spray 2 sprays by Each Nostril route 2 (two) times daily as needed.    levothyroxine (SYNTHROID) 88 MCG tablet TAKE 1 TABLET BY MOUTH EVERY DAY     lisinopriL-hydrochlorothiazide (PRINZIDE,ZESTORETIC) 10-12.5 mg per tablet Take 1 tablet by mouth once daily.    loratadine (CLARITIN) 10 mg tablet Take 10 mg by mouth daily as needed for Allergies.    potassium chloride (KLOR-CON) 10 MEQ TbSR Take 1 tablet (10 mEq total) by mouth once daily.    promethazine (PHENERGAN) 25 MG tablet TAKE 1 TABLET BY MOUTH EVERY 6 HOURS AS NEEDED FOR NAUSEA    psyllium (METAMUCIL SUGAR FREE) Powd Take by mouth. Place 1 tbsp into beverage and drink daily    rosuvastatin (CRESTOR) 5 MG tablet Take 1 tablet (5 mg total) by mouth once daily.    temazepam (RESTORIL) 15 mg Cap Take 2 capsules (30 mg total) by mouth nightly as needed (sleep disturbance).    UNABLE TO FIND Take 1 tablet by mouth Daily. medication name: BIO COMPLETE 3     Family History       Problem Relation (Age of Onset)    Arthritis Maternal Grandmother    Breast cancer Sister (48)    COPD Father    Cancer Sister    No Known Problems Mother          Tobacco Use    Smoking status: Never     Passive exposure: Never    Smokeless tobacco: Never   Substance and Sexual Activity    Alcohol use: Yes     Alcohol/week: 10.0 standard drinks of alcohol     Types: 10 Glasses of wine per week    Drug use: No    Sexual activity: Yes     Partners: Male     Birth control/protection: None     Comment: I am 69 years old     Review of Systems   Constitutional:  Positive for appetite change. Negative for activity change, chills and fever.   HENT:  Negative for congestion and rhinorrhea.    Respiratory:  Negative for cough, chest tightness and shortness of breath.    Cardiovascular:  Negative for chest pain, palpitations and leg swelling.   Gastrointestinal:  Positive for abdominal pain, nausea and vomiting. Negative for constipation and diarrhea.   Genitourinary:  Negative for dysuria, frequency and urgency.   Musculoskeletal:  Negative for back pain.   Neurological:  Negative for light-headedness and headaches.     Objective:     Vital Signs  (Most Recent):  Temp: 98.4 °F (36.9 °C) (03/20/25 2044)  Pulse: 105 (03/21/25 0132)  Resp: 20 (03/21/25 0137)  BP: (!) 140/80 (03/21/25 0132)  SpO2: 98 % (03/21/25 0132) Vital Signs (24h Range):  Temp:  [98.4 °F (36.9 °C)] 98.4 °F (36.9 °C)  Pulse:  [] 105  Resp:  [16-20] 20  SpO2:  [87 %-98 %] 98 %  BP: (117-147)/(67-91) 140/80     Weight: 63.5 kg (140 lb)  Body mass index is 25.61 kg/m².     Physical Exam  Vitals and nursing note reviewed.   Constitutional:       General: She is not in acute distress.     Appearance: Normal appearance.   HENT:      Head: Normocephalic and atraumatic.      Right Ear: External ear normal.      Left Ear: External ear normal.      Nose: Nose normal.      Mouth/Throat:      Mouth: Mucous membranes are moist.   Eyes:      General: No scleral icterus.     Extraocular Movements: Extraocular movements intact.      Pupils: Pupils are equal, round, and reactive to light.   Cardiovascular:      Rate and Rhythm: Normal rate and regular rhythm.      Pulses: Normal pulses.      Heart sounds: Normal heart sounds. No murmur heard.  Pulmonary:      Effort: Pulmonary effort is normal. No respiratory distress.      Breath sounds: Normal breath sounds. No stridor. No wheezing, rhonchi or rales.   Chest:      Chest wall: No tenderness.   Abdominal:      General: There is no distension.      Palpations: Abdomen is soft. There is no mass.      Tenderness: There is abdominal tenderness. There is no guarding or rebound.      Hernia: No hernia is present.   Musculoskeletal:      Cervical back: Normal range of motion.      Right lower leg: No edema.      Left lower leg: No edema.   Skin:     General: Skin is warm.   Neurological:      General: No focal deficit present.      Mental Status: She is alert and oriented to person, place, and time.      Motor: No weakness.      Comments: + hand tremor at rest and with fine motor tasks   Psychiatric:         Mood and Affect: Mood normal.         Behavior:  Behavior normal.         Thought Content: Thought content normal.         Judgment: Judgment normal.              CRANIAL NERVES     CN III, IV, VI   Pupils are equal, round, and reactive to light.       Significant Labs: All pertinent labs within the past 24 hours have been reviewed.    Significant Imaging: I have reviewed all pertinent imaging results/findings within the past 24 hours.

## 2025-03-21 NOTE — HPI
"Essence Tapia is a 69 y.o. F with a PMHx of HTN, HLD, anxiety, chronic rhinitis, hypothyroidism, insomnia, complicated diverticulitis s/p sigmoid colon resection in 2018, pancreatitis 2/2 ozempic use in 10/2023 presenting with sudden onset severe abdominal pain and nausea. States she was babysitting her grandchild yesterday (3/20) when she had sudden onset of diffuse abdominal pain. Symptoms progressed to nausea and she had an episode of non-bloody, non-bilious emesis in the car on the way home. Throughout the evening, her symptoms progressively worsened which prompted her to call EMS. Patient states the abdominal pain is diffuse and does not radiate anywhere. Denies any lightheadedness, chest pain, dyspnea, diarrhea, constipation, or dysuria. She recently was evaluated by GI for nausea, abdominal pain, and bloating and had an EGD on 2/28 with findings c/w chronic gastritis, biopsy negative for H. Pylori. Patient has not been on Ozempic since previous bout of pancreatitis. She drinks "a couple" glasses of red wine nightly, and occasionally more at a party or with friends. Denies any other illicit drug use.     In the ED, she was HDS on room air. Labs were notable for Lipase 1017, WBC 10.5, Bicarb 18. UA still pending. CT A/P revealing edematous pancreas c/w acute pancreatitis. She was made NPO and received IVF, IV Fentanyl, and Zofran. Admitted to hospital medicine for acute pancreatitis.   "

## 2025-03-21 NOTE — PLAN OF CARE
Inpatient Upgrade Note    Essence Tapia has warranted treatment spanning two or more midnights of hospital level care for the management of  acute pancreatitis . She continues to require IV fluids, daily labs, monitoring of vital signs, advancing diet, and IV pain medication. Her condition is also complicated by the following comorbidities: Hypertension.

## 2025-03-21 NOTE — PLAN OF CARE
[facility] - Emergency Dept  Initial Discharge Assessment       Primary Care Provider: Easton Sadler MD    Admission Diagnosis: Idiopathic acute pancreatitis without infection or necrosis [K85.00]    Admission Date: 3/20/2025    Pt is independent with their ADL's and ambulation, does not require assistance. Post acute was discussed with pt. Pt d/c home with no needs at the moment. Pt family/friend to provide transportation home.   Expected Discharge Date:     Transition of Care Barriers: (P) None    Payor: [employer] [facility] [employer] / Plan: [employer] CHOICES / Product Type: Medicare Advantage /     Extended Emergency Contact Information  Primary Emergency Contact: Farooq Tapia  Address: [address] [place] [address] [place] [address] [facility]  Home Phone: [phone]  Mobile Phone: [phone]  Relation: Son    Discharge Plan A: (P) Home  Discharge Plan B: (P) Home      [id number] [place] - [address]  [address]  [place] [address]  Phone: [phone] Fax: [phone]    [facility]  [address] [facility]  [place] [address]  Phone: [phone] Fax: [phone]      Initial Assessment (most recent)       Adult Discharge Assessment - 03/21/25 1100          Discharge Assessment    Assessment Type Discharge Planning Assessment (P)      Confirmed/corrected address, phone number and insurance Yes (P)      Confirmed Demographics Correct on Facesheet (P)      Source of Information patient (P)      Does patient/caregiver understand observation status Yes (P)      Reason For Admission Irritable bowel syndrome (IBS) (P)      People in Home alone (P)      Do you expect to return to your current living situation? Yes (P)      Do you have help at home or someone to help you manage your care at home? No (P)      Prior to hospitilization cognitive status: Alert/Oriented (P)      Current cognitive status: Alert/Oriented (P)      Walking or Climbing Stairs  Difficulty no (P)      Dressing/Bathing Difficulty no (P)      Home Accessibility stairs to enter home (P)      Number of Stairs, Main Entrance one (P)      Home Layout Able to live on 1st floor (P)      Equipment Currently Used at Home none (P)      Readmission within 30 days? No (P)      Patient currently being followed by outpatient case management? No (P)      Do you currently have service(s) that help you manage your care at home? No (P)      Do you take prescription medications? Yes (P)      Do you have prescription coverage? Yes (P)      Coverage Payor: [employer] [facility] - [employer] - (P)      Do you have any problems affording any of your prescribed medications? No (P)      Is the patient taking medications as prescribed? yes (P)      Who is going to help you get home at discharge? family/friend (P)      How do you get to doctors appointments? car, drives self (P)      Are you on dialysis? No (P)      Do you take coumadin? No (P)      Discharge Plan A Home (P)      Discharge Plan B Home (P)      DME Needed Upon Discharge  none (P)      Discharge Plan discussed with: Patient (P)      Transition of Care Barriers None (P)         Physical Activity    On average, how many days per week do you engage in moderate to strenuous exercise (like a brisk walk)? 0 days (P)      On average, how many minutes do you engage in exercise at this level? 0 min (P)         Financial Resource Strain    How hard is it for you to pay for the very basics like food, housing, medical care, and heating? Not very hard (P)         Housing Stability    In the last 12 months, was there a time when you were not able to pay the mortgage or rent on time? No (P)      At any time in the past 12 months, were you homeless or living in a shelter (including now)? No (P)         Transportation Needs    Has the lack of transportation kept you from medical appointments, meetings, work or from getting things needed for daily  living? No (P)         Food Insecurity    Within the past 12 months, you worried that your food would run out before you got the money to buy more. Never true (P)      Within the past 12 months, the food you bought just didn't last and you didn't have money to get more. Never true (P)         Stress    Do you feel stress - tense, restless, nervous, or anxious, or unable to sleep at night because your mind is troubled all the time - these days? Only a little (P)         Social Isolation    How often do you feel lonely or isolated from those around you?  Never (P)         Alcohol Use    Q1: How often do you have a drink containing alcohol? Never (P)      Q2: How many drinks containing alcohol do you have on a typical day when you are drinking? Patient does not drink (P)      Q3: How often do you have six or more drinks on one occasion? Never (P)         Utilities    In the past 12 months has the electric, gas, oil, or water company threatened to shut off services in your home? No (P)         Health Literacy    How often do you need to have someone help you when you read instructions, pamphlets, or other written material from your doctor or pharmacy? Never (P)                  THELMA Gibson, RAMON.   Case Management  [facility]  Email: [email or web address]

## 2025-03-21 NOTE — PHARMACY MED REC
"          Admission Medication History     The home medication history was taken by Lilibeth Nicholson.    You may go to "Admission" then "Reconcile Home Medications" tabs to review and/or act upon these items.     The home medication list has been updated by the Pharmacy department.   Please read ALL comments highlighted in yellow.   Please address this information as you see fit.    Feel free to contact us if you have any questions or require assistance.      The medications listed below were removed from the home medication list. Please reorder if appropriate:  Patient reports no longer taking the following medication(s):  Azelastine 137 mcg nasal spray  Bismuth 262 mg  Ipratropium 42 mcg nasal spray  Promethazine 25 mg      Medications listed below were obtained from: Patient/family and Analytic software- [employer]  Current Outpatient Medications on File Prior to Encounter   Medication Sig    brimonidine tartrate (LUMIFY OPHT) Place 1 drop into both eyes daily as needed (redness).    hydrOXYzine HCL (ATARAX) 25 MG tablet Take 25 mg by mouth nightly as needed for Anxiety.    ibuprofen (ADVIL) 200 MG tablet Take 400 mg by mouth daily as needed (headache).    ibuprofen/diphenhydramine cit (ADVIL PM ORAL) Take 1 tablet by mouth nightly as needed (sleep).    levothyroxine (SYNTHROID) 88 MCG tablet Take 88 mcg by mouth every morning.    lisinopriL-hydrochlorothiazide (PRINZIDE,ZESTORETIC) 10-12.5 mg per tablet Take 1 tablet by mouth every morning.    loratadine (CLARITIN) 10 mg tablet Take 10 mg by mouth daily as needed for Allergies.    potassium chloride (KLOR-CON) 10 MEQ TbSR Take 1 tablet (10 mEq total) by mouth once daily.    psyllium (METAMUCIL SUGAR FREE) Powd Place 1 tbsp into beverage and drink daily    rosuvastatin (CRESTOR) 5 MG tablet Take 1 tablet (5 mg total) by mouth nightly.    temazepam (RESTORIL) 15 mg Cap Take 2 capsules (30 mg total) by mouth nightly as needed (sleep disturbance).    UNABLE TO FIND Take " 1 tablet by mouth Daily. medication name: BIO COMPLETE 3           Lilibeth Nicholson  EXT [id number]        .

## 2025-03-21 NOTE — H&P
"  [facility] - Emergency Dept  [facility] Medicine  History & Physical    Patient Name: Essence Tapia  MRN: [id number]  Patient Class: OP- Observation  Admission Date: 3/20/2025  Attending Physician: Moiz Mcknight MD   Primary Care Provider: Easton Sadler MD         Patient information was obtained from patient, past medical records, and ER records.     Subjective:     Principal Problem:Acute pancreatitis    Chief Complaint:   Chief Complaint   Patient presents with    Abdominal Pain     Epigastric pain from home, +nausea began 3 hours        HPI: Essence Tapia is a 69 y.o. F with a PMHx of HTN, HLD, anxiety, chronic rhinitis, hypothyroidism, insomnia, complicated diverticulitis s/p sigmoid colon resection in 2018, pancreatitis 2/2 ozempic use in 10/2023 presenting with sudden onset severe abdominal pain and nausea. States she was babysitting her grandchild yesterday (3/20) when she had sudden onset of diffuse abdominal pain. Symptoms progressed to nausea and she had an episode of non-bloody, non-bilious emesis in the car on the way home. Throughout the evening, her symptoms progressively worsened which prompted her to call EMS. Patient states the abdominal pain is diffuse and does not radiate anywhere. Denies any lightheadedness, chest pain, dyspnea, diarrhea, constipation, or dysuria. She recently was evaluated by GI for nausea, abdominal pain, and bloating and had an EGD on 2/28 with findings c/w chronic gastritis, biopsy negative for H. Pylori. Patient has not been on Ozempic since previous bout of pancreatitis. She drinks "a couple" glasses of red wine nightly, and occasionally more at a party or with friends. Denies any other illicit drug use.     In the ED, she was HDS on room air. Labs were notable for Lipase 1017, WBC 10.5, Bicarb 18. UA still pending. CT A/P revealing edematous pancreas c/w acute pancreatitis. She was made NPO and received IVF, IV Fentanyl, and Zofran. Admitted to hospital " medicine for acute pancreatitis.     Past Medical History:   Diagnosis Date    Diverticulitis     Edema     Hypertension     Spinal stenosis     Thyroid disease        Past Surgical History:   Procedure Laterality Date    APPENDECTOMY      BREAST SURGERY  2023     SECTION, CLASSIC      COLON SURGERY      COSMETIC SURGERY      ESOPHAGOGASTRODUODENOSCOPY N/A 2025    Procedure: EGD (ESOPHAGOGASTRODUODENOSCOPY);  Surgeon: Jann Doyle MD;  Location: [facility] ENDOSCOPY;  Service: Endoscopy;  Laterality: N/A;  Ref by MD FABI Clark, copy in hand - PC  -Precall complete-BM  2. precall attempted; lvm with callback number; AP    LIPOSUCTION  2023    NASAL RECONSTRUCTION      SIGMOIDECTOMY      TONSILLECTOMY      TOTAL REDUCTION MAMMOPLASTY Bilateral     and lift       Review of patient's allergies indicates:   Allergen Reactions    Codeine     Hydrocodone Hives, Nausea And Vomiting and Other (See Comments)     hallucinations    Hydromorphone Other (See Comments)     hallucinations    Latex Itching    Tramadol        No current facility-administered medications on file prior to encounter.     Current Outpatient Medications on File Prior to Encounter   Medication Sig    azelastine (ASTELIN) 137 mcg (0.1 %) nasal spray 2 sprays (274 mcg total) by Nasal route 2 (two) times daily as needed. Donot snort (because it tastes bad)    bismuth subsalicylate (BISMUTH) 262 mg Chew Take by mouth 2 (two) times daily as needed (stomach upset).    brimonidine tartrate (LUMIFY OPHT) Place 1 drop into both eyes daily as needed (redness).    hydrOXYzine HCL (ATARAX) 25 MG tablet Take 1 tablet (25 mg total) by mouth daily as needed for Anxiety.    ibuprofen (ADVIL) 200 MG tablet Take 400 mg by mouth daily as needed (headache).    ipratropium (ATROVENT) 42 mcg (0.06 %) nasal spray 2 sprays by Each Nostril route 2 (two) times daily as needed.    levothyroxine (SYNTHROID) 88 MCG tablet TAKE 1 TABLET BY  MOUTH EVERY DAY    lisinopriL-hydrochlorothiazide (PRINZIDE,ZESTORETIC) 10-12.5 mg per tablet Take 1 tablet by mouth once daily.    loratadine (CLARITIN) 10 mg tablet Take 10 mg by mouth daily as needed for Allergies.    potassium chloride (KLOR-CON) 10 MEQ TbSR Take 1 tablet (10 mEq total) by mouth once daily.    promethazine (PHENERGAN) 25 MG tablet TAKE 1 TABLET BY MOUTH EVERY 6 HOURS AS NEEDED FOR NAUSEA    psyllium (METAMUCIL SUGAR FREE) Powd Take by mouth. Place 1 tbsp into beverage and drink daily    rosuvastatin (CRESTOR) 5 MG tablet Take 1 tablet (5 mg total) by mouth once daily.    temazepam (RESTORIL) 15 mg Cap Take 2 capsules (30 mg total) by mouth nightly as needed (sleep disturbance).    UNABLE TO FIND Take 1 tablet by mouth Daily. medication name: BIO COMPLETE 3     Family History       Problem Relation (Age of Onset)    Arthritis Maternal Grandmother    Breast cancer Sister (48)    COPD Father    Cancer Sister    No Known Problems Mother          Tobacco Use    Smoking status: Never     Passive exposure: Never    Smokeless tobacco: Never   Substance and Sexual Activity    Alcohol use: Yes     Alcohol/week: 10.0 standard drinks of alcohol     Types: 10 Glasses of wine per week    Drug use: No    Sexual activity: Yes     Partners: Male     Birth control/protection: None     Comment: I am 69 years old     Review of Systems   Constitutional:  Positive for appetite change. Negative for activity change, chills and fever.   HENT:  Negative for congestion and rhinorrhea.    Respiratory:  Negative for cough, chest tightness and shortness of breath.    Cardiovascular:  Negative for chest pain, palpitations and leg swelling.   Gastrointestinal:  Positive for abdominal pain, nausea and vomiting. Negative for constipation and diarrhea.   Genitourinary:  Negative for dysuria, frequency and urgency.   Musculoskeletal:  Negative for back pain.   Neurological:  Negative for light-headedness and headaches.      Objective:     Vital Signs (Most Recent):  Temp: 98.4 °F (36.9 °C) (03/20/25 2044)  Pulse: 105 (03/21/25 0132)  Resp: 20 (03/21/25 0137)  BP: (!) 140/80 (03/21/25 0132)  SpO2: 98 % (03/21/25 0132) Vital Signs (24h Range):  Temp:  [98.4 °F (36.9 °C)] 98.4 °F (36.9 °C)  Pulse:  [] 105  Resp:  [16-20] 20  SpO2:  [87 %-98 %] 98 %  BP: (117-147)/(67-91) 140/80     Weight: 63.5 kg (140 lb)  Body mass index is 25.61 kg/m².     Physical Exam  Vitals and nursing note reviewed.   Constitutional:       General: She is not in acute distress.     Appearance: Normal appearance.   HENT:      Head: Normocephalic and atraumatic.      Right Ear: External ear normal.      Left Ear: External ear normal.      Nose: Nose normal.      Mouth/Throat:      Mouth: Mucous membranes are moist.   Eyes:      General: No scleral icterus.     Extraocular Movements: Extraocular movements intact.      Pupils: Pupils are equal, round, and reactive to light.   Cardiovascular:      Rate and Rhythm: Normal rate and regular rhythm.      Pulses: Normal pulses.      Heart sounds: Normal heart sounds. No murmur heard.  Pulmonary:      Effort: Pulmonary effort is normal. No respiratory distress.      Breath sounds: Normal breath sounds. No stridor. No wheezing, rhonchi or rales.   Chest:      Chest wall: No tenderness.   Abdominal:      General: There is no distension.      Palpations: Abdomen is soft. There is no mass.      Tenderness: There is abdominal tenderness. There is no guarding or rebound.      Hernia: No hernia is present.   Musculoskeletal:      Cervical back: Normal range of motion.      Right lower leg: No edema.      Left lower leg: No edema.   Skin:     General: Skin is warm.   Neurological:      General: No focal deficit present.      Mental Status: She is alert and oriented to person, place, and time.      Motor: No weakness.      Comments: + hand tremor at rest and with fine motor tasks   Psychiatric:         Mood and Affect: Mood  "normal.         Behavior: Behavior normal.         Thought Content: Thought content normal.         Judgment: Judgment normal.              CRANIAL NERVES     CN III, IV, VI   Pupils are equal, round, and reactive to light.       Significant Labs: All pertinent labs within the past 24 hours have been reviewed.    Significant Imaging: I have reviewed all pertinent imaging results/findings within the past 24 hours.  Assessment/Plan:     * Acute pancreatitis  Hx of acute pancreatitis in 2023, which was attributed to Ozempic. Has been off GLP-1 since that time. 3/20/25 presented to ED w/ acute onset abdominal pain, N/V. Lipase 1017 and CT A/P showing edematous pancreas c/w acute pancreatitis, no biliary duct dilation or gallstones. Differential includes hypertriglyceridemia, etoh induced. Gallstone less likely w/ negative CT findings.     - IVF maintenance  - NPO  - Pain control: IV morphine  - Antiemetics: IV Compazine   - No relief w/ zofran  - F/u lipid panel  - Encourage limiting EtOH consumption      Anxiety  Hx of anxiety. Home regimen includes vistaril PRN for anxiety in addition to Temazepam nightly for insomnia.     - See "insomnia"    Alcohol abuse  Pt reported consuming 2-3 glasses of wine daily. Associated with increased anxiety. Pt denied having withdrawal symptoms. Tremulous on exam. May be related to presentation w/ acute pancreatitis.    - CIWA score each shift  - Thiamine and folic acid     Hypothyroidism  10/07/24: TSH 0.132 (L), T4 0.96    - Continue Synthroid 88 mcg daily    Hyposomnia, insomnia or sleeplessness associated with anxiety  Hx of insomnia. Takes Temazepam 15-30 mg nightly, typically 30 mg most nights and Hydroxyzine 25 mg nightly. Reports still unable to sleep well throughout the night.     - Temazepam non-formulary, will start on Lorazepam while inpatient. Initial dose 1 mg nightly, may need to increase.    - 30 mg Temazepam = 3 mg Lorazepam  - Vistaril 25 mg PRN   - Discussed risks " associated with chronic benzodiazepine usage  - Will plan to discuss some non-benzo alternatives      Hypertension  Patient's blood pressure range in the last 24 hours was: BP  Min: 117/67  Max: 147/91.The patient's inpatient anti-hypertensive regimen is listed below:  Current Antihypertensives  lisinopriL-hydrochlorothiazide 10-12.5 mg per tablet 1 tablet, Daily, Oral    Plan  - resume home Lisinopril-HCTZ      VTE Risk Mitigation (From admission, onward)           Ordered     enoxaparin injection 40 mg  Daily         03/21/25 0113     IP VTE HIGH RISK PATIENT  Once         03/21/25 0113     Place sequential compression device  Until discontinued         03/21/25 0113                                  Mónica Smith MD  Department of Hospital Medicine  [facility] - Emergency Dept

## 2025-03-21 NOTE — ED PROVIDER NOTES
Encounter Date: 3/20/2025       History     Chief Complaint   Patient presents with    Abdominal Pain     Epigastric pain from home, +nausea began 3 hours     HPI  69-year-old female with a past history of sigmoid mass, diverticulitis, sigmoid colectomy.  Recent EGD with biopsy negative for H pylori on 2025.  The patient reports that she was carrying for her granddaughter when she experienced sudden onset severe periumbilical and bilateral lower quadrant abdominal pain.  It was associated with significant nausea.  She reports throwing up in her car on the way in the emergency department.  She has not had melena, hematochezia, hematemesis or coffee-ground emesis.  She has not had fever or chills.  Review of patient's allergies indicates:   Allergen Reactions    Codeine     Hydrocodone Hives, Nausea And Vomiting and Other (See Comments)     hallucinations    Hydromorphone Other (See Comments)     hallucinations    Latex Itching    Tramadol      Past Medical History:   Diagnosis Date    Diverticulitis     Edema     Hypertension     Spinal stenosis     Thyroid disease      Past Surgical History:   Procedure Laterality Date    APPENDECTOMY      BREAST SURGERY  2023     SECTION, CLASSIC      COLON SURGERY      COSMETIC SURGERY      ESOPHAGOGASTRODUODENOSCOPY N/A 2025    Procedure: EGD (ESOPHAGOGASTRODUODENOSCOPY);  Surgeon: Jann Doyle MD;  Location: [facility] ENDOSCOPY;  Service: Endoscopy;  Laterality: N/A;  Ref by MD FABI Clark, copy in hand - PC  -Precall complete-BM  2.27 precall attempted; lvm with callback number; AP    LIPOSUCTION  2023    NASAL RECONSTRUCTION      SIGMOIDECTOMY      TONSILLECTOMY      TOTAL REDUCTION MAMMOPLASTY Bilateral     and lift     Family History   Problem Relation Name Age of Onset    No Known Problems Mother      COPD Father Gerry Hamm     Breast cancer Sister  48    Arthritis Maternal Grandmother Vaishali Wilson     Cancer Sister  Angie Handy-Breast cancer      Social History[1]  Review of Systems    Physical Exam     Initial Vitals [03/20/25 2044]   BP Pulse Resp Temp SpO2   128/72 90 16 98.4 °F (36.9 °C) 98 %      MAP       --         Physical Exam    Nursing note and vitals reviewed.  Constitutional: She appears distressed (screaming in pain).   HENT:   Head: Normocephalic and atraumatic.   Eyes: Conjunctivae and EOM are normal. Pupils are equal, round, and reactive to light.   Neck: Neck supple.   Normal range of motion.  Cardiovascular:  Normal rate and regular rhythm.           Pulmonary/Chest: Breath sounds normal. No respiratory distress.   Abdominal: Abdomen is soft. She exhibits no distension and no mass. There is abdominal tenderness (Mild diffuse tenderness to palpation). There is no rebound and no guarding.   Musculoskeletal:      Cervical back: Normal range of motion and neck supple.     Neurological: She is alert. She has normal strength. No cranial nerve deficit or sensory deficit. GCS score is 15. GCS eye subscore is 4. GCS verbal subscore is 5. GCS motor subscore is 6.   Skin: Skin is warm and dry. Capillary refill takes less than 2 seconds.   Psychiatric:   Tearful, very distressed d/t pain         ED Course   Procedures  Labs Reviewed   CBC W/ AUTO DIFFERENTIAL - Abnormal       Result Value    WBC 10.51      RBC 3.50 (*)     Hemoglobin 11.8 (*)     Hematocrit 32.9 (*)     MCV 94      MCH 33.7 (*)     MCHC 35.9      RDW 12.1      Platelets 129 (*)     MPV 8.9 (*)     Immature Granulocytes 0.7 (*)     Gran # (ANC) 9.5 (*)     Immature Grans (Abs) 0.07 (*)     Lymph # 0.5 (*)     Mono # 0.5      Eos # 0.0      Baso # 0.04      nRBC 0      Gran % 90.0 (*)     Lymph % 4.3 (*)     Mono % 4.5      Eosinophil % 0.1      Basophil % 0.4      Differential Method Automated     COMPREHENSIVE METABOLIC PANEL - Abnormal    Sodium 138      Potassium 3.8      Chloride 104      CO2 18 (*)     Glucose 108      BUN 9      Creatinine  0.7      Calcium 8.4 (*)     Total Protein 7.1      Albumin 4.1      Total Bilirubin 0.3      Alkaline Phosphatase 84      AST 40      ALT 38      eGFR >60.0      Anion Gap 16     LIPASE - Abnormal    Lipase 1017 (*)    URINALYSIS, REFLEX TO URINE CULTURE - Abnormal    Specimen UA Urine, Clean Catch      Color, UA Yellow      Appearance, UA Clear      pH, UA 7.0      Specific Gravity, UA >1.030 (*)     Protein, UA Trace (*)     Glucose, UA Negative      Ketones, UA 1+ (*)     Bilirubin (UA) Negative      Occult Blood UA Trace (*)     Nitrite, UA Negative      Leukocytes, UA Negative      Narrative:     Specimen Source->Urine   LIPID PANEL - Abnormal    Cholesterol 227 (*)     Triglycerides 66      HDL 79 (*)     LDL Cholesterol 134.8      HDL/Cholesterol Ratio 34.8      Total Cholesterol/HDL Ratio 2.9      Non-HDL Cholesterol 148     CBC W/ AUTO DIFFERENTIAL - Abnormal    WBC 8.10      RBC 3.51 (*)     Hemoglobin 11.2 (*)     Hematocrit 33.0 (*)     MCV 94      MCH 31.9 (*)     MCHC 33.9      RDW 12.2      Platelets 118 (*)     MPV 8.7 (*)     Immature Granulocytes 0.6 (*)     Gran # (ANC) 7.3      Immature Grans (Abs) 0.05 (*)     Lymph # 0.3 (*)     Mono # 0.5      Eos # 0.0      Baso # 0.02      nRBC 0      Gran % 89.7 (*)     Lymph % 3.1 (*)     Mono % 6.4      Eosinophil % 0.0      Basophil % 0.2      Differential Method Automated     COMPREHENSIVE METABOLIC PANEL - Abnormal    Sodium 136      Potassium 4.1      Chloride 103      CO2 21 (*)     Glucose 152 (*)     BUN 9      Creatinine 0.8      Calcium 8.3 (*)     Total Protein 6.7      Albumin 3.8      Total Bilirubin 0.6      Alkaline Phosphatase 77      AST 36      ALT 34      eGFR >60.0      Anion Gap 12     MAGNESIUM - Abnormal    Magnesium 1.4 (*)    PHOSPHORUS    Phosphorus 4.0     LACTIC ACID, PLASMA    Lactate (Lactic Acid) 0.9     ISTAT CHEM8        ECG Results              EKG 12-lead (Final result)        Collection Time Result Time QRS Duration OHS  QTC Calculation    03/21/25 02:02:50 03/21/25 15:39:09 70 456                     Final result by Interface, Lab In [facility] (03/21/25 15:39:17)                   Narrative:    Test Reason : K85.90,    Vent. Rate : 107 BPM     Atrial Rate : 107 BPM     P-R Int : 196 ms          QRS Dur :  70 ms      QT Int : 342 ms       P-R-T Axes :  65  21  52 degrees    QTcB Int : 456 ms    Sinus tachycardia  Low voltage QRS  Low septal forces  Abnormal ECG  When compared with ECG of 17-Oct-2024 13:57,  No significant change was found  Confirmed by Yehuda Nicole ([id number]) on 3/21/2025 3:39:04 PM    Referred By: AAAREFERRAL SELF           Confirmed By: Yehuda Nicole                                  Imaging Results               CT Abdomen Pelvis With IV Contrast NO Oral Contrast (Final result)  Result time 03/20/25 23:39:34      Final result by Yaya Sims MD (03/20/25 23:39:34)                   Impression:      1. Acute pancreatitis.  Recommend clinical correlation and follow-up.  2. Diverticulosis of the colon.  3. Multilevel chronic degenerative changes of the lumbar spine.  4. Hepatic steatosis.  5. This report was flagged in Epic as abnormal.      Electronically signed by: Yaya Sims  Date:    03/20/2025  Time:    23:39               Narrative:    EXAMINATION:  CT ABDOMEN PELVIS WITH IV CONTRAST    CLINICAL HISTORY:  Epigastric pain;Abdominal pain, acute, nonlocalized;    TECHNIQUE:  Low dose axial images, sagittal and coronal reformations were obtained from the lung bases to the pubic symphysis following the IV administration of 75 mL of Omnipaque 350 .  Oral contrast was not administered.    COMPARISON:  03/01/2025    FINDINGS:  Abdomen:    - Lower thorax:    - Lung bases: Mild bibasilar atelectasis.    - Liver: No focal mass.  Mild diffuse fatty infiltration of the liver.    - Gallbladder: No calcified gallstones.    - Bile Ducts: No evidence of intra or extra hepatic biliary ductal dilation.    - Spleen:  Negative.    - Kidneys: No mass or hydronephrosis.    - Adrenals: Unremarkable.    - Pancreas: Pancreas is mildly edematous with minimal adjacent fluid consistent with acute pancreatitis.  Follow-up recommended.  No focal mass.    - Retroperitoneum:  No significant adenopathy.    - Vascular: No abdominal aortic aneurysm.    - Abdominal wall:  Unremarkable.    Pelvis:    No pelvic mass, adenopathy, or free fluid.    Bowel/Mesentery:    No evidence of bowel obstruction or inflammation.  Diverticulosis of the colon.  No evidence of acute diverticulitis.    Postop changes of the rectum.    Bones:  Grade 1 spondylolisthesis of L3 on L4.  Grade 1 retrolisthesis of L1 on L2.    Severe central canal narrowing at L3-4.  Moderate-severe central canal narrowing at L2-3.    Mild levoscoliosis of the upper lumbar spine.                                       Medications   prochlorperazine injection Soln 2.5 mg (2.5 mg Intravenous Given 3/21/25 0528)   morphine injection 2 mg (2 mg Intravenous Given 3/21/25 1211)   levothyroxine tablet 88 mcg (88 mcg Oral Given 3/21/25 0528)   lisinopriL-hydrochlorothiazide 10-12.5 mg per tablet 1 tablet (1 tablet Oral Given 3/21/25 0825)   atorvastatin tablet 20 mg (20 mg Oral Given 3/21/25 0825)   sodium chloride 0.9% flush 10 mL (has no administration in time range)   naloxone 0.4 mg/mL injection 0.02 mg (has no administration in time range)   glucose chewable tablet 16 g (has no administration in time range)   glucose chewable tablet 24 g (has no administration in time range)   dextrose 50% injection 12.5 g (has no administration in time range)   dextrose 50% injection 25 g (has no administration in time range)   glucagon (human recombinant) injection 1 mg (has no administration in time range)   enoxaparin injection 40 mg (40 mg Subcutaneous Not Given 3/21/25 1700)   hydrOXYzine pamoate capsule 25 mg (25 mg Oral Given 3/21/25 1441)   thiamine tablet 100 mg (100 mg Oral Given 3/21/25 0825)    LORazepam tablet 2 mg (has no administration in time range)   lactated ringers infusion ( Intravenous New Bag 3/21/25 1509)   senna tablet 8.6 mg (8.6 mg Oral Given 3/21/25 1210)   folic acid tablet 1 mg (has no administration in time range)   LORazepam tablet 2 mg (2 mg Oral Not Given 3/21/25 1555)   fentaNYL 50 mcg/mL injection 100 mcg (100 mcg Intravenous Given 3/20/25 2115)   ondansetron injection 4 mg (4 mg Intravenous Given 3/20/25 2112)   ondansetron injection 4 mg (4 mg Intravenous Given 3/20/25 2251)   iohexoL (OMNIPAQUE 350) injection 75 mL (75 mLs Intravenous Given 3/20/25 2230)   lactated ringers infusion (0 mLs Intravenous Stopped 3/21/25 0140)   fentaNYL 50 mcg/mL injection 50 mcg (50 mcg Intravenous Given 3/20/25 2357)   metoclopramide injection 10 mg (10 mg Intravenous Given 3/21/25 0015)   diphenhydrAMINE injection 12.5 mg (12.5 mg Intravenous Given 3/21/25 0012)   magnesium sulfate 2g in water 50mL IVPB (premix) (0 g Intravenous Stopped 3/21/25 1411)   promethazine (PHENERGAN) 12.5 mg in 0.9% NaCl 50 mL IVPB (0 mg Intravenous Stopped 3/21/25 1023)     Medical Decision Making  See ED course for additional HPI, ROS, PE, lab, imaging, consultant discussion, procedure interpretation, and Medical Decision Making.    Lipase elevated. Significant chantal-pancreatic and duodenal edema. Pain improved. Admit for severe pancreatitis. D/w HM    Amount and/or Complexity of Data Reviewed  Labs: ordered. Decision-making details documented in ED Course.  Radiology: ordered.    Risk  Prescription drug management.  Decision regarding hospitalization.    Critical Care  Total time providing critical care: 35 minutes               ED Course as of 03/21/25 2132   Thu Mar 20, 2025   2124 On initial evaluation, patient writhing in pain complaining of severe nausea, in obvious distress.  Allergies to multiple opioids noted.  Reports tolerating fentanyl and morphine.  Fentanyl and Zofran ordered.  Abdominal examination is  not consistent with peritoneal findings in the setting of recent EGD with biopsy, but the patient is at risk for obstruction given her history of sigmoid mass with colectomy and primary anastomosis.  CT abdomen and pelvis ordered. [DS]   2216 Comp. Metabolic Panel(!) [DS]   2216 CBC W/ AUTO DIFFERENTIAL(!) [DS]   2238 New lipase elevation noted.  Independent review of CT appears to show significant pancreatic, duodenal, and possible even jejunal inflammation [DS]   2250 Patient appears more comfortable.  Still has an oxygen requirement due to opioid use but is awake and alert.  She does report a past history of heavy vodka consumption and current heavy red wine consumption.  Awaiting final read of CT   [DS]      ED Course User Index  [DS] Alexey Ogden MD                           Clinical Impression:  Final diagnoses:  [K85.00] Idiopathic acute pancreatitis without infection or necrosis (Primary)          ED Disposition Condition    Admit                   [1]   Social History  Tobacco Use    Smoking status: Never     Passive exposure: Never    Smokeless tobacco: Never   Substance Use Topics    Alcohol use: Yes     Alcohol/week: 10.0 standard drinks of alcohol     Types: 10 Glasses of wine per week    Drug use: No        Alexey Ogden MD  03/21/25 0653

## 2025-03-21 NOTE — ASSESSMENT & PLAN NOTE
Patient's blood pressure range in the last 24 hours was: BP  Min: 117/67  Max: 147/91.The patient's inpatient anti-hypertensive regimen is listed below:  Current Antihypertensives  lisinopriL-hydrochlorothiazide 10-12.5 mg per tablet 1 tablet, Daily, Oral    Plan  - resume home Lisinopril-HCTZ

## 2025-03-21 NOTE — ASSESSMENT & PLAN NOTE
Hx of acute pancreatitis in 2023, which was attributed to Ozempic. Has been off GLP-1 since that time. 3/20/25 presented to ED w/ acute onset abdominal pain, N/V. Lipase 1017 and CT A/P showing edematous pancreas c/w acute pancreatitis, no biliary duct dilation or gallstones. Differential includes hypertriglyceridemia, etoh induced. Gallstone less likely w/ negative CT findings.     - IVF maintenance  - NPO  - Pain control: IV morphine  - Antiemetics: IV Compazine   - No relief w/ zofran  - F/u lipid panel  - Encourage limiting EtOH consumption

## 2025-03-21 NOTE — ASSESSMENT & PLAN NOTE
Pt reported consuming 2-3 glasses of wine daily. Associated with increased anxiety. Pt denied having withdrawal symptoms. Tremulous on exam. May be related to presentation w/ acute pancreatitis.    - CIWA score each shift  - Thiamine and folic acid

## 2025-03-21 NOTE — ASSESSMENT & PLAN NOTE
Hx of insomnia. Takes Temazepam 15-30 mg nightly, typically 30 mg most nights and Hydroxyzine 25 mg nightly. Reports still unable to sleep well throughout the night.     - Temazepam non-formulary, will start on Lorazepam while inpatient. Initial dose 1 mg nightly, may need to increase.    - 30 mg Temazepam = 3 mg Lorazepam  - Vistaril 25 mg PRN   - Discussed risks associated with chronic benzodiazepine usage  - Will plan to discuss some non-benzo alternatives

## 2025-03-21 NOTE — ASSESSMENT & PLAN NOTE
"Hx of anxiety. Home regimen includes vistaril PRN for anxiety in addition to Temazepam nightly for insomnia.     - See "insomnia"  "

## 2025-03-21 NOTE — ED TRIAGE NOTES
Essence Tapia, an 69 y.o. female presents to the ED with c/o epigastric pain and nausea x 3 hours.

## 2025-03-22 VITALS
RESPIRATION RATE: 18 BRPM | BODY MASS INDEX: 24.38 KG/M2 | HEART RATE: 109 BPM | DIASTOLIC BLOOD PRESSURE: 84 MMHG | OXYGEN SATURATION: 92 % | HEIGHT: 62 IN | WEIGHT: 132.5 LBS | SYSTOLIC BLOOD PRESSURE: 162 MMHG | TEMPERATURE: 99 F

## 2025-03-22 LAB
ABSOLUTE EOSINOPHIL (OHS): 0.01 K/UL
ABSOLUTE MONOCYTE (OHS): 0.36 K/UL (ref 0.3–1)
ABSOLUTE NEUTROPHIL COUNT (OHS): 5.62 K/UL (ref 1.8–7.7)
ALBUMIN SERPL BCP-MCNC: 3.3 G/DL (ref 3.5–5.2)
ALP SERPL-CCNC: 66 UNIT/L (ref 40–150)
ALT SERPL W/O P-5'-P-CCNC: 22 UNIT/L (ref 10–44)
ANION GAP (OHS): 10 MMOL/L (ref 8–16)
AST SERPL-CCNC: 26 UNIT/L (ref 11–45)
BASOPHILS # BLD AUTO: 0.03 K/UL
BASOPHILS NFR BLD AUTO: 0.5 %
BILIRUB SERPL-MCNC: 0.9 MG/DL (ref 0.1–1)
BUN SERPL-MCNC: 6 MG/DL (ref 8–23)
CALCIUM SERPL-MCNC: 8.2 MG/DL (ref 8.7–10.5)
CHLORIDE SERPL-SCNC: 102 MMOL/L (ref 95–110)
CO2 SERPL-SCNC: 23 MMOL/L (ref 23–29)
CREAT SERPL-MCNC: 0.7 MG/DL (ref 0.5–1.4)
ERYTHROCYTE [DISTWIDTH] IN BLOOD BY AUTOMATED COUNT: 12.3 % (ref 11.5–14.5)
GFR SERPLBLD CREATININE-BSD FMLA CKD-EPI: >60 ML/MIN/1.73/M2
GLUCOSE SERPL-MCNC: 84 MG/DL (ref 70–110)
HCT VFR BLD AUTO: 29.7 % (ref 37–48.5)
HGB BLD-MCNC: 10.3 GM/DL (ref 12–16)
IMM GRANULOCYTES # BLD AUTO: 0.03 K/UL (ref 0–0.04)
IMM GRANULOCYTES NFR BLD AUTO: 0.5 % (ref 0–0.5)
LYMPHOCYTES # BLD AUTO: 0.46 K/UL (ref 1–4.8)
MAGNESIUM SERPL-MCNC: 1.9 MG/DL (ref 1.6–2.6)
MCH RBC QN AUTO: 33.7 PG (ref 27–50)
MCHC RBC AUTO-ENTMCNC: 34.7 G/DL (ref 32–36)
MCV RBC AUTO: 97 FL (ref 82–98)
NUCLEATED RBC (/100WBC) (OHS): 0 /100 WBC
PHOSPHATE SERPL-MCNC: 1.6 MG/DL (ref 2.7–4.5)
PLATELET # BLD AUTO: 90 K/UL (ref 150–450)
PMV BLD AUTO: 9.6 FL (ref 9.2–12.9)
POTASSIUM SERPL-SCNC: 3.2 MMOL/L (ref 3.5–5.1)
PROT SERPL-MCNC: 6 GM/DL (ref 6–8.4)
RBC # BLD AUTO: 3.06 M/UL (ref 4–5.4)
RELATIVE EOSINOPHIL (OHS): 0.2 %
RELATIVE LYMPHOCYTE (OHS): 7.1 % (ref 18–48)
RELATIVE MONOCYTE (OHS): 5.5 % (ref 4–15)
RELATIVE NEUTROPHIL (OHS): 86.2 % (ref 38–73)
SODIUM SERPL-SCNC: 135 MMOL/L (ref 136–145)
WBC # BLD AUTO: 6.51 K/UL (ref 3.9–12.7)

## 2025-03-22 PROCEDURE — 36415 COLL VENOUS BLD VENIPUNCTURE: CPT

## 2025-03-22 PROCEDURE — 85025 COMPLETE CBC W/AUTO DIFF WBC: CPT

## 2025-03-22 PROCEDURE — 25000003 PHARM REV CODE 250

## 2025-03-22 PROCEDURE — 63600175 PHARM REV CODE 636 W HCPCS

## 2025-03-22 PROCEDURE — 83735 ASSAY OF MAGNESIUM: CPT

## 2025-03-22 PROCEDURE — 80053 COMPREHEN METABOLIC PANEL: CPT

## 2025-03-22 PROCEDURE — 84100 ASSAY OF PHOSPHORUS: CPT

## 2025-03-22 RX ORDER — POTASSIUM CHLORIDE 20 MEQ/1
40 TABLET, EXTENDED RELEASE ORAL ONCE
Status: COMPLETED | OUTPATIENT
Start: 2025-03-22 | End: 2025-03-22

## 2025-03-22 RX ORDER — SODIUM,POTASSIUM PHOSPHATES 280-250MG
2 POWDER IN PACKET (EA) ORAL
Status: DISCONTINUED | OUTPATIENT
Start: 2025-03-22 | End: 2025-03-22 | Stop reason: HOSPADM

## 2025-03-22 RX ORDER — SENNOSIDES 8.6 MG/1
8.6 TABLET ORAL 2 TIMES DAILY
Status: DISCONTINUED | OUTPATIENT
Start: 2025-03-22 | End: 2025-03-22 | Stop reason: HOSPADM

## 2025-03-22 RX ORDER — MORPHINE SULFATE 15 MG/1
15 TABLET ORAL EVERY 6 HOURS PRN
Qty: 20 TABLET | Refills: 0 | Status: SHIPPED | OUTPATIENT
Start: 2025-03-22

## 2025-03-22 RX ORDER — SODIUM CHLORIDE, SODIUM LACTATE, POTASSIUM CHLORIDE, CALCIUM CHLORIDE 600; 310; 30; 20 MG/100ML; MG/100ML; MG/100ML; MG/100ML
INJECTION, SOLUTION INTRAVENOUS CONTINUOUS
Status: DISCONTINUED | OUTPATIENT
Start: 2025-03-22 | End: 2025-03-22 | Stop reason: HOSPADM

## 2025-03-22 RX ORDER — SENNOSIDES 8.6 MG/1
1 TABLET ORAL 2 TIMES DAILY
Qty: 20 TABLET | Refills: 0 | Status: SHIPPED | OUTPATIENT
Start: 2025-03-22

## 2025-03-22 RX ORDER — PROCHLORPERAZINE MALEATE 10 MG
10 TABLET ORAL EVERY 6 HOURS PRN
Qty: 16 TABLET | Refills: 0 | Status: SHIPPED | OUTPATIENT
Start: 2025-03-22

## 2025-03-22 RX ADMIN — FOLIC ACID 1 MG: 1 TABLET ORAL at 09:03

## 2025-03-22 RX ADMIN — POTASSIUM CHLORIDE 40 MEQ: 1500 TABLET, EXTENDED RELEASE ORAL at 09:03

## 2025-03-22 RX ADMIN — SENNOSIDES 8.6 MG: 8.6 TABLET, FILM COATED ORAL at 09:03

## 2025-03-22 RX ADMIN — HYDROXYZINE PAMOATE 25 MG: 25 CAPSULE ORAL at 01:03

## 2025-03-22 RX ADMIN — POTASSIUM CHLORIDE 40 MEQ: 1500 TABLET, EXTENDED RELEASE ORAL at 01:03

## 2025-03-22 RX ADMIN — ATORVASTATIN CALCIUM 20 MG: 20 TABLET, FILM COATED ORAL at 09:03

## 2025-03-22 RX ADMIN — LISINOPRIL AND HYDROCHLOROTHIAZIDE TABLETS 1 TABLET: 10; 12.5 TABLET ORAL at 09:03

## 2025-03-22 RX ADMIN — Medication 100 MG: at 09:03

## 2025-03-22 RX ADMIN — LEVOTHYROXINE SODIUM 88 MCG: 88 TABLET ORAL at 05:03

## 2025-03-22 RX ADMIN — SODIUM CHLORIDE, POTASSIUM CHLORIDE, SODIUM LACTATE AND CALCIUM CHLORIDE: 600; 310; 30; 20 INJECTION, SOLUTION INTRAVENOUS at 01:03

## 2025-03-22 RX ADMIN — MORPHINE SULFATE 2 MG: 2 INJECTION, SOLUTION INTRAMUSCULAR; INTRAVENOUS at 10:03

## 2025-03-22 RX ADMIN — POTASSIUM & SODIUM PHOSPHATES POWDER PACK 280-160-250 MG 2 PACKET: 280-160-250 PACK at 01:03

## 2025-03-22 NOTE — HOSPITAL COURSE
69 yof with pmh of chronic benzo use, prior pancreatitis episode right after starting on GLP-1, HTN, alcohol use presenting with abdominal pain and distention after recent alcohol use. Pt underwent CT abd/pelvis demonstrated concerns for pancreatitis she had 3x ULN of lipase and abdominal pain. Pancreatitis was diagnosed she was started on IVFs and given pain medications. Pt pain and nausea improved. Pt tolerated some breakfast on 3/22 without N/V/ worsening pain. Pt able to be discharged with pain medication and discussed to drink plenty of fluids and avoid alcohol/high sugary foods for the time being.

## 2025-03-22 NOTE — DISCHARGE SUMMARY
"[facility] Medicine  Discharge Summary      Patient Name: Essence Tapia  MRN: [id number]  MANUEL: [id number]  Patient Class: IP- Inpatient  Admission Date: 3/20/2025  Hospital Length of Stay: 1 days  Discharge Date and Time:  03/22/2025 11:14 AM  Attending Physician: Mozi Mcknight MD   Discharging Provider: Juan Pablo Best DO  Primary Care Provider: Easton Sadler MD  [facility] Medicine Team: [facility] 4 Juan Pablo Best DO  Primary Care Team: [facility] 4    HPI:   Essence Tapia is a 69 y.o. F with a PMHx of HTN, HLD, anxiety, chronic rhinitis, hypothyroidism, insomnia, complicated diverticulitis s/p sigmoid colon resection in 2018, pancreatitis 2/2 ozempic use in 10/2023 presenting with sudden onset severe abdominal pain and nausea. States she was babysitting her grandchild yesterday (3/20) when she had sudden onset of diffuse abdominal pain. Symptoms progressed to nausea and she had an episode of non-bloody, non-bilious emesis in the car on the way home. Throughout the evening, her symptoms progressively worsened which prompted her to call EMS. Patient states the abdominal pain is diffuse and does not radiate anywhere. Denies any lightheadedness, chest pain, dyspnea, diarrhea, constipation, or dysuria. She recently was evaluated by GI for nausea, abdominal pain, and bloating and had an EGD on 2/28 with findings c/w chronic gastritis, biopsy negative for H. Pylori. Patient has not been on Ozempic since previous bout of pancreatitis. She drinks "a couple" glasses of red wine nightly, and occasionally more at a party or with friends. Denies any other illicit drug use.     In the ED, she was HDS on room air. Labs were notable for Lipase 1017, WBC 10.5, Bicarb 18. UA still pending. CT A/P revealing edematous pancreas c/w acute pancreatitis. She was made NPO and received IVF, IV Fentanyl, and Zofran. Admitted to hospital medicine for acute pancreatitis.     * No surgery found *  "     Hospital Course:   69 yof with pmh of chronic benzo use, prior pancreatitis episode right after starting on GLP-1, HTN, alcohol use presenting with abdominal pain and distention after recent alcohol use. Pt underwent CT abd/pelvis demonstrated concerns for pancreatitis she had 3x ULN of lipase and abdominal pain. Pancreatitis was diagnosed she was started on IVFs and given pain medications. Pt pain and nausea improved. Pt tolerated some breakfast on 3/22 without N/V/ worsening pain. Pt able to be discharged with pain medication and discussed to drink plenty of fluids and avoid alcohol/high sugary foods for the time being.       Goals of Care Treatment Preferences:  Code Status: Full Code      SDOH Screening:  The patient was screened for utility difficulties, food insecurity, transport difficulties, housing insecurity, and interpersonal safety and there were no concerns identified this admission.     Consults:   Consults (From admission, onward)          Status Ordering Provider     Inpatient consult to Midline team  Once        Provider:  (Not yet assigned)    Completed KRANTHI ZEE            Assessment & Plan  Acute pancreatitis  Hx of acute pancreatitis in 2023, which was attributed to Ozempic. Has been off GLP-1 since that time. 3/20/25 presented to ED w/ acute onset abdominal pain, N/V. Lipase 1017 and CT A/P showing edematous pancreas c/w acute pancreatitis, no biliary duct dilation or gallstones. Differential includes hypertriglyceridemia, etoh induced. Gallstone less likely w/ negative CT findings.     - IVF maintenance  - Were able to advance her diet without N/V/increased pain  - Pain control: IV morphine  - Antiemetics: IV Compazine   - No relief w/ zofran  - F/u lipid panel: No hypertriglycerides  - Encourage limiting EtOH consumption    Pt able to tolerate oral intake, pain improving. Able to be discharged with pain, nausea and constipation medications. Encouraged oral intake of fluids, limit  "sugar foods and eliminate alcohol. Pt demonstrated understanding    Hypertension  Patient's blood pressure range in the last 24 hours was: BP  Min: 110/67  Max: 161/88.The patient's inpatient anti-hypertensive regimen is listed below:  Current Antihypertensives  lisinopriL-hydrochlorothiazide 10-12.5 mg per tablet 1 tablet, Daily, Oral    Plan  - resume home Lisinopril-HCTZ  Hyposomnia, insomnia or sleeplessness associated with anxiety  Hx of insomnia. Takes Temazepam 15-30 mg nightly, typically 30 mg most nights and Hydroxyzine 25 mg nightly. Reports still unable to sleep well throughout the night.     - Temazepam non-formulary, will start on Lorazepam while inpatient. Initial dose 1 mg nightly, may need to increase.    - 30 mg Temazepam = 3 mg Lorazepam  - Vistaril 25 mg PRN   - Discussed risks associated with chronic benzodiazepine usage  - Will plan to discuss some non-benzo alternatives    Hypothyroidism  10/07/24: TSH 0.132 (L), T4 0.96    - Continue Synthroid 88 mcg daily  Alcohol abuse  Pt reported consuming 2-3 glasses of wine daily. Associated with increased anxiety. Pt denied having withdrawal symptoms. Tremulous on exam. May be related to presentation w/ acute pancreatitis.    - CIWA score each shift  - Thiamine and folic acid   -Has benzodiazepines she takes at home consistently   Anxiety  Hx of anxiety. Home regimen includes vistaril PRN for anxiety in addition to Temazepam nightly for insomnia.     - See "insomnia"  Final Active Diagnoses:    Diagnosis Date Noted POA    PRINCIPAL PROBLEM:  Acute pancreatitis [K85.90] 10/09/2023 Yes    Alcohol abuse [F10.10] 10/09/2023 Yes    Anxiety [F41.9] 10/09/2023 Yes    Hypothyroidism [E03.9] 01/09/2020 Yes    Hypertension [I10] 01/09/2020 Yes    Hyposomnia, insomnia or sleeplessness associated with anxiety [F51.05, F41.9] 01/09/2020 Yes      Problems Resolved During this Admission:       Discharged Condition: stable    Disposition:     Follow Up:   Follow-up " Information       Easton Sadler MD .    Specialty: Internal Medicine  Contact information:  [address] [facility]  [place] [address]  [phone]                           Patient Instructions:   No discharge procedures on file.    Significant Diagnostic Studies: Labs: CMP   Recent Labs   Lab 03/20/25 2128 03/21/25 0335 03/22/25 0513    136 135*   K 3.8 4.1 3.2*    103 102   CO2 18* 21* 23    152*  --    BUN 9 9 6*   CREATININE 0.7 0.8 0.7   CALCIUM 8.4* 8.3* 8.2*   PROT 7.1 6.7  --    ALBUMIN 4.1 3.8 3.3*   BILITOT 0.3 0.6 0.9   ALKPHOS 84 77 66   AST 40 36 26   ALT 38 34 22   ANIONGAP 16 12 10   , CBC   Recent Labs   Lab 03/20/25 2128 03/21/25 0335 03/22/25  0513   WBC 10.51 8.10 6.51   HGB 11.8* 11.2* 10.3*   HCT 32.9* 33.0* 29.7*   * 118* 90*   , and Lipid Panel   Lab Results   Component Value Date    CHOL 227 (H) 03/21/2025    HDL 79 (H) 03/21/2025    LDLCALC 134.8 03/21/2025    TRIG 66 03/21/2025    CHOLHDL 34.8 03/21/2025       Pending Diagnostic Studies:       None           Medications:  Reconciled Home Medications:      Medication List        START taking these medications      morphine 15 MG tablet  Commonly known as: MSIR  Take 1 tablet (15 mg total) by mouth every 6 (six) hours as needed for Pain.     prochlorperazine 10 MG tablet  Commonly known as: COMPAZINE  Take 1 tablet (10 mg total) by mouth every 6 (six) hours as needed (nausea).     senna 8.6 mg tablet  Commonly known as: SENOKOT  Take 1 tablet by mouth 2 (two) times a day. If you start having too many bowel movements do not take. This is to prevent opioid constipation            CHANGE how you take these medications      hydrOXYzine HCL 25 MG tablet  Commonly known as: ATARAX  Take 1 tablet (25 mg total) by mouth daily as needed for Anxiety.  What changed: when to take this     lisinopriL-hydrochlorothiazide 10-12.5 mg per tablet  Commonly known as: PRINZIDE,ZESTORETIC  Take 1 tablet by mouth once  daily.  What changed: when to take this            CONTINUE taking these medications      AdviL 200 MG tablet  Generic drug: ibuprofen  Take 400 mg by mouth daily as needed (headache).     ADVIL PM ORAL  Take 1 tablet by mouth nightly as needed (sleep).     levothyroxine 88 MCG tablet  Commonly known as: SYNTHROID  TAKE 1 TABLET BY MOUTH EVERY DAY     loratadine 10 mg tablet  Commonly known as: CLARITIN  Take 10 mg by mouth daily as needed for Allergies.     LUMIFY OPHT  Place 1 drop into both eyes daily as needed (redness).     METAMUCIL SUGAR FREE Powd  Generic drug: psyllium  Place 1 tbsp into beverage and drink daily     potassium chloride 10 MEQ Tbsr  Commonly known as: KLOR-CON  Take 1 tablet (10 mEq total) by mouth once daily.     rosuvastatin 5 MG tablet  Commonly known as: CRESTOR  Take 1 tablet (5 mg total) by mouth once daily.     temazepam 15 mg Cap  Commonly known as: RESTORIL  Take 2 capsules (30 mg total) by mouth nightly as needed (sleep disturbance).     UNABLE TO FIND  Take 1 tablet by mouth Daily. medication name: BIO COMPLETE 3              Indwelling Lines/Drains at time of discharge:   Lines/Drains/Airways       None                   Time spent on the discharge of patient: 35 minutes         Juan Pablo Best DO  Department of Hospital Medicine  [facility]

## 2025-03-22 NOTE — PLAN OF CARE
03/22/25 1145   Post-Acute Status   Post-Acute Authorization Other   Other Status No Post-Acute Service Needs   Discharge Delays None known at this time   Discharge Plan   Discharge Plan A Home   Discharge Plan B Home     Patient cleared for discharge from case management standpoint.      Chart and discharge orders reviewed.  Patient discharged home with no further case management needs.    Discharge Plan A and Plan B have been determined by review of patient's clinical status, future medical and therapeutic needs, and coverage/benefits for post-acute care in coordination with multidisciplinary team members.       Randi Choudhary, JERICHO  - [facility]

## 2025-03-22 NOTE — NURSING
"BP (!) 162/84   Pulse 109   Temp 99 °F (37.2 °C) (Oral)   Resp 18   Ht 5' 2" (1.575 m)   Wt 60.1 kg (132 lb 7.9 oz)   SpO2 (!) 92%   BMI 24.23 kg/m²     Patient discharged to home. Discharge instructions reviewed with patient. Prescriptions called in. IV removed, pressure held, dressing applied.    "

## 2025-03-22 NOTE — PLAN OF CARE
[facility]  Discharge Final Note    Primary Care Provider: Easton Sadler MD    Expected Discharge Date: 3/22/2025    Final Discharge Note (most recent)       Final Note - 03/22/25 1147          Final Note    Assessment Type Final Discharge Note     Anticipated Discharge Disposition Home or Self Care     What phone number can be called within the next 1-3 days to see how you are doing after discharge? [phone]     Hospital Resources/Appts/Education Provided Provided patient/caregiver with written discharge plan information;Appointments scheduled and added to AVS        Post-Acute Status    Post-Acute Authorization Other     Coverage PHN     Other Status No Post-Acute Service Needs     Discharge Delays None known at this time                     Important Message from Medicare             Contact Info       Easton Sadler MD   Specialty: Internal Medicine   Relationship: PCP - General    [address] [facility] [address]   Phone: [phone]       Next Steps: Follow up             Patient medically ready for discharge to home. Family/patient aware of discharge.          Randi Choudhary LMSW  - [facility]

## 2025-03-22 NOTE — ASSESSMENT & PLAN NOTE
Hx of acute pancreatitis in 2023, which was attributed to Ozempic. Has been off GLP-1 since that time. 3/20/25 presented to ED w/ acute onset abdominal pain, N/V. Lipase 1017 and CT A/P showing edematous pancreas c/w acute pancreatitis, no biliary duct dilation or gallstones. Differential includes hypertriglyceridemia, etoh induced. Gallstone less likely w/ negative CT findings.     - IVF maintenance  - Were able to advance her diet without N/V/increased pain  - Pain control: IV morphine  - Antiemetics: IV Compazine   - No relief w/ zofran  - F/u lipid panel: No hypertriglycerides  - Encourage limiting EtOH consumption    Pt able to tolerate oral intake, pain improving. Able to be discharged with pain, nausea and constipation medications. Encouraged oral intake of fluids, limit sugar foods and eliminate alcohol. Pt demonstrated understanding

## 2025-03-22 NOTE — ASSESSMENT & PLAN NOTE
Patient's blood pressure range in the last 24 hours was: BP  Min: 110/67  Max: 161/88.The patient's inpatient anti-hypertensive regimen is listed below:  Current Antihypertensives  lisinopriL-hydrochlorothiazide 10-12.5 mg per tablet 1 tablet, Daily, Oral    Plan  - resume home Lisinopril-HCTZ

## 2025-03-22 NOTE — CONSULTS
[facility] VASCULAR ACCESS NOTE       Bed:1002/1002 A    20G x 1.75IN [facility].    Indication: PVA  Attempts: 2    Shaylee Nicholson RN

## 2025-03-22 NOTE — NURSING
Pt's IV's removed by accident by pt while she was sleeping around 0200 and 0510. This RN and another RN on floor attempted multiple times to get IV unsuccessfully. Charge RN updated to see if an IV with ultrasound can be completed. Still waiting for ultrasound IV team at this time.

## 2025-03-22 NOTE — PLAN OF CARE
Hospital follow-up appointment was scheduled by CHW.  The patient follow-up appointment was for 3/26/25 at 1:30 pm, at [facility].      Wilder DIEGO, RSW  Case Management

## 2025-03-22 NOTE — NURSING
CIWA score 4 upon shift change. Admission assessment completed. Pt educated on plan of care. Pt stated she would want to wait to give ordered scheduled lorazepam later in the evening. Pt educated on alcohol withdrawal management and risks of alcohol withdrawal. Pt verbalized understanding. Seizure precautions placed on pt incluididng Oxygen and suction at bedside and side rails padded. Pt denies pain at this time. Dr. Navarro paged and came up to bedside to talk with pt. After talking with pt, pt agreeable to take medication. Medication given.

## 2025-03-22 NOTE — ASSESSMENT & PLAN NOTE
Pt reported consuming 2-3 glasses of wine daily. Associated with increased anxiety. Pt denied having withdrawal symptoms. Tremulous on exam. May be related to presentation w/ acute pancreatitis.    - CIWA score each shift  - Thiamine and folic acid   -Has benzodiazepines she takes at home consistently

## 2025-03-24 DIAGNOSIS — Z00.00 ENCOUNTER FOR MEDICARE ANNUAL WELLNESS EXAM: ICD-10-CM

## 2025-03-25 ENCOUNTER — PATIENT OUTREACH (OUTPATIENT)
Dept: ADMINISTRATIVE | Facility: CLINIC | Age: 70
End: 2025-03-25
Payer: MEDICARE

## 2025-03-25 ENCOUNTER — TELEPHONE (OUTPATIENT)
Dept: INTERNAL MEDICINE | Facility: CLINIC | Age: 70
End: 2025-03-25
Payer: MEDICARE

## 2025-03-25 NOTE — PROGRESS NOTES
C3 nurse spoke with Essence Tapia  for a TCC post hospital discharge follow up call. The patient has a scheduled HOSFU appointment with Blake Morales FNP-C on 3/26/2025 at 1:30 PM.

## 2025-03-25 NOTE — PROGRESS NOTES
C3 nurse attempted to contact Essence Tapia  for a TCC post hospital discharge follow up call. No answer. Left voicemail with callback information. The patient has a scheduled HOSFU appointment with Blake Morales FNP-C on 3/26/2025 at 1:30 PM.

## 2025-03-26 ENCOUNTER — OFFICE VISIT (OUTPATIENT)
Dept: INTERNAL MEDICINE | Facility: CLINIC | Age: 70
End: 2025-03-26
Payer: MEDICARE

## 2025-03-26 ENCOUNTER — LAB VISIT (OUTPATIENT)
Dept: LAB | Facility: HOSPITAL | Age: 70
End: 2025-03-26
Payer: MEDICARE

## 2025-03-26 VITALS
TEMPERATURE: 97 F | DIASTOLIC BLOOD PRESSURE: 70 MMHG | BODY MASS INDEX: 24.23 KG/M2 | SYSTOLIC BLOOD PRESSURE: 138 MMHG | OXYGEN SATURATION: 98 % | RESPIRATION RATE: 14 BRPM | HEIGHT: 62 IN | HEART RATE: 88 BPM

## 2025-03-26 DIAGNOSIS — F51.05 HYPOSOMNIA, INSOMNIA OR SLEEPLESSNESS ASSOCIATED WITH ANXIETY: ICD-10-CM

## 2025-03-26 DIAGNOSIS — E03.9 HYPOTHYROIDISM, UNSPECIFIED TYPE: ICD-10-CM

## 2025-03-26 DIAGNOSIS — F10.10 ALCOHOL ABUSE: ICD-10-CM

## 2025-03-26 DIAGNOSIS — E78.2 MIXED HYPERLIPIDEMIA: ICD-10-CM

## 2025-03-26 DIAGNOSIS — Z87.19 HISTORY OF PANCREATITIS: ICD-10-CM

## 2025-03-26 DIAGNOSIS — I70.0 AORTIC ATHEROSCLEROSIS: ICD-10-CM

## 2025-03-26 DIAGNOSIS — F41.9 ANXIETY: ICD-10-CM

## 2025-03-26 DIAGNOSIS — Z87.19 HISTORY OF PANCREATITIS: Primary | ICD-10-CM

## 2025-03-26 DIAGNOSIS — I10 HYPERTENSION, UNSPECIFIED TYPE: ICD-10-CM

## 2025-03-26 DIAGNOSIS — F41.9 HYPOSOMNIA, INSOMNIA OR SLEEPLESSNESS ASSOCIATED WITH ANXIETY: ICD-10-CM

## 2025-03-26 PROCEDURE — 99999 PR PBB SHADOW E&M-EST. PATIENT-LVL IV: CPT | Mod: PBBFAC,,,

## 2025-03-26 PROCEDURE — 36416 COLLJ CAPILLARY BLOOD SPEC: CPT | Mod: PO

## 2025-03-26 PROCEDURE — 85025 COMPLETE CBC W/AUTO DIFF WBC: CPT

## 2025-03-26 NOTE — PROGRESS NOTES
Subjective:       Patient ID: Essence Tapia is a 69 y.o. female.    Chief Complaint: Hospital Follow Up  Transitional Care Note    Family and/or Caretaker present at visit?  No.  Diagnostic tests reviewed/disposition: I have reviewed all completed as well as pending diagnostic tests at the time of discharge.  Disease/illness education: Pancreatitis, alcohol abuse  Home health/community services discussion/referrals: Patient does not have home health established from hospital visit.  They do not need home health.  If needed, we will set up home health for the patient.   Establishment or re-establishment of referral orders for community resources: No other necessary community resources.   Discussion with other health care providers: No discussion with other health care providers necessary.       69-year-old female presents to clinic following one night admission for management of acute pancreatitis.  She reports 1 previous episode of pancreatitis following GLP 1 administration 5 years ago.  She presented to the emergency room on March 20th for evaluation of acute onset abdominal pain with nausea.    ED Course:  In the ED, she was HDS on room air. Labs were notable for Lipase 1017, WBC 10.5, Bicarb 18. UA still pending. CT A/P revealing edematous pancreas c/w acute pancreatitis. She was made NPO and received IVF, IV Fentanyl, and Zofran. Admitted to hospital medicine for acute pancreatitis.      * No surgery found *       Hospital Course:   69 yof with pmh of chronic benzo use, prior pancreatitis episode right after starting on GLP-1, HTN, alcohol use presenting with abdominal pain and distention after recent alcohol use. Pt underwent CT abd/pelvis demonstrated concerns for pancreatitis she had 3x ULN of lipase and abdominal pain. Pancreatitis was diagnosed she was started on IVFs and given pain medications. Pt pain and nausea improved. Pt tolerated some breakfast on 3/22 without N/V/ worsening pain. Pt able to  be discharged with pain medication and discussed to drink plenty of fluids and avoid alcohol/high sugary foods for the time being.    Narrative & Impression  EXAMINATION:  CT ABDOMEN PELVIS WITH IV CONTRAST    Impression:  1. Acute pancreatitis.  Recommend clinical correlation and follow-up.  2. Diverticulosis of the colon.  3. Multilevel chronic degenerative changes of the lumbar spine.  4. Hepatic steatosis.  5. This report was flagged in Epic as abnormal.      Today she reports she is back at her baseline health.  Denies ongoing nausea vomiting, or abdominal pain since discharge from the hospital. She reports one rather significant episode of diarrhea at home but denies any other episodes. Denies blood in stool. She has been tolerating p.o. intake at home without difficulty. Per her report, she remains alcohol free and does not consider herself to have any issues with drinking.       Objective:      Physical Exam  Constitutional:       General: She is not in acute distress.     Appearance: Normal appearance. She is not toxic-appearing.   HENT:      Head: Normocephalic and atraumatic.      Mouth/Throat:      Mouth: Mucous membranes are moist.      Pharynx: Oropharynx is clear. No oropharyngeal exudate.   Eyes:      General: No scleral icterus.     Conjunctiva/sclera: Conjunctivae normal.      Pupils: Pupils are equal, round, and reactive to light.   Cardiovascular:      Rate and Rhythm: Normal rate and regular rhythm.      Pulses: Normal pulses.      Heart sounds: Normal heart sounds. No murmur heard.     No friction rub. No gallop.   Pulmonary:      Effort: Pulmonary effort is normal. No respiratory distress.      Breath sounds: Normal breath sounds. No wheezing, rhonchi or rales.   Abdominal:      General: Bowel sounds are normal. There is no distension.      Palpations: Abdomen is soft. There is no mass.      Tenderness: There is no guarding.      Comments: Mild tenderness still present over epigastrium on  "exam.    Musculoskeletal:      Cervical back: Normal range of motion and neck supple.   Neurological:      Mental Status: She is alert.           Physical Exam            Assessment:       1. History of pancreatitis  - CBC W/ AUTO DIFFERENTIAL; Future  - Comprehensive Metabolic Panel; Future  - Lipase; Future    2. Hypertension, unspecified type    3. Mixed hyperlipidemia    4. Aortic atherosclerosis    5. Hypothyroidism, unspecified type    6. Anxiety    7. Hyposomnia, insomnia or sleeplessness associated with anxiety    8. Alcohol abuse      Plan:         Assessment & Plan       Essence "Netta" was seen today for hospital follow up.    Diagnoses and all orders for this visit:    History of pancreatitis  -     CBC W/ AUTO DIFFERENTIAL; Future  -     Comprehensive Metabolic Panel; Future  -     Lipase; Future  Patient appears to be recovering well with no residual effects.  Encouraged patient to continue advancing her diet as tolerated. Emphasized importance on alcohol cessation as well as limiting foods and increase sugar/fats to prevent future exacerbations of pancreatitis.  Check CBC, CMP, and lipase status post discharge to assure patient is returning to baseline    Hypertension, unspecified type  Chronic, stable continue lisinopril hydrochlorothiazide combination as prescribed.  Monitor.    Hyperlipidemia/Aortic atherosclerosis  Chronic, stable.  Continue rosuvastatin as prescribed.    Hypothyroidism, unspecified type  Chronic, stable.  Continue levothyroxine as prescribed.    Anxiety  Chronic, symptoms well-controlled.  Continue hydroxyzine as prescribed.  Monitor.    Hyposomnia, insomnia or sleeplessness associated with anxiety  Chronic, stable.  Discussed potential complications of long-term benzodiazepine use.  Discussed the hydroxyzine may also be beneficial in treating insomnia symptoms.  For now, continue temazepam as prescribed on as needed basis.     Alcohol abuse  Chronic.  Patient previously " drinking 2-3 glasses of red wine nightly.  Discussed at length why alcohol cessation would be beneficial to not only preventing exacerbations of pancreatitis but for overall health, VU.  Resources available.  Monitor.      RTC PRN         This note was generated with the assistance of ambient listening technology. Verbal consent was obtained by the patient and accompanying visitor(s) for the recording of patient appointment to facilitate this note. I attest to having reviewed and edited the generated note for accuracy, though some syntax or spelling errors may persist. Please contact the author of this note for any clarification.

## 2025-03-27 ENCOUNTER — RESULTS FOLLOW-UP (OUTPATIENT)
Dept: INTERNAL MEDICINE | Facility: CLINIC | Age: 70
End: 2025-03-27

## 2025-03-27 LAB
ABSOLUTE EOSINOPHIL (OHS): 0.12 K/UL
ABSOLUTE MONOCYTE (OHS): 0.81 K/UL (ref 0.3–1)
ABSOLUTE NEUTROPHIL COUNT (OHS): 4.89 K/UL (ref 1.8–7.7)
BASOPHILS # BLD AUTO: 0.05 K/UL
BASOPHILS NFR BLD AUTO: 0.7 %
ERYTHROCYTE [DISTWIDTH] IN BLOOD BY AUTOMATED COUNT: 12.2 % (ref 11.5–14.5)
HCT VFR BLD AUTO: 29.3 % (ref 37–48.5)
HGB BLD-MCNC: 10.6 GM/DL (ref 12–16)
IMM GRANULOCYTES # BLD AUTO: 0.2 K/UL (ref 0–0.04)
IMM GRANULOCYTES NFR BLD AUTO: 2.9 % (ref 0–0.5)
LYMPHOCYTES # BLD AUTO: 0.83 K/UL (ref 1–4.8)
MCH RBC QN AUTO: 32.9 PG (ref 27–50)
MCHC RBC AUTO-ENTMCNC: 36.2 G/DL (ref 32–36)
MCV RBC AUTO: 91 FL (ref 82–98)
NUCLEATED RBC (/100WBC) (OHS): 0 /100 WBC
PLATELET # BLD AUTO: 252 K/UL (ref 150–450)
PLATELET BLD QL SMEAR: NORMAL
PMV BLD AUTO: 10.6 FL (ref 9.2–12.9)
RBC # BLD AUTO: 3.22 M/UL (ref 4–5.4)
RELATIVE EOSINOPHIL (OHS): 1.7 %
RELATIVE LYMPHOCYTE (OHS): 12 % (ref 18–48)
RELATIVE MONOCYTE (OHS): 11.7 % (ref 4–15)
RELATIVE NEUTROPHIL (OHS): 71 % (ref 38–73)
WBC # BLD AUTO: 6.9 K/UL (ref 3.9–12.7)

## 2025-03-28 ENCOUNTER — LAB VISIT (OUTPATIENT)
Dept: LAB | Facility: HOSPITAL | Age: 70
End: 2025-03-28
Payer: MEDICARE

## 2025-03-28 ENCOUNTER — TELEPHONE (OUTPATIENT)
Dept: INTERNAL MEDICINE | Facility: CLINIC | Age: 70
End: 2025-03-28
Payer: MEDICARE

## 2025-03-28 ENCOUNTER — OFFICE VISIT (OUTPATIENT)
Dept: INTERNAL MEDICINE | Facility: CLINIC | Age: 70
End: 2025-03-28
Payer: MEDICARE

## 2025-03-28 VITALS
DIASTOLIC BLOOD PRESSURE: 78 MMHG | TEMPERATURE: 99 F | OXYGEN SATURATION: 97 % | RESPIRATION RATE: 18 BRPM | SYSTOLIC BLOOD PRESSURE: 136 MMHG | BODY MASS INDEX: 24.23 KG/M2 | HEART RATE: 74 BPM | HEIGHT: 62 IN

## 2025-03-28 DIAGNOSIS — F41.9 HYPOSOMNIA, INSOMNIA OR SLEEPLESSNESS ASSOCIATED WITH ANXIETY: Primary | ICD-10-CM

## 2025-03-28 DIAGNOSIS — I10 HYPERTENSION, UNSPECIFIED TYPE: ICD-10-CM

## 2025-03-28 DIAGNOSIS — Z78.0 POST-MENOPAUSAL: ICD-10-CM

## 2025-03-28 DIAGNOSIS — Z12.31 ENCOUNTER FOR SCREENING MAMMOGRAM FOR BREAST CANCER: ICD-10-CM

## 2025-03-28 DIAGNOSIS — F51.05 HYPOSOMNIA, INSOMNIA OR SLEEPLESSNESS ASSOCIATED WITH ANXIETY: Primary | ICD-10-CM

## 2025-03-28 DIAGNOSIS — F41.9 ANXIETY: ICD-10-CM

## 2025-03-28 DIAGNOSIS — Z87.19 HISTORY OF PANCREATITIS: ICD-10-CM

## 2025-03-28 DIAGNOSIS — I10 HYPERTENSION, UNSPECIFIED TYPE: Primary | ICD-10-CM

## 2025-03-28 DIAGNOSIS — E78.2 MIXED HYPERLIPIDEMIA: ICD-10-CM

## 2025-03-28 DIAGNOSIS — K57.90 DIVERTICULAR DISEASE: ICD-10-CM

## 2025-03-28 DIAGNOSIS — R10.9 ABDOMINAL PAIN, UNSPECIFIED ABDOMINAL LOCATION: ICD-10-CM

## 2025-03-28 PROBLEM — R07.9 CHEST PAIN: Status: RESOLVED | Noted: 2020-05-04 | Resolved: 2025-03-28

## 2025-03-28 LAB
ALBUMIN SERPL BCP-MCNC: 3.7 G/DL (ref 3.5–5.2)
ALP SERPL-CCNC: 71 UNIT/L (ref 40–150)
ALT SERPL W/O P-5'-P-CCNC: 18 UNIT/L (ref 10–44)
ANION GAP (OHS): 11 MMOL/L (ref 8–16)
AST SERPL-CCNC: 25 UNIT/L (ref 11–45)
BILIRUB SERPL-MCNC: 0.5 MG/DL (ref 0.1–1)
BUN SERPL-MCNC: 9 MG/DL (ref 8–23)
CALCIUM SERPL-MCNC: 9.4 MG/DL (ref 8.7–10.5)
CHLORIDE SERPL-SCNC: 102 MMOL/L (ref 95–110)
CO2 SERPL-SCNC: 21 MMOL/L (ref 23–29)
CREAT SERPL-MCNC: 0.7 MG/DL (ref 0.5–1.4)
GFR SERPLBLD CREATININE-BSD FMLA CKD-EPI: >60 ML/MIN/1.73/M2
GLUCOSE SERPL-MCNC: 99 MG/DL (ref 70–110)
LIPASE SERPL-CCNC: 211 U/L (ref 4–60)
POTASSIUM SERPL-SCNC: 3.9 MMOL/L (ref 3.5–5.1)
PROT SERPL-MCNC: 7.2 GM/DL (ref 6–8.4)
SODIUM SERPL-SCNC: 134 MMOL/L (ref 136–145)

## 2025-03-28 PROCEDURE — 36415 COLL VENOUS BLD VENIPUNCTURE: CPT | Mod: PO

## 2025-03-28 PROCEDURE — 83690 ASSAY OF LIPASE: CPT

## 2025-03-28 PROCEDURE — 80053 COMPREHEN METABOLIC PANEL: CPT

## 2025-03-28 PROCEDURE — 99999 PR PBB SHADOW E&M-EST. PATIENT-LVL V: CPT | Mod: PBBFAC,,,

## 2025-03-28 RX ORDER — TEMAZEPAM 15 MG/1
30 CAPSULE ORAL NIGHTLY PRN
Qty: 60 CAPSULE | Refills: 0 | Status: SHIPPED | OUTPATIENT
Start: 2025-03-28 | End: 2025-04-27

## 2025-03-28 NOTE — PROGRESS NOTES
Essence Tapia  1955        Subjective     Chief Complaint: Follow-up      History of Present Illness:  Ms. Essence Tapia is a 69 y.o. female who presents to clinic for follow up    Recently hospitalized for acute pancreatitis after alcohol use and evaluated by Blake Morales in clinic for hospital follow up on 3/26.    Mammo: not utd  C-scope: not utd  DEXA: not utd    History of Present Illness    CHIEF COMPLAINT:  Ms. Tapia presents today for follow up after hospitalization for pancreatitis.    RECENT PANCREATITIS:  She reports recent hospitalization for pancreatitis with weight gain from 114-115 lbs to 130 lbs due to abdominal swelling. She states improvement in her condition with subsiding abdominal swelling. While abdominal tenderness has improved significantly, she notes minimal residual tenderness throughout the abdomen. She reports decreased appetite and denies current nausea. She has been compliant with dietary recommendations, including abstaining from red wine.    SLEEP:  She reports difficulty with sleep onset, often staying awake until 5-6 AM and waking around 7:30 AM, experiencing bursts of energy at night. She takes Advil PM occasionally and finds Temazepam (Restoril) helpful for sleep. She attributes sleep issues to reduced activity levels and changes in routine.    MEDICAL HISTORY:  She has a history of septic diverticulitis. She underwent liposuction in June which was unsuccessful.    MEDICATIONS:  She takes lisinopril for blood pressure management.    PHYSICAL ACTIVITY:  She achieves 11,000 steps daily through routine activities.      ROS:  Constitutional: +loss in appetite  Gastrointestinal: +abdominal pain, +abdominal distention  Genitourinary: -hematuria  Neurological: +sleep difficulty, +difficulty falling asleep        PAST HISTORY:     Past Medical History:   Diagnosis Date    Diverticulitis     Edema     Hypertension     Spinal stenosis     Thyroid disease        Past  Surgical History:   Procedure Laterality Date    APPENDECTOMY      BREAST SURGERY  2023     SECTION, CLASSIC      COLON SURGERY      COSMETIC SURGERY      ESOPHAGOGASTRODUODENOSCOPY N/A 2025    Procedure: EGD (ESOPHAGOGASTRODUODENOSCOPY);  Surgeon: Jann Doyle MD;  Location: [facility];  Service: Endoscopy;  Laterality: N/A;  Ref by MD FABI Clark, copy in hand - PC  -Precall complete-BM  2. precall attempted; lvm with callback number; AP    LIPOSUCTION  2023    NASAL RECONSTRUCTION      SIGMOIDECTOMY      TONSILLECTOMY      TOTAL REDUCTION MAMMOPLASTY Bilateral     and lift       Family History   Problem Relation Name Age of Onset    No Known Problems Mother      COPD Father Gerry Hamm     Breast cancer Sister  48    Arthritis Maternal Grandmother Vaishali Wilson     Cancer Sister Angie Handy-Breast cancer        Social History     Socioeconomic History    Marital status:    Tobacco Use    Smoking status: Never     Passive exposure: Never    Smokeless tobacco: Never   Substance and Sexual Activity    Alcohol use: Yes     Alcohol/week: 10.0 standard drinks of alcohol     Types: 10 Glasses of wine per week    Drug use: No    Sexual activity: Yes     Partners: Male     Birth control/protection: None     Comment: I am 69 years old     Social Drivers of Health     Financial Resource Strain: Low Risk  (3/21/2025)    Overall Financial Resource Strain (CARDIA)     Difficulty of Paying Living Expenses: Not very hard   Food Insecurity: No Food Insecurity (3/21/2025)    Hunger Vital Sign     Worried About Running Out of Food in the Last Year: Never true     Ran Out of Food in the Last Year: Never true   Transportation Needs: No Transportation Needs (3/21/2025)    PRAPARE - Transportation     Lack of Transportation (Medical): No     Lack of Transportation (Non-Medical): No   Physical Activity: Inactive (3/21/2025)    Exercise Vital Sign     Days of Exercise  per Week: 0 days     Minutes of Exercise per Session: 0 min   Stress: No Stress Concern Present (3/21/2025)    [employer] - Occupational Stress Questionnaire     Feeling of Stress : Not at all   Recent Concern: Stress - Stress Concern Present (2/18/2025)    [employer] - Occupational Stress Questionnaire     Feeling of Stress : To some extent   Housing Stability: Low Risk  (3/21/2025)    Housing Stability Vital Sign     Unable to Pay for Housing in the Last Year: No     Number of Times Moved in the Last Year: 0     Homeless in the Last Year: No       MEDICATIONS & ALLERGIES:     Current Outpatient Medications on File Prior to Visit   Medication Sig    brimonidine tartrate (LUMIFY OPHT) Place 1 drop into both eyes daily as needed (redness).    hydrOXYzine HCL (ATARAX) 25 MG tablet Take 1 tablet (25 mg total) by mouth daily as needed for Anxiety.    ibuprofen (ADVIL) 200 MG tablet Take 400 mg by mouth daily as needed (headache).    ibuprofen/diphenhydramine cit (ADVIL PM ORAL) Take 1 tablet by mouth nightly as needed (sleep).    levothyroxine (SYNTHROID) 88 MCG tablet TAKE 1 TABLET BY MOUTH EVERY DAY    lisinopriL-hydrochlorothiazide (PRINZIDE,ZESTORETIC) 10-12.5 mg per tablet Take 1 tablet by mouth once daily.    loratadine (CLARITIN) 10 mg tablet Take 10 mg by mouth daily as needed for Allergies.    morphine (MSIR) 15 MG tablet Take 1 tablet (15 mg total) by mouth every 6 (six) hours as needed for Pain.    potassium chloride (KLOR-CON) 10 MEQ TbSR Take 1 tablet (10 mEq total) by mouth once daily.    prochlorperazine (COMPAZINE) 10 MG tablet Take 1 tablet (10 mg total) by mouth every 6 (six) hours as needed (nausea).    psyllium (METAMUCIL SUGAR FREE) Powd Place 1 tbsp into beverage and drink daily    rosuvastatin (CRESTOR) 5 MG tablet Take 1 tablet (5 mg total) by mouth once daily.    senna (SENOKOT) 8.6 mg tablet Take 1 tablet by mouth 2 (two) times a day.  "If you start having too many bowel movements do not take. This is to prevent opioid constipation    UNABLE TO FIND Take 1 tablet by mouth Daily. medication name: BIO COMPLETE 3    [DISCONTINUED] temazepam (RESTORIL) 15 mg Cap Take 2 capsules (30 mg total) by mouth nightly as needed (sleep disturbance).     No current facility-administered medications on file prior to visit.       Review of patient's allergies indicates:   Allergen Reactions    Codeine     Hydrocodone Hives, Nausea And Vomiting and Other (See Comments)     hallucinations    Hydromorphone Other (See Comments)     hallucinations    Latex Itching    Tramadol        OBJECTIVE:     Vital Signs:  Vitals:    03/28/25 1308 03/28/25 1423   BP: (!) 142/80 136/78   BP Location: Right arm Right arm   Patient Position: Sitting Sitting   Pulse: 74    Resp: 18    Temp: 98.9 °F (37.2 °C)    TempSrc: Oral    SpO2: 97%    Height: 5' 2" (1.575 m)        Body mass index is 24.23 kg/m².     Physical Exam:  Physical Exam  Vitals and nursing note reviewed.   Constitutional:       General: She is not in acute distress.     Appearance: She is not ill-appearing.   HENT:      Head: Normocephalic and atraumatic.      Mouth/Throat:      Mouth: Mucous membranes are moist.      Pharynx: Oropharynx is clear. No oropharyngeal exudate or posterior oropharyngeal erythema.   Eyes:      Extraocular Movements: Extraocular movements intact.      Conjunctiva/sclera: Conjunctivae normal.   Cardiovascular:      Rate and Rhythm: Normal rate and regular rhythm.   Pulmonary:      Effort: Pulmonary effort is normal. No respiratory distress.      Breath sounds: Normal breath sounds. No wheezing or rales.   Chest:      Chest wall: No tenderness.   Abdominal:      General: Abdomen is flat. There is no distension.      Palpations: Abdomen is soft.      Tenderness: There is abdominal tenderness (LLQ). There is no guarding.   Musculoskeletal:         General: Normal range of motion.      Cervical " "back: Normal range of motion and neck supple. No tenderness.      Right lower leg: No edema.      Left lower leg: No edema.   Lymphadenopathy:      Cervical: No cervical adenopathy.   Skin:     General: Skin is warm and dry.   Neurological:      Mental Status: She is alert and oriented to person, place, and time.            Laboratory  Lab Results   Component Value Date    WBC 6.90 03/26/2025    HGB 10.6 (L) 03/26/2025    HCT 29.3 (L) 03/26/2025    MCV 91 03/26/2025     03/26/2025     Lab Results   Component Value Date     (H) 03/21/2025     (L) 03/22/2025    K 3.2 (L) 03/22/2025     03/22/2025    CO2 23 03/22/2025    BUN 6 (L) 03/22/2025    CREATININE 0.7 03/22/2025    CALCIUM 8.2 (L) 03/22/2025    MG 1.9 03/22/2025     Lab Results   Component Value Date    INR 1.0 09/09/2004     Lab Results   Component Value Date    HGBA1C 4.5 06/13/2023     No results for input(s): "POCTGLUCOSE" in the last 72 hours.      Health Maintenance         Date Due Completion Date    Pneumococcal Vaccines (Age 50+) (1 of 2 - PCV) Never done ---    RSV Vaccine (Age 60+ and Pregnant patients) (1 - Risk 60-74 years 1-dose series) Never done ---    DEXA Scan 09/30/2022 9/30/2019    Shingles Vaccine (2 of 2) 04/27/2024 3/2/2024    Colorectal Cancer Screening 08/19/2024 8/19/2021    COVID-19 Vaccine (2 - 2024-25 season) 09/01/2024 6/23/2021    Mammogram 03/19/2025 3/19/2024    High Dose Statin 03/26/2026 3/26/2025    Lipid Panel 03/21/2030 3/21/2025    TETANUS VACCINE 03/10/2032 3/10/2022            ASSESSMENT & PLAN:   69 y.o. female who was seen today in clinic for follow up    Hyposomnia, insomnia or sleeplessness associated with anxiety  -     temazepam (RESTORIL) 15 mg Cap; Take 2 capsules (30 mg total) by mouth nightly as needed (sleep disturbance).  Dispense: 60 capsule; Refill: 0    Diverticular disease    History of pancreatitis    Hypertension, unspecified type    Encounter for screening mammogram for breast " cancer  -     Mammo Digital Screening Bilat w/ Wilner (XPD); Future; Expected date: 03/28/2025    Post-menopausal  -     DXA Bone Density Axial Skeleton 1 or more sites; Future; Expected date: 03/28/2025         1. Hyposomnia, insomnia or sleeplessness associated with anxiety    2. Diverticular disease    3. History of pancreatitis    4. Hypertension, unspecified type    5. Encounter for screening mammogram for breast cancer    6. Post-menopausal      Worsening sleep with recently discontinuing alcohol.  Reports some improvement with home restoril.  Discussed importance of sleep hygiene and continuing abstinence from alcohol.  Prior was drinking one glass of wine nightly to help with symptoms.  Has attempted trazodone, melatonin in past without relief.  Consider sleep medicine referral.  2/3.  Chronic, stable.  Noticed LLQ mild tenderness today however reports improvement since hospitalization.  Normal Bms.  Still with some difficulty with appetite however denies weight loss.  Discussed importance of continued abstinence from alcohol.  Repeat lipase and labs pending from prior visit.  Consider repeat imaging if persistent pain vs GI referral.  4.  Stable, continue home meds  5/6.  Mammogram and DEXA scan ordered.      RTC in 3-6 months    Easton Sadler MD  [facility]    This note was generated with the assistance of ambient listening technology. Verbal consent was obtained by the patient and accompanying visitor(s) for the recording of patient appointment to facilitate this note. I attest to having reviewed and edited the generated note for accuracy, though some syntax or spelling errors may persist. Please contact the author of this note for any clarification.

## 2025-03-29 ENCOUNTER — RESULTS FOLLOW-UP (OUTPATIENT)
Dept: INTERNAL MEDICINE | Facility: CLINIC | Age: 70
End: 2025-03-29

## 2025-03-29 ENCOUNTER — RESULTS FOLLOW-UP (OUTPATIENT)
Dept: GASTROENTEROLOGY | Facility: CLINIC | Age: 70
End: 2025-03-29

## 2025-04-23 ENCOUNTER — OFFICE VISIT (OUTPATIENT)
Dept: ALLERGY | Facility: CLINIC | Age: 70
End: 2025-04-23
Payer: MEDICARE

## 2025-04-23 VITALS
WEIGHT: 114.88 LBS | HEART RATE: 78 BPM | SYSTOLIC BLOOD PRESSURE: 111 MMHG | OXYGEN SATURATION: 99 % | DIASTOLIC BLOOD PRESSURE: 75 MMHG | BODY MASS INDEX: 21.14 KG/M2 | HEIGHT: 62 IN

## 2025-04-23 DIAGNOSIS — R49.0 CHRONIC HOARSENESS: Primary | ICD-10-CM

## 2025-04-23 DIAGNOSIS — Z79.899 ON ANGIOTENSIN-CONVERTING ENZYME (ACE) INHIBITORS: ICD-10-CM

## 2025-04-23 DIAGNOSIS — J31.0 CHRONIC RHINITIS: ICD-10-CM

## 2025-04-23 PROCEDURE — 1126F AMNT PAIN NOTED NONE PRSNT: CPT | Mod: CPTII,S$GLB,, | Performed by: STUDENT IN AN ORGANIZED HEALTH CARE EDUCATION/TRAINING PROGRAM

## 2025-04-23 PROCEDURE — 99214 OFFICE O/P EST MOD 30 MIN: CPT | Mod: S$GLB,,, | Performed by: STUDENT IN AN ORGANIZED HEALTH CARE EDUCATION/TRAINING PROGRAM

## 2025-04-23 PROCEDURE — 4010F ACE/ARB THERAPY RXD/TAKEN: CPT | Mod: CPTII,S$GLB,, | Performed by: STUDENT IN AN ORGANIZED HEALTH CARE EDUCATION/TRAINING PROGRAM

## 2025-04-23 PROCEDURE — 3078F DIAST BP <80 MM HG: CPT | Mod: CPTII,S$GLB,, | Performed by: STUDENT IN AN ORGANIZED HEALTH CARE EDUCATION/TRAINING PROGRAM

## 2025-04-23 PROCEDURE — 1160F RVW MEDS BY RX/DR IN RCRD: CPT | Mod: CPTII,S$GLB,, | Performed by: STUDENT IN AN ORGANIZED HEALTH CARE EDUCATION/TRAINING PROGRAM

## 2025-04-23 PROCEDURE — 1101F PT FALLS ASSESS-DOCD LE1/YR: CPT | Mod: CPTII,S$GLB,, | Performed by: STUDENT IN AN ORGANIZED HEALTH CARE EDUCATION/TRAINING PROGRAM

## 2025-04-23 PROCEDURE — 3288F FALL RISK ASSESSMENT DOCD: CPT | Mod: CPTII,S$GLB,, | Performed by: STUDENT IN AN ORGANIZED HEALTH CARE EDUCATION/TRAINING PROGRAM

## 2025-04-23 PROCEDURE — 3008F BODY MASS INDEX DOCD: CPT | Mod: CPTII,S$GLB,, | Performed by: STUDENT IN AN ORGANIZED HEALTH CARE EDUCATION/TRAINING PROGRAM

## 2025-04-23 PROCEDURE — 1159F MED LIST DOCD IN RCRD: CPT | Mod: CPTII,S$GLB,, | Performed by: STUDENT IN AN ORGANIZED HEALTH CARE EDUCATION/TRAINING PROGRAM

## 2025-04-23 PROCEDURE — 3074F SYST BP LT 130 MM HG: CPT | Mod: CPTII,S$GLB,, | Performed by: STUDENT IN AN ORGANIZED HEALTH CARE EDUCATION/TRAINING PROGRAM

## 2025-04-23 PROCEDURE — 99999 PR PBB SHADOW E&M-EST. PATIENT-LVL IV: CPT | Mod: PBBFAC,,, | Performed by: STUDENT IN AN ORGANIZED HEALTH CARE EDUCATION/TRAINING PROGRAM

## 2025-04-23 NOTE — PROGRESS NOTES
Allergy Clinic Note  [facility]    This note was created by combination of typed  and dictation. Transcription errors are likely.  If there are any questions, please contact me.    Subjective:      Patient ID: Essence Tapia is a 69 y.o. female.    Chief Complaint: Follow-up (Review lab) and Nasal Congestion      Referring Provider:      History of Present Illness: Essence Tapia is a 69 y.o. female with chronic rhinitis is hear complaining of raspy voice.  Client evidently referred for headache but she denies significant symptoms.    Related medications and other interventions  (ACE inhibitor)    1/13/2025:  At initial visit, client reported a several decade history of hoarseness.  She says it started when she was a  (copious passive smoke exposure).  At the time it was intermittent but this has subsequently become chronic.  Additional symptoms include nasal congestion, runny nose, postnasal drip sensation, cough, throat clearing, sneezing, and itchy eyes.  The throat and nasal symptoms are perennial without variation.  The itching and sneezing occurs occasionally.  She has not been helped by Claritin, Zyrtec, Xyzal, Flonase.  She does not think she has tried Astelin or Atrovent.  She had allergy testing around age 4[birth year]20[birth year]23 which she said was positive and she was treated with immunotherapy.  She has not had any recent allergy testing.       MEDICAL HISTORY      Significant past medical history: HTN, thyroid disease, macrocytic anemia  Active problem list reviewed  ENT surgery:  tonsillectomy, nasal reconstruction    Significant family history:  No allergies or asthma  Exposures:  Grandchildren age 2 years and 5 months as 1/13/2025.  + grand dog 1-2x/wk.  No current passive smoke exposure but history of heavy dust exposure in the past.  No pets in her home.  Smoking Hx:  Client  reports that she has never smoked. She has never been exposed to tobacco smoke. She has  never used smokeless tobacco.    Meds: MAR reviewed    Asthma: No  Eczema: No   Rhinitis: Yes, See HPI  Drug allergy/intolerance:   Venom allergy:  No      Patient Active Problem List   Diagnosis    Diverticular disease    History of pancreatitis    Hypertension    Hyposomnia, insomnia or sleeplessness associated with anxiety    Hypothyroidism    Screening for colon cancer    Alcohol abuse    Acute pancreatitis    Anxiety    Aortic atherosclerosis    Chronic rhinitis with negative Immunocaps    Irritable bowel syndrome (IBS)     Medication List with Changes/Refills   Current Medications    BRIMONIDINE TARTRATE (LUMIFY OPHT)    Place 1 drop into both eyes daily as needed (redness).       Start Date: --        End Date: --    HYDROXYZINE HCL (ATARAX) 25 MG TABLET    Take 1 tablet (25 mg total) by mouth daily as needed for Anxiety.       Start Date: 3/3/2025  End Date: --    IBUPROFEN (ADVIL) 200 MG TABLET    Take 400 mg by mouth daily as needed (headache).       Start Date: --        End Date: --    IBUPROFEN/DIPHENHYDRAMINE CIT (ADVIL PM ORAL)    Take 1 tablet by mouth nightly as needed (sleep).       Start Date: --        End Date: --    LEVOTHYROXINE (SYNTHROID) 88 MCG TABLET    TAKE 1 TABLET BY MOUTH EVERY DAY       Start Date: 12/24/2024End Date: --    LISINOPRIL-HYDROCHLOROTHIAZIDE (PRINZIDE,ZESTORETIC) 10-12.5 MG PER TABLET    Take 1 tablet by mouth once daily.       Start Date: 3/3/2025  End Date: --    LORATADINE (CLARITIN) 10 MG TABLET    Take 10 mg by mouth daily as needed for Allergies.       Start Date: --        End Date: --    MORPHINE (MSIR) 15 MG TABLET    Take 1 tablet (15 mg total) by mouth every 6 (six) hours as needed for Pain.       Start Date: 3/22/2025 End Date: --    POTASSIUM CHLORIDE (KLOR-CON) 10 MEQ TBSR    Take 1 tablet (10 mEq total) by mouth once daily.       Start Date: 2/27/2025 End Date: --    PROCHLORPERAZINE (COMPAZINE) 10 MG TABLET    Take 1 tablet (10 mg total) by mouth every 6  "(six) hours as needed (nausea).       Start Date: 3/22/2025 End Date: --    PSYLLIUM (METAMUCIL SUGAR FREE) POWD    Place 1 tbsp into beverage and drink daily       Start Date: --        End Date: --    ROSUVASTATIN (CRESTOR) 5 MG TABLET    Take 1 tablet (5 mg total) by mouth once daily.       Start Date: 12/2/2024 End Date: --    SENNA (SENOKOT) 8.6 MG TABLET    Take 1 tablet by mouth 2 (two) times a day. If you start having too many bowel movements do not take. This is to prevent opioid constipation       Start Date: 3/22/2025 End Date: --    TEMAZEPAM (RESTORIL) 15 MG CAP    Take 2 capsules (30 mg total) by mouth nightly as needed (sleep disturbance).       Start Date: 3/28/2025 End Date: 4/27/2025    UNABLE TO FIND    Take 1 tablet by mouth Daily. medication name: BIO COMPLETE 3       Start Date: --        End Date: --         REVIEW OF SYSTEMS      CONST: no F/C/NS, no unintentional weight changes  NEURO:  no tremor, no weakness  EYES: no discharge, no erythema  EARS: no hearing loss, no sensation of fullness  PULM:  no SOB, no wheezing, no cough  CV: no CP, no palpitations  DERM: no rashes, no skin breaks    PHYSICAL EXAM     /75 (BP Location: Left arm, Patient Position: Sitting)   Pulse 78   Ht 5' 2" (1.575 m)   Wt 52.1 kg (114 lb 13.8 oz)   SpO2 99%   BMI 21.01 kg/m²   GEN: Awake and alert, no distress, + gravelly voice  DERM:  No flushing, no rashes  EYE:  No occular discharge, no redness  HEENT: No nasal discharge, no hoarseness  PULM: Normal work of breathing, no cough  NEURO:  No focal deficit, speech fluent and logical  PSYCH: appropriate affect, normal behavior  EXTREM: mild deformity of b/l MCP, b/l wrists and some DIP    MEDICAL DECISION MAKING     Data reviewed:       (new entries in bold-face)      Allergy Testing      Inhalant ImmunoCAP negative, 2025      Lab results   (new entries in bold face)      Total IgE than 21, 2025   Eo count 200, 2024      Imaging and other diagnostics        " Medical records review      Diagnoses:     Essence Tapia is a 69 y.o. female. with  1. Chronic hoarseness    2. Chronic rhinitis with negative immunocap    3. On angiotensin-converting enzyme (ACE) inhibitors      Assessment / Plan   Ms Tapia has longstanding hoarseness, previously intermittent, now chronic.  She has no evidence of underlying allergies by ImmunoCAP testing.  She had no response to empiric treatment for postnasal drip with azelastine nasal spray or ipratropium nasal spray.  Referral to ENT for direct visualization of her throat.    Chronic hoarseness    ENT referral to [facility]    Comorbidities  Chronic rhinitis with negative immunocaps  Thyroid disease -- can contribute to rhinitis  HTN -- avoid oral decongestants,   ACE inhibitor --     PATIENT INSTRUCTIONS AND FOLLOW-UP     There are no Patient Instructions on file for this visit.    No follow-ups on file.        Gema Lebron MD  Allergy, Asthma & Immunology      I spent a total of 31 minutes on the day of the visit. This includes face to face time and non-face to face time preparing to see the patient (eg, review of tests), obtaining and/or reviewing separately obtained history, documenting clinical information in the electronic or other health record, independently interpreting results and communicating results to the patient/family/caregiver, or care coordinator.

## 2025-04-27 ENCOUNTER — PATIENT MESSAGE (OUTPATIENT)
Dept: INTERNAL MEDICINE | Facility: CLINIC | Age: 70
End: 2025-04-27
Payer: MEDICARE

## 2025-04-27 DIAGNOSIS — F51.05 HYPOSOMNIA, INSOMNIA OR SLEEPLESSNESS ASSOCIATED WITH ANXIETY: ICD-10-CM

## 2025-04-27 DIAGNOSIS — F41.9 HYPOSOMNIA, INSOMNIA OR SLEEPLESSNESS ASSOCIATED WITH ANXIETY: ICD-10-CM

## 2025-04-27 RX ORDER — TEMAZEPAM 15 MG/1
30 CAPSULE ORAL NIGHTLY PRN
Qty: 60 CAPSULE | Refills: 0 | Status: CANCELLED | OUTPATIENT
Start: 2025-04-27 | End: 2025-05-27

## 2025-04-28 RX ORDER — TEMAZEPAM 15 MG/1
30 CAPSULE ORAL NIGHTLY PRN
Qty: 60 CAPSULE | Refills: 0 | Status: SHIPPED | OUTPATIENT
Start: 2025-04-28 | End: 2025-05-28

## 2025-04-28 NOTE — TELEPHONE ENCOUNTER
Pt's refill is pending for Ambien. \    She is still having trouble sleeping and would like a referral for a sleep specialist

## 2025-05-22 ENCOUNTER — PATIENT MESSAGE (OUTPATIENT)
Dept: INTERNAL MEDICINE | Facility: CLINIC | Age: 70
End: 2025-05-22
Payer: MEDICARE

## 2025-05-22 DIAGNOSIS — B37.9 YEAST INFECTION: Primary | ICD-10-CM

## 2025-05-22 RX ORDER — FLUCONAZOLE 150 MG/1
150 TABLET ORAL DAILY
Qty: 6 TABLET | Refills: 0 | Status: SHIPPED | OUTPATIENT
Start: 2025-05-22

## 2025-05-26 DIAGNOSIS — F51.05 HYPOSOMNIA, INSOMNIA OR SLEEPLESSNESS ASSOCIATED WITH ANXIETY: ICD-10-CM

## 2025-05-26 DIAGNOSIS — F41.9 HYPOSOMNIA, INSOMNIA OR SLEEPLESSNESS ASSOCIATED WITH ANXIETY: ICD-10-CM

## 2025-05-26 RX ORDER — TEMAZEPAM 15 MG/1
30 CAPSULE ORAL NIGHTLY PRN
Qty: 60 CAPSULE | Refills: 0 | Status: SHIPPED | OUTPATIENT
Start: 2025-05-26 | End: 2025-06-25

## 2025-05-26 RX ORDER — TEMAZEPAM 15 MG/1
30 CAPSULE ORAL NIGHTLY PRN
Qty: 60 CAPSULE | Refills: 0 | OUTPATIENT
Start: 2025-05-26 | End: 2025-06-25

## 2025-05-26 NOTE — TELEPHONE ENCOUNTER
Refill Routing Note   Medication(s) are not appropriate for processing by [facility] for the following reason(s):        Outside of protocol  Non-participating provider    [facility] action(s):  Route               Appointments  past 12m or future 3m with PCP    Date Provider   Last Visit   3/28/2025 Easton Sadler MD   Next Visit   5/26/2025 Easton Sadler MD   ED visits in past 90 days: 0        Note composed:9:20 AM 05/26/2025

## 2025-05-26 NOTE — TELEPHONE ENCOUNTER
Refill Routing Note   Medication(s) are not appropriate for processing by [facility] for the following reason(s):        Outside of protocol  Non-participating provider    [facility] action(s):  Route  Quick Discontinue        Medication Therapy Plan: Duplicate request      Appointments  past 12m or future 3m with PCP    Date Provider   Last Visit   3/28/2025 Easton Sadler MD   Next Visit   Visit date not found aEston Sadler MD   ED visits in past 90 days: 0        Note composed:9:20 AM 05/26/2025

## 2025-05-27 DIAGNOSIS — F41.9 HYPOSOMNIA, INSOMNIA OR SLEEPLESSNESS ASSOCIATED WITH ANXIETY: ICD-10-CM

## 2025-05-27 DIAGNOSIS — F51.05 HYPOSOMNIA, INSOMNIA OR SLEEPLESSNESS ASSOCIATED WITH ANXIETY: ICD-10-CM

## 2025-05-27 RX ORDER — TEMAZEPAM 15 MG/1
30 CAPSULE ORAL NIGHTLY PRN
Qty: 60 CAPSULE | Refills: 0 | OUTPATIENT
Start: 2025-05-27 | End: 2025-06-26

## 2025-05-30 ENCOUNTER — TELEPHONE (OUTPATIENT)
Dept: ORTHOPEDICS | Facility: CLINIC | Age: 70
End: 2025-05-30
Payer: MEDICARE

## 2025-05-30 DIAGNOSIS — R52 PAIN: Primary | ICD-10-CM

## 2025-06-02 RX ORDER — LISINOPRIL AND HYDROCHLOROTHIAZIDE 10; 12.5 MG/1; MG/1
1 TABLET ORAL DAILY
Qty: 90 TABLET | Refills: 1 | Status: CANCELLED | OUTPATIENT
Start: 2025-06-02

## 2025-06-23 DIAGNOSIS — F51.05 HYPOSOMNIA, INSOMNIA OR SLEEPLESSNESS ASSOCIATED WITH ANXIETY: ICD-10-CM

## 2025-06-23 DIAGNOSIS — F41.9 HYPOSOMNIA, INSOMNIA OR SLEEPLESSNESS ASSOCIATED WITH ANXIETY: ICD-10-CM

## 2025-06-23 RX ORDER — TEMAZEPAM 15 MG/1
30 CAPSULE ORAL NIGHTLY PRN
Qty: 60 CAPSULE | Refills: 0 | Status: CANCELLED | OUTPATIENT
Start: 2025-06-23 | End: 2025-07-23

## 2025-06-23 RX ORDER — TEMAZEPAM 15 MG/1
30 CAPSULE ORAL NIGHTLY PRN
Qty: 60 CAPSULE | Refills: 0 | Status: SHIPPED | OUTPATIENT
Start: 2025-06-26 | End: 2025-07-26

## 2025-06-23 NOTE — TELEPHONE ENCOUNTER
No care due was identified.  [facility] Embedded Care Due Messages. Reference number: [id number].   6/23/2025 10:43:59 AM CDT

## 2025-06-24 DIAGNOSIS — F51.05 HYPOSOMNIA, INSOMNIA OR SLEEPLESSNESS ASSOCIATED WITH ANXIETY: ICD-10-CM

## 2025-06-24 DIAGNOSIS — F41.9 ANXIETY: ICD-10-CM

## 2025-06-24 DIAGNOSIS — F41.9 HYPOSOMNIA, INSOMNIA OR SLEEPLESSNESS ASSOCIATED WITH ANXIETY: ICD-10-CM

## 2025-06-24 DIAGNOSIS — E03.9 HYPOTHYROIDISM, UNSPECIFIED TYPE: ICD-10-CM

## 2025-06-24 RX ORDER — LISINOPRIL AND HYDROCHLOROTHIAZIDE 10; 12.5 MG/1; MG/1
1 TABLET ORAL DAILY
Qty: 90 TABLET | Refills: 0 | Status: CANCELLED | OUTPATIENT
Start: 2025-06-24

## 2025-06-24 RX ORDER — TEMAZEPAM 15 MG/1
30 CAPSULE ORAL NIGHTLY PRN
Qty: 60 CAPSULE | Refills: 0 | OUTPATIENT
Start: 2025-06-26 | End: 2025-07-26

## 2025-06-24 RX ORDER — HYDROXYZINE HYDROCHLORIDE 25 MG/1
25 TABLET, FILM COATED ORAL DAILY PRN
Qty: 90 TABLET | Refills: 1 | Status: CANCELLED | OUTPATIENT
Start: 2025-06-24

## 2025-06-24 NOTE — TELEPHONE ENCOUNTER
No care due was identified.  [facility] Embedded Care Due Messages. Reference number: [id number].   6/24/2025 4:48:56 PM CDT

## 2025-06-24 NOTE — TELEPHONE ENCOUNTER
No care due was identified.  [facility] Embedded Care Due Messages. Reference number: [id number].   6/24/2025 4:48:24 PM CDT

## 2025-06-25 RX ORDER — ROSUVASTATIN CALCIUM 5 MG/1
5 TABLET, COATED ORAL DAILY
Qty: 90 TABLET | Refills: 3 | Status: SHIPPED | OUTPATIENT
Start: 2025-06-25

## 2025-06-25 RX ORDER — HYDROXYZINE HYDROCHLORIDE 25 MG/1
25 TABLET, FILM COATED ORAL DAILY PRN
Qty: 90 TABLET | Refills: 1 | Status: SHIPPED | OUTPATIENT
Start: 2025-06-25

## 2025-06-25 RX ORDER — LEVOTHYROXINE SODIUM 88 UG/1
88 TABLET ORAL
Qty: 90 TABLET | Refills: 3 | Status: SHIPPED | OUTPATIENT
Start: 2025-06-25

## 2025-06-25 RX ORDER — LISINOPRIL AND HYDROCHLOROTHIAZIDE 10; 12.5 MG/1; MG/1
1 TABLET ORAL DAILY
Qty: 90 TABLET | Refills: 0 | Status: SHIPPED | OUTPATIENT
Start: 2025-06-25

## 2025-07-07 ENCOUNTER — OFFICE VISIT (OUTPATIENT)
Dept: ORTHOPEDICS | Facility: CLINIC | Age: 70
End: 2025-07-07
Payer: MEDICARE

## 2025-07-07 ENCOUNTER — HOSPITAL ENCOUNTER (OUTPATIENT)
Facility: HOSPITAL | Age: 70
Discharge: HOME | End: 2025-07-07
Attending: ORTHOPAEDIC SURGERY
Payer: MEDICARE

## 2025-07-07 DIAGNOSIS — M19.042 ARTHRITIS OF BOTH HANDS: Primary | ICD-10-CM

## 2025-07-07 DIAGNOSIS — R52 PAIN: ICD-10-CM

## 2025-07-07 DIAGNOSIS — M19.041 ARTHRITIS OF BOTH HANDS: Primary | ICD-10-CM

## 2025-07-07 PROCEDURE — 99203 OFFICE O/P NEW LOW 30 MIN: CPT | Mod: S$GLB,,, | Performed by: ORTHOPAEDIC SURGERY

## 2025-07-07 PROCEDURE — 73130 X-RAY EXAM OF HAND: CPT | Mod: 26,50,, | Performed by: RADIOLOGY

## 2025-07-07 PROCEDURE — 3288F FALL RISK ASSESSMENT DOCD: CPT | Mod: CPTII,S$GLB,, | Performed by: ORTHOPAEDIC SURGERY

## 2025-07-07 PROCEDURE — 1125F AMNT PAIN NOTED PAIN PRSNT: CPT | Mod: CPTII,S$GLB,, | Performed by: ORTHOPAEDIC SURGERY

## 2025-07-07 PROCEDURE — 1100F PTFALLS ASSESS-DOCD GE2>/YR: CPT | Mod: CPTII,S$GLB,, | Performed by: ORTHOPAEDIC SURGERY

## 2025-07-07 PROCEDURE — 73130 X-RAY EXAM OF HAND: CPT | Mod: TC,50,PN

## 2025-07-07 PROCEDURE — 99999 PR PBB SHADOW E&M-EST. PATIENT-LVL III: CPT | Mod: PBBFAC,,, | Performed by: ORTHOPAEDIC SURGERY

## 2025-07-07 PROCEDURE — 1159F MED LIST DOCD IN RCRD: CPT | Mod: CPTII,S$GLB,, | Performed by: ORTHOPAEDIC SURGERY

## 2025-07-07 PROCEDURE — 4010F ACE/ARB THERAPY RXD/TAKEN: CPT | Mod: CPTII,S$GLB,, | Performed by: ORTHOPAEDIC SURGERY

## 2025-07-07 RX ORDER — CELECOXIB 200 MG/1
200 CAPSULE ORAL DAILY
Qty: 30 CAPSULE | Refills: 2 | Status: SHIPPED | OUTPATIENT
Start: 2025-07-07 | End: 2025-10-05

## 2025-07-07 NOTE — PROGRESS NOTES
Subjective:      Patient ID: Essence Tapia is a 69 y.o. female.    Chief Complaint: Pain of the Right Hand, Pain of the Left Hand, and Consult (/)      HPI  Essence Tapia is a  69 y.o. female presenting today for bilateral hand symptoms including intermittent pain stiffness and muscle spasms from time to time.  There was not a history of trauma.  Onset of symptoms began more than 6 months ago   She did have carpal tunnel surgery many years ago but was told that she may have had some recurrence   She does not really describe numbness just occasional muscle spasms in the hand.      Review of patient's allergies indicates:   Allergen Reactions    Codeine     Hydrocodone Hives, Nausea And Vomiting and Other (See Comments)     hallucinations    Hydromorphone Other (See Comments)     hallucinations    Latex Itching    Tramadol          Current Medications[1]    Past Medical History:   Diagnosis Date    Diverticulitis     Edema     Hypertension     Spinal stenosis     Thyroid disease        Past Surgical History:   Procedure Laterality Date    APPENDECTOMY      BREAST SURGERY  2023     SECTION, CLASSIC      COLON SURGERY      COSMETIC SURGERY      ESOPHAGOGASTRODUODENOSCOPY N/A 2025    Procedure: EGD (ESOPHAGOGASTRODUODENOSCOPY);  Surgeon: Jann Doyle MD;  [facility]: [facility];  Service: Endoscopy;  Laterality: N/A;  Ref by MD FABI Clark, copy in hand - PC  -Precall complete-BM  2.27 precall attempted; [facility] with callback number; [facility]    LIPOSUCTION  2023    NASAL RECONSTRUCTION      SIGMOIDECTOMY      TONSILLECTOMY      TOTAL REDUCTION MAMMOPLASTY Bilateral     and lift       Review of Systems:  ROS    OBJECTIVE:     PHYSICAL EXAM:       Vitals:    25 1334   PainSc:   5   PainLoc: Hand     Well developed, well nourished female in no acute distress  Alert and oriented x 3  HEENT- Normal exam  Lungs- Clear to auscultation  Heart- Regular rate and  rhythm  Abdomen- Soft nontender  Extremity exam- examination of the hands show some bony enlargement at multiple joints especially at the base of each thumb where she has bony enlargement and stiffness   The MP joints have some instability of the thumb as a result   Range of motion fingers almost full  strength slightly decreased Tinel sign mildly positive   No atrophy    RADIOGRAPHS:  AP lateral x-rays bilateral hands show severe arthritic changes of the base of each thumb also involving the MP and D IP joints less severe  Comments: I have personally reviewed the imaging and I agree with the above radiologist's report.    ASSESSMENT/PLAN:     IMPRESSION:  1. 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      2. Possible bilateral carpal tunnel syndrome    PLAN:  Although she does not have typical symptoms I have ordered nerve conduction studies both hands to check for carpal tunnel which may be causing muscle spasms   I also recommended that we start Celebrex 1 a day with food and a compounding cream for topical use as well   She really does not feel like she needs injections but that would be an option in the future as well as surgery   She does not have much stiffness but we could consider some therapy if the fingers become stiff in the future   Follow up after the nerve test is complete       - We talked at length about the anatomy and pathophysiology of   Encounter Diagnosis   Name Primary?    Arthritis of both hands Yes           Disclaimer: This note has been generated using voice-recognition software. There may be typographical errors that have been missed during proof-reading.          [1]   Current Outpatient Medications   Medication Sig Dispense Refill    brimonidine tartrate (LUMIFY OPHT) Place 1 drop into both eyes daily as needed (redness).      fluconazole (DIFLUCAN) 150 MG Tab Take 1 tablet (150 mg total) by mouth once daily. 6 tablet 0    hydrOXYzine HCL (ATARAX) 25 MG tablet Take 1  tablet (25 mg total) by mouth daily as needed for Anxiety. 90 tablet 1    ibuprofen (ADVIL) 200 MG tablet Take 400 mg by mouth daily as needed (headache).      levothyroxine (SYNTHROID) 88 MCG tablet TAKE 1 TABLET BY MOUTH EVERY DAY 90 tablet 3    lisinopriL-hydrochlorothiazide (PRINZIDE,ZESTORETIC) 10-12.5 mg per tablet Take 1 tablet by mouth once daily. 90 tablet 0    loratadine (CLARITIN) 10 mg tablet Take 10 mg by mouth daily as needed for Allergies.      potassium chloride (KLOR-CON) 10 MEQ TbSR Take 1 tablet (10 mEq total) by mouth once daily. 90 tablet 0    prochlorperazine (COMPAZINE) 10 MG tablet Take 1 tablet (10 mg total) by mouth every 6 (six) hours as needed (nausea). 16 tablet 0    psyllium (METAMUCIL SUGAR FREE) Powd Place 1 tbsp into beverage and drink daily      rosuvastatin (CRESTOR) 5 MG tablet Take 1 tablet (5 mg total) by mouth once daily. 90 tablet 3    temazepam (RESTORIL) 15 mg Cap Take 2 capsules (30 mg total) by mouth nightly as needed (sleep disturbance). 60 capsule 0    UNABLE TO FIND Take 1 tablet by mouth Daily. medication name: BIO COMPLETE 3      celecoxib (CELEBREX) 200 MG capsule Take 1 capsule (200 mg total) by mouth once daily. 30 capsule 2    ibuprofen/diphenhydramine cit (ADVIL PM ORAL) Take 1 tablet by mouth nightly as needed (sleep).      senna (SENOKOT) 8.6 mg tablet Take 1 tablet by mouth 2 (two) times a day. If you start having too many bowel movements do not take. This is to prevent opioid constipation 20 tablet 0     No current facility-administered medications for this visit.

## 2025-07-18 DIAGNOSIS — F51.05 HYPOSOMNIA, INSOMNIA OR SLEEPLESSNESS ASSOCIATED WITH ANXIETY: ICD-10-CM

## 2025-07-18 DIAGNOSIS — F41.9 HYPOSOMNIA, INSOMNIA OR SLEEPLESSNESS ASSOCIATED WITH ANXIETY: ICD-10-CM

## 2025-07-18 RX ORDER — TEMAZEPAM 15 MG/1
30 CAPSULE ORAL NIGHTLY PRN
Qty: 60 CAPSULE | Refills: 0 | Status: SHIPPED | OUTPATIENT
Start: 2025-07-22 | End: 2025-08-21

## 2025-07-18 NOTE — TELEPHONE ENCOUNTER
Care Due:                  Date            Visit Type   Department     Provider  --------------------------------------------------------------------------------                                EP -                              PRIMARY      MET INTERNAL  Last Visit: 03-      CARE ([facility])   MEDICINE       Easton Sadler  Next Visit: None Scheduled  None         None Found                                                            Last  Test          Frequency    Reason                     Performed    Due Date  --------------------------------------------------------------------------------    TSH.........  12 months..  levothyroxine............  10-   10-    [facility] Embedded Care Due Messages. Reference number: [id number].   7/18/2025 1:44:22 PM CDT

## 2025-07-28 ENCOUNTER — OFFICE VISIT (OUTPATIENT)
Dept: ORTHOPEDICS | Facility: CLINIC | Age: 70
End: 2025-07-28
Payer: MEDICARE

## 2025-07-28 DIAGNOSIS — G56.03 BILATERAL CARPAL TUNNEL SYNDROME: Primary | ICD-10-CM

## 2025-07-28 PROCEDURE — 4010F ACE/ARB THERAPY RXD/TAKEN: CPT | Mod: CPTII,93,, | Performed by: ORTHOPAEDIC SURGERY

## 2025-07-28 PROCEDURE — 98012 SYNCH AUDIO-ONLY EST SF 10: CPT | Mod: 93,,, | Performed by: ORTHOPAEDIC SURGERY

## 2025-07-28 NOTE — PROGRESS NOTES
Audio Only Telehealth Visit     The patient location is:  At home  The chief complaint leading to consultation is:  Bilateral hand symptoms including numbness and weakness  Visit type: Virtual visit with audio only (telephone)  Total time spent in medical discussion with patient:  12 minutes  Total time spent on date of the encounter:  12 minutes       The reason for the audio only service rather than synchronous audio and video virtual visit was related to technical difficulties or patient preference/necessity.       Each patient to whom I provide medical services by telemedicine is:  (1) informed of the relationship between the physician and patient and the respective role of any other health care provider with respect to management of the patient; and (2) notified that they may decline to receive medical services by telemedicine and may withdraw from such care at any time. Patient verbally consented to receive this service via voice-only telephone call.       HPI:  70-year-old female with bilateral hand pain and weakness     Assessment and plan:  Recent nerve conduction study confirms bilateral carpal tunnel syndrome and bilateral cubital tunnel syndrome at the elbow   I went over these findings with the patient   The left hand symptoms are worse in the right so she may want to consider surgery for the left hand 1st   This would be a combined procedure for ulnar nerve transposition left elbow and left carpal tunnel release as an outpatient surgery                        This service was not originating from a related E/M service provided within the previous 7 days nor will  to an E/M service or procedure within the next 24 hours or my soonest available appointment.  Prevailing standard of care was able to be met in this audio-only visit.

## 2025-08-01 ENCOUNTER — PATIENT MESSAGE (OUTPATIENT)
Dept: ORTHOPEDICS | Facility: CLINIC | Age: 70
End: 2025-08-01
Payer: MEDICARE

## 2025-08-04 NOTE — TELEPHONE ENCOUNTER
Called patient and discussed recovery from surgery.  Patient states she is expecting her 3rd grandchild soon but there are some health issues with the baby that we will need to be dealt with.  Patient will be taking care of her other 2 grand kids (3 y/o and 2 y/o) while her son/daughter-in-law take care of the baby.  Informed patient she will likely not be able to lift >10 lbs with the operative arm for 2-3 weeks.  Patient states she will discuss with her son/daughter-in-law and get back to us when she is ready to set up surgery.  I am in agreement with this plan.    Deloris Jacobo PA-C  Orthopedic Surgery  [facility]

## 2025-08-18 DIAGNOSIS — F51.05 HYPOSOMNIA, INSOMNIA OR SLEEPLESSNESS ASSOCIATED WITH ANXIETY: ICD-10-CM

## 2025-08-18 DIAGNOSIS — F41.9 HYPOSOMNIA, INSOMNIA OR SLEEPLESSNESS ASSOCIATED WITH ANXIETY: ICD-10-CM

## 2025-08-18 RX ORDER — TEMAZEPAM 15 MG/1
30 CAPSULE ORAL NIGHTLY PRN
Qty: 60 CAPSULE | Refills: 0 | Status: CANCELLED | OUTPATIENT
Start: 2025-08-18 | End: 2025-09-17

## 2025-08-19 ENCOUNTER — TELEPHONE (OUTPATIENT)
Dept: INTERNAL MEDICINE | Facility: CLINIC | Age: 70
End: 2025-08-19
Payer: MEDICARE

## 2025-08-19 DIAGNOSIS — F41.9 HYPOSOMNIA, INSOMNIA OR SLEEPLESSNESS ASSOCIATED WITH ANXIETY: ICD-10-CM

## 2025-08-19 DIAGNOSIS — F51.05 HYPOSOMNIA, INSOMNIA OR SLEEPLESSNESS ASSOCIATED WITH ANXIETY: ICD-10-CM

## 2025-08-19 DIAGNOSIS — F41.9 ANXIETY: ICD-10-CM

## 2025-08-19 RX ORDER — TEMAZEPAM 15 MG/1
30 CAPSULE ORAL NIGHTLY PRN
Qty: 60 CAPSULE | Refills: 0 | Status: CANCELLED | OUTPATIENT
Start: 2025-08-19 | End: 2025-09-18

## 2025-08-19 RX ORDER — TEMAZEPAM 15 MG/1
30 CAPSULE ORAL NIGHTLY PRN
Qty: 60 CAPSULE | Refills: 0 | Status: CANCELLED | OUTPATIENT
Start: 2025-08-18 | End: 2025-09-17

## 2025-08-20 RX ORDER — HYDROXYZINE HYDROCHLORIDE 25 MG/1
25 TABLET, FILM COATED ORAL DAILY PRN
Qty: 90 TABLET | Refills: 1 | Status: SHIPPED | OUTPATIENT
Start: 2025-08-20

## 2025-08-20 RX ORDER — TEMAZEPAM 15 MG/1
30 CAPSULE ORAL NIGHTLY PRN
Qty: 60 CAPSULE | Refills: 0 | Status: SHIPPED | OUTPATIENT
Start: 2025-08-20 | End: 2025-09-19

## 2025-08-20 RX ORDER — TEMAZEPAM 15 MG/1
30 CAPSULE ORAL NIGHTLY PRN
Qty: 60 CAPSULE | Refills: 0 | OUTPATIENT
Start: 2025-08-20 | End: 2025-09-19

## 2025-08-21 ENCOUNTER — PATIENT MESSAGE (OUTPATIENT)
Dept: INTERNAL MEDICINE | Facility: CLINIC | Age: 70
End: 2025-08-21
Payer: MEDICARE

## 2025-08-28 RX ORDER — POTASSIUM CHLORIDE 750 MG/1
10 TABLET, EXTENDED RELEASE ORAL DAILY
Qty: 90 TABLET | Refills: 1 | Status: SHIPPED | OUTPATIENT
Start: 2025-08-28